# Patient Record
Sex: FEMALE | Race: WHITE | NOT HISPANIC OR LATINO | Employment: UNEMPLOYED | URBAN - METROPOLITAN AREA
[De-identification: names, ages, dates, MRNs, and addresses within clinical notes are randomized per-mention and may not be internally consistent; named-entity substitution may affect disease eponyms.]

---

## 2017-06-06 PROCEDURE — G0145 SCR C/V CYTO,THINLAYER,RESCR: HCPCS | Performed by: OBSTETRICS & GYNECOLOGY

## 2017-06-07 ENCOUNTER — LAB REQUISITION (OUTPATIENT)
Dept: LAB | Facility: HOSPITAL | Age: 42
End: 2017-06-07
Payer: COMMERCIAL

## 2017-06-07 DIAGNOSIS — Z01.419 ENCOUNTER FOR GYNECOLOGICAL EXAMINATION WITHOUT ABNORMAL FINDING: ICD-10-CM

## 2017-06-07 DIAGNOSIS — Z11.51 ENCOUNTER FOR SCREENING FOR HUMAN PAPILLOMAVIRUS (HPV): ICD-10-CM

## 2017-06-13 LAB
LAB AP GYN PRIMARY INTERPRETATION: NORMAL
LAB AP LMP: NORMAL
Lab: NORMAL

## 2017-11-17 ENCOUNTER — HOSPITAL ENCOUNTER (OUTPATIENT)
Dept: RADIOLOGY | Facility: HOSPITAL | Age: 42
Discharge: HOME | End: 2017-11-17
Payer: COMMERCIAL

## 2017-11-17 ENCOUNTER — TRANSCRIBE ORDERS (OUTPATIENT)
Dept: ADMINISTRATIVE | Facility: HOSPITAL | Age: 42
End: 2017-11-17

## 2017-11-17 DIAGNOSIS — J01.00 ACUTE MAXILLARY SINUSITIS, RECURRENCE NOT SPECIFIED: ICD-10-CM

## 2017-11-17 DIAGNOSIS — J45.40 MODERATE PERSISTENT ASTHMA WITHOUT COMPLICATION: ICD-10-CM

## 2017-11-17 DIAGNOSIS — J20.9 ACUTE BRONCHITIS, UNSPECIFIED ORGANISM: ICD-10-CM

## 2017-11-17 DIAGNOSIS — J20.9 ACUTE BRONCHITIS, UNSPECIFIED ORGANISM: Primary | ICD-10-CM

## 2017-11-17 PROCEDURE — 71020 HB CHEST X-RAY 2VW FRONTAL&LATL: CPT

## 2017-11-17 PROCEDURE — 70220 X-RAY EXAM OF SINUSES: CPT

## 2018-06-08 PROCEDURE — 87624 HPV HI-RISK TYP POOLED RSLT: CPT | Performed by: OBSTETRICS & GYNECOLOGY

## 2018-06-08 PROCEDURE — G0143 SCR C/V CYTO,THINLAYER,RESCR: HCPCS | Performed by: OBSTETRICS & GYNECOLOGY

## 2018-06-11 ENCOUNTER — LAB REQUISITION (OUTPATIENT)
Dept: LAB | Facility: HOSPITAL | Age: 43
End: 2018-06-11
Payer: COMMERCIAL

## 2018-06-11 DIAGNOSIS — Z11.51 ENCOUNTER FOR SCREENING FOR HUMAN PAPILLOMAVIRUS (HPV): ICD-10-CM

## 2018-06-11 DIAGNOSIS — Z01.419 ENCOUNTER FOR GYNECOLOGICAL EXAMINATION WITHOUT ABNORMAL FINDING: ICD-10-CM

## 2018-06-13 LAB
HPV RRNA GENITAL QL NAA+PROBE: NORMAL
LAB AP GYN PRIMARY INTERPRETATION: NORMAL
LAB AP LMP: NORMAL
Lab: NORMAL

## 2018-07-25 ENCOUNTER — OFFICE VISIT (OUTPATIENT)
Dept: URGENT CARE | Facility: MEDICAL CENTER | Age: 43
End: 2018-07-25
Payer: COMMERCIAL

## 2018-07-25 VITALS
OXYGEN SATURATION: 100 % | HEART RATE: 82 BPM | HEIGHT: 62 IN | TEMPERATURE: 98.2 F | SYSTOLIC BLOOD PRESSURE: 148 MMHG | BODY MASS INDEX: 35.37 KG/M2 | RESPIRATION RATE: 14 BRPM | WEIGHT: 192.2 LBS | DIASTOLIC BLOOD PRESSURE: 80 MMHG

## 2018-07-25 DIAGNOSIS — L23.9 ALLERGIC CONTACT DERMATITIS, UNSPECIFIED TRIGGER: Primary | ICD-10-CM

## 2018-07-25 PROCEDURE — G0382 LEV 3 HOSP TYPE B ED VISIT: HCPCS | Performed by: FAMILY MEDICINE

## 2018-07-25 PROCEDURE — S9083 URGENT CARE CENTER GLOBAL: HCPCS | Performed by: FAMILY MEDICINE

## 2018-07-25 RX ORDER — THYROID,PORK 90 MG
130 TABLET ORAL DAILY
Refills: 0 | COMMUNITY
Start: 2018-04-17 | End: 2020-07-22 | Stop reason: ALTCHOICE

## 2018-07-25 RX ORDER — LORATADINE 10 MG/1
10 TABLET ORAL DAILY
COMMUNITY

## 2018-07-25 RX ORDER — FLUTICASONE PROPIONATE AND SALMETEROL XINAFOATE 115; 21 UG/1; UG/1
2 AEROSOL, METERED RESPIRATORY (INHALATION) 2 TIMES DAILY
Refills: 2 | COMMUNITY
Start: 2018-04-18

## 2018-07-25 RX ORDER — ALBUTEROL SULFATE 2.5 MG/3ML
2.5 SOLUTION RESPIRATORY (INHALATION) EVERY 6 HOURS PRN
COMMUNITY

## 2018-07-25 RX ORDER — METHYLPREDNISOLONE SODIUM SUCCINATE 40 MG/ML
80 INJECTION, POWDER, LYOPHILIZED, FOR SOLUTION INTRAMUSCULAR; INTRAVENOUS ONCE
Status: COMPLETED | OUTPATIENT
Start: 2018-07-25 | End: 2018-07-25

## 2018-07-25 RX ORDER — NORGESTIMATE AND ETHINYL ESTRADIOL 7DAYSX3 28
1 KIT ORAL DAILY
Refills: 4 | COMMUNITY
Start: 2018-05-11 | End: 2019-06-20 | Stop reason: ALTCHOICE

## 2018-07-25 RX ORDER — PREDNISONE 10 MG/1
TABLET ORAL
Qty: 18 TABLET | Refills: 0 | Status: SHIPPED | OUTPATIENT
Start: 2018-07-26 | End: 2020-07-22 | Stop reason: ALTCHOICE

## 2018-07-25 RX ADMIN — METHYLPREDNISOLONE SODIUM SUCCINATE 80 MG: 40 INJECTION, POWDER, LYOPHILIZED, FOR SOLUTION INTRAMUSCULAR; INTRAVENOUS at 11:50

## 2018-07-25 NOTE — PATIENT INSTRUCTIONS
Solumedrol given in office  Start prednisone taper tomorrow- 30 mg x 3 days, 20 mg x 3 days, 10 mg x 3 days  Continue benadryl and topical creams as needed for itching  Wash all towels and pillow cases  Follow up with your PCP for persistent symptoms  Go to the ER for any distress  Contact Dermatitis   AMBULATORY CARE:   Contact dermatitis  is a rash  It develops when you touch something that irritates your skin or causes an allergic reaction  Common signs and symptoms include the following:   · Red, swollen, painful rash           · Skin that itches, stings, or burns    · Dry, scaly, or crusty skin patches    · Bumps or blisters    · Fluid draining from blisters  Call 911 for any of the following:   · You have sudden trouble breathing  · Your throat swells and you have trouble eating  · Your face is swollen  Contact your healthcare provider if:   · You have a fever  · Your blisters are draining pus  · Your rash spreads or does not get better, even after treatment  · You have questions or concerns about your condition or care  Treatment for contact dermatitis  involves removing any irritants or allergens that cause your rash  You may also need medicines to decrease itching and swelling  They will be given as a topical medicine to apply to your rash or as a pill  Manage contact dermatitis:   · Take short baths or showers in cool water  Use mild soap or soap-free cleansers  Add oatmeal, baking soda, or cornstarch to the bath water to help decrease skin irritation  · Avoid skin irritants , such as makeup, hair products, soaps, and cleansers  Use products that do not contain perfume or dye  · Apply a cool compress to your rash  This will help soothe your skin  · Keep your skin moist   Rub unscented cream or lotion on your skin to prevent dryness and itching  Do this right after a bath or shower when your skin is still damp    Follow up with your healthcare provider as directed: Write down your questions so you remember to ask them during your visits  © 2017 [address] [employer] Information is for End User's use only and may not be sold, redistributed or otherwise used for commercial purposes  All illustrations and images included in CareNotes® are the copyrighted property of A [employer] M [employer]  or [employer]  The above information is an  only  It is not intended as medical advice for individual conditions or treatments  Talk to your doctor, nurse or pharmacist before following any medical regimen to see if it is safe and effective for you

## 2018-07-25 NOTE — PROGRESS NOTES
[facility]        NAME: Madonna Morrell is a 38 y o  female  : 1975    MRN: [id number]  DATE: 2018  TIME: 12:06 PM    Assessment and Plan   Allergic contact dermatitis, unspecified trigger [L23 9]  1  Allergic contact dermatitis, unspecified trigger  methylPREDNISolone sodium succinate (Solu-MEDROL) injection 80 mg    predniSONE 10 mg tablet         Patient Instructions     Solumedrol given in office  Start prednisone taper tomorrow- 30 mg x 3 days, 20 mg x 3 days, 10 mg x 3 days  Continue benadryl and topical creams as needed for itching  Wash all towels and pillow cases  Follow up with PCP in 3-5 days  Proceed to  ER if symptoms worsen  Chief Complaint     Chief Complaint   Patient presents with    Rash     face and neck x 2 day- the rash is hot and itchy         History of Present Illness       This is a 38year old female presenting for rash on her face x 4 days  She states that the rash started on the right side of her neck and has spread across her face  The rash is erythematous, raised in some areas, and very pruritic  No pain, fevers, shortness of breath, throat swelling, difficulty swallowing  She has a lot of environmental allergies  No new lotions, soaps, detergents, or contact with poison plants that she can think of  Review of Systems   Review of Systems   Constitutional: Negative for chills, fatigue and fever  HENT: Negative for sore throat and trouble swallowing  Respiratory: Negative for shortness of breath and wheezing  Gastrointestinal: Negative for diarrhea, nausea and vomiting  Skin: Positive for rash  Neurological: Negative for headaches           Current Medications       Current Outpatient Prescriptions:     albuterol (2 5 mg/3 mL) 0 083 % nebulizer solution, Take 2 5 mg by nebulization every 6 (six) hours as needed for wheezing or shortness of breath, Disp: , Rfl:     loratadine (CLARITIN) 10 mg tablet, Take 10 mg by mouth daily, Disp: , Rfl:     ADVAIR -21 MCG/ACT inhaler, Inhale 2 puffs 2 (two) times a day Rinse mouth after use, Disp: , Rfl: 2    ARMOUR THYROID 90 MG tablet, Take 130 mg by mouth daily, Disp: , Rfl: 0    [START ON 2018] predniSONE 10 mg tablet, 30 mg x 3 days, 20 mg x 3 days, 10 mg x 3 days, Disp: 18 tablet, Rfl: 0    TRI FEMYNOR 0 18/0 215/0 25 MG-35 MCG per tablet, Take 1 tablet by mouth daily, Disp: , Rfl: 4  No current facility-administered medications for this visit  Current Allergies     Allergies as of 2018 - Reviewed 2018   Allergen Reaction Noted    Asa [aspirin]  2018    Avelox [moxifloxacin]  2018            The following portions of the patient's history were reviewed and updated as appropriate: allergies, current medications, past family history, past medical history, past social history, past surgical history and problem list      Past Medical History:   Diagnosis Date    Disease of thyroid gland     Endometriosis        Past Surgical History:   Procedure Laterality Date     SECTION      WISDOM TOOTH EXTRACTION         Family History   Problem Relation Age of Onset    Heart disease Mother     Diabetes Mother     Hypertension Mother     Heart disease Father          Medications have been verified  Objective   /80   Pulse 82   Temp 98 2 °F (36 8 °C) (Temporal)   Resp 14   Ht 5' 2" (1 575 m)   Wt 87 2 kg (192 lb 3 2 oz)   LMP 2018 (Approximate)   SpO2 100%   BMI 35 15 kg/m²        Physical Exam     Physical Exam   Constitutional: She appears well-developed and well-nourished  No distress  HENT:   Head: Normocephalic and atraumatic  Nose: Nose normal    Eyes: Conjunctivae and EOM are normal  Pupils are equal, round, and reactive to light  Cardiovascular: Normal rate, regular rhythm and normal heart sounds  Pulmonary/Chest: Effort normal and breath sounds normal  No respiratory distress  She has no wheezes  She has no rales  Neurological: She is alert  Skin: Skin is warm and dry  Rash noted  She is not diaphoretic  There is an erythematous, papular rash on the face  There are some areas of small vesicles and some areas of just macular redness  No involvement of the eyes or oral mucosa  + excoriation  Nursing note and vitals reviewed

## 2018-09-29 ENCOUNTER — TRANSCRIBE ORDERS (OUTPATIENT)
Dept: LAB | Facility: CLINIC | Age: 43
End: 2018-09-29

## 2018-09-29 ENCOUNTER — APPOINTMENT (OUTPATIENT)
Dept: LAB | Facility: CLINIC | Age: 43
End: 2018-09-29
Payer: COMMERCIAL

## 2018-09-29 DIAGNOSIS — E03.4 IDIOPATHIC ATROPHIC HYPOTHYROIDISM: Primary | ICD-10-CM

## 2018-09-29 DIAGNOSIS — E03.4 IDIOPATHIC ATROPHIC HYPOTHYROIDISM: ICD-10-CM

## 2018-09-29 LAB
T4 FREE SERPL-MCNC: 0.73 NG/DL (ref 0.76–1.46)
TSH SERPL DL<=0.05 MIU/L-ACNC: 7.23 UIU/ML (ref 0.36–3.74)

## 2018-09-29 PROCEDURE — 84443 ASSAY THYROID STIM HORMONE: CPT

## 2018-09-29 PROCEDURE — 36415 COLL VENOUS BLD VENIPUNCTURE: CPT

## 2018-09-29 PROCEDURE — 84439 ASSAY OF FREE THYROXINE: CPT

## 2019-06-20 ENCOUNTER — ANNUAL EXAM (OUTPATIENT)
Dept: OBGYN CLINIC | Facility: CLINIC | Age: 44
End: 2019-06-20
Payer: COMMERCIAL

## 2019-06-20 VITALS
SYSTOLIC BLOOD PRESSURE: 120 MMHG | WEIGHT: 192 LBS | DIASTOLIC BLOOD PRESSURE: 70 MMHG | HEIGHT: 62 IN | BODY MASS INDEX: 35.33 KG/M2

## 2019-06-20 DIAGNOSIS — Z01.419 ENCOUNTER FOR WELL WOMAN EXAM: Primary | ICD-10-CM

## 2019-06-20 DIAGNOSIS — Z30.41 ENCOUNTER FOR SURVEILLANCE OF CONTRACEPTIVE PILLS: ICD-10-CM

## 2019-06-20 DIAGNOSIS — Z12.39 SCREENING FOR BREAST CANCER: ICD-10-CM

## 2019-06-20 DIAGNOSIS — Z30.41 SURVEILLANCE FOR BIRTH CONTROL, ORAL CONTRACEPTIVES: ICD-10-CM

## 2019-06-20 PROCEDURE — S0612 ANNUAL GYNECOLOGICAL EXAMINA: HCPCS | Performed by: PHYSICIAN ASSISTANT

## 2019-06-20 RX ORDER — ALBUTEROL SULFATE 90 UG/1
AEROSOL, METERED RESPIRATORY (INHALATION)
COMMUNITY
End: 2021-07-16 | Stop reason: HOSPADM

## 2019-06-20 RX ORDER — HYDROXYCHLOROQUINE SULFATE 200 MG/1
100 TABLET, FILM COATED ORAL 2 TIMES DAILY WITH MEALS
Status: ON HOLD | COMMUNITY
End: 2021-07-16

## 2019-06-20 RX ORDER — NORGESTIMATE AND ETHINYL ESTRADIOL 7DAYSX3 28
1 KIT ORAL DAILY
Qty: 90 TABLET | Refills: 3 | Status: SHIPPED | OUTPATIENT
Start: 2019-06-20 | End: 2020-05-12 | Stop reason: SDUPTHER

## 2019-06-20 RX ORDER — LEVOTHYROXINE SODIUM 112 UG/1
112 TABLET ORAL DAILY
COMMUNITY
End: 2020-07-22 | Stop reason: ALTCHOICE

## 2019-06-20 RX ORDER — NORGESTIMATE AND ETHINYL ESTRADIOL 7DAYSX3 28
1 KIT ORAL DAILY
Qty: 30 TABLET | Refills: 11 | Status: CANCELLED | OUTPATIENT
Start: 2019-06-20

## 2020-05-12 DIAGNOSIS — Z30.41 SURVEILLANCE FOR BIRTH CONTROL, ORAL CONTRACEPTIVES: ICD-10-CM

## 2020-05-13 RX ORDER — NORGESTIMATE AND ETHINYL ESTRADIOL 7DAYSX3 28
1 KIT ORAL DAILY
Qty: 90 TABLET | Refills: 0 | Status: SHIPPED | OUTPATIENT
Start: 2020-05-13 | End: 2020-07-22 | Stop reason: SDUPTHER

## 2020-06-22 ENCOUNTER — HOSPITAL ENCOUNTER (OUTPATIENT)
Dept: MAMMOGRAPHY | Facility: HOSPITAL | Age: 45
Discharge: HOME | End: 2020-06-22
Payer: COMMERCIAL

## 2020-06-22 VITALS — HEIGHT: 62 IN | WEIGHT: 192 LBS | BODY MASS INDEX: 35.33 KG/M2

## 2020-06-22 DIAGNOSIS — Z12.39 SCREENING FOR BREAST CANCER: ICD-10-CM

## 2020-06-22 PROCEDURE — 77063 BREAST TOMOSYNTHESIS BI: CPT

## 2020-06-22 PROCEDURE — 77067 SCR MAMMO BI INCL CAD: CPT

## 2020-06-24 ENCOUNTER — ANNUAL EXAM (OUTPATIENT)
Dept: OBGYN CLINIC | Facility: CLINIC | Age: 45
End: 2020-06-24
Payer: COMMERCIAL

## 2020-06-24 VITALS
SYSTOLIC BLOOD PRESSURE: 124 MMHG | BODY MASS INDEX: 35.33 KG/M2 | HEIGHT: 62 IN | DIASTOLIC BLOOD PRESSURE: 80 MMHG | WEIGHT: 192 LBS

## 2020-06-24 DIAGNOSIS — Z01.419 ENCOUNTER FOR WELL WOMAN EXAM: Primary | ICD-10-CM

## 2020-06-24 DIAGNOSIS — Z30.41 SURVEILLANCE FOR BIRTH CONTROL, ORAL CONTRACEPTIVES: ICD-10-CM

## 2020-06-24 DIAGNOSIS — Z12.39 ENCOUNTER FOR SCREENING BREAST EXAMINATION: ICD-10-CM

## 2020-06-24 DIAGNOSIS — Z12.31 ENCOUNTER FOR SCREENING MAMMOGRAM FOR BREAST CANCER: ICD-10-CM

## 2020-06-24 PROCEDURE — S0612 ANNUAL GYNECOLOGICAL EXAMINA: HCPCS | Performed by: PHYSICIAN ASSISTANT

## 2020-06-24 RX ORDER — NORGESTIMATE AND ETHINYL ESTRADIOL 7DAYSX3 28
1 KIT ORAL DAILY
Qty: 90 TABLET | Refills: 4 | Status: SHIPPED | OUTPATIENT
Start: 2020-06-24 | End: 2021-07-16 | Stop reason: HOSPADM

## 2020-07-14 ENCOUNTER — TELEPHONE (OUTPATIENT)
Dept: ENDOCRINOLOGY | Facility: CLINIC | Age: 45
End: 2020-07-14

## 2020-07-22 ENCOUNTER — OFFICE VISIT (OUTPATIENT)
Dept: ENDOCRINOLOGY | Facility: CLINIC | Age: 45
End: 2020-07-22
Payer: COMMERCIAL

## 2020-07-22 VITALS
WEIGHT: 191 LBS | SYSTOLIC BLOOD PRESSURE: 124 MMHG | BODY MASS INDEX: 35.5 KG/M2 | DIASTOLIC BLOOD PRESSURE: 78 MMHG | HEART RATE: 62 BPM | TEMPERATURE: 97.5 F

## 2020-07-22 DIAGNOSIS — J45.20 MILD INTERMITTENT ASTHMA WITHOUT COMPLICATION: ICD-10-CM

## 2020-07-22 DIAGNOSIS — E06.3 HYPOTHYROIDISM DUE TO HASHIMOTO'S THYROIDITIS: Primary | ICD-10-CM

## 2020-07-22 DIAGNOSIS — E03.8 HYPOTHYROIDISM DUE TO HASHIMOTO'S THYROIDITIS: Primary | ICD-10-CM

## 2020-07-22 DIAGNOSIS — E66.9 CLASS 2 OBESITY WITHOUT SERIOUS COMORBIDITY WITH BODY MASS INDEX (BMI) OF 35.0 TO 35.9 IN ADULT, UNSPECIFIED OBESITY TYPE: ICD-10-CM

## 2020-07-22 DIAGNOSIS — E55.9 VITAMIN D DEFICIENCY: ICD-10-CM

## 2020-07-22 DIAGNOSIS — E66.9 OBESITY: ICD-10-CM

## 2020-07-22 PROCEDURE — 99244 OFF/OP CNSLTJ NEW/EST MOD 40: CPT | Performed by: INTERNAL MEDICINE

## 2020-07-22 RX ORDER — LEVOTHYROXINE SODIUM 112 MCG
TABLET ORAL
Qty: 100 TABLET | Refills: 0 | Status: SHIPPED | OUTPATIENT
Start: 2020-07-22 | End: 2020-10-19 | Stop reason: SDUPTHER

## 2020-07-22 NOTE — PROGRESS NOTES
New Patient Progress Note      CC: Hypothyroidism    History of Present Illness:   53 yr female with hypothyroidism, mild childhood Asthma, Sjogrens syndrome, obesity and endometriosis  She was diagnosed with Hashimoto's hypothyroidism in  with positive TPO antibodies after her second child and was initially tried on levothyroxine and [place] synthroid without adequate control but over last 1 year, has been stable on brand Synthroid 112mcg daily  She takes every morning on empty stomach and waits 1 hr before other medications  Presently she denies any cold intolerance, fatigue or constipation  No new hair loss  She follows with rheumatology for Sjogrens and is on plaquenil  She is on a OCP with withdrawal bleeding  No History of external radiation to head/neck/chest   No recent Iodine loading in form of medication, erbs or kelp supplements or radiological diagnostic studies  No difficulty with swallowing or breathing or change in voice  Family Hx of thyroid cancers:No  Reported Hx of type 2 diabetes and heart disease with mother and Father  There is no problem list on file for this patient  Past Medical History:   Diagnosis Date    Disease of thyroid gland     Endometriosis     Sjogren's syndrome ([facility] [address] )       Past Surgical History:   Procedure Laterality Date     SECTION      WISDOM TOOTH EXTRACTION        Family History   Problem Relation Age of Onset    Heart disease Mother     Diabetes Mother     Hypertension Mother     Heart disease Father      Social History     Tobacco Use    Smoking status: Never Smoker    Smokeless tobacco: Never Used   Substance Use Topics    Alcohol use: Not Currently     Comment: social     Allergies   Allergen Reactions    Asa [Aspirin]     Avelox [Moxifloxacin]        Review of Systems   Constitutional: Positive for fatigue  HENT: Negative  Eyes: Negative  Respiratory: Negative  Cardiovascular: Negative      Gastrointestinal: Negative  Endocrine: Negative  Musculoskeletal: Negative  Skin: Negative  Allergic/Immunologic: Negative  Neurological: Negative  Hematological: Negative  Psychiatric/Behavioral: Negative  All other systems reviewed and are negative  Physical Exam:  Body mass index is 35 5 kg/m²  /78   Pulse 62   Temp 97 5 °F (36 4 °C)   Wt 86 6 kg (191 lb)   BMI 35 50 kg/m²      Physical Exam   Constitutional: She is oriented to person, place, and time  She appears well-developed  HENT:   Head: Normocephalic  Mouth/Throat: Oropharynx is clear and moist    Eyes: Pupils are equal, round, and reactive to light  Neck: Normal range of motion  No thyromegaly present  Cardiovascular: Normal rate and normal heart sounds  Pulmonary/Chest: Effort normal and breath sounds normal    Abdominal: Soft  Bowel sounds are normal    Musculoskeletal: She exhibits no edema or deformity  Neurological: She is alert and oriented to person, place, and time  Skin: Capillary refill takes less than 2 seconds  No rash noted  No pallor  Psychiatric: She has a normal mood and affect  Vitals reviewed  Labs: Impression:  1  Hypothyroidism due to Hashimoto's thyroiditis    2  Obesity    3  Mild intermittent asthma without complication    4  Vitamin D deficiency    5  Class 2 obesity without serious comorbidity with body mass index (BMI) of 35 0 to 35 9 in adult, unspecified obesity type         Plan:    Diagnoses and all orders for this visit:    Hypothyroidism due to Hashimoto's thyroiditis  She seems clinically euthyroid but biochemically slightly under treated with recent TSH 2 85uIU/mL with fT4 1 18ng/dL  Today we discussed her thyroid physiology, her diagnosis and plan of care  Advised to increase to Synthroid 112mcg 1 tab daily Monday through Saturday and 1 5 tab on Sunday  Follow up in 3 months  -     T4, free; Future  -     TSH, 3rd generation;  Future  -     Comprehensive metabolic panel; Future    Sjogren's Syndrome  She is on plaquenil and follows with a rheumatologist  Recent CRP>11  Vitamin D deficiency  -     Vitamin D 25 hydroxy; Future    Class 2 obesity without serious comorbidity with body mass index (BMI) of 35 0 to 35 9 in adult, unspecified obesity type  -     Hemoglobin A1C; Future  -     Lipid panel; Future      Discussed with the patient and all questioned fully answered  She will call me if any problems arise  Educated/ Counseled patient on diagnostic test results, prognosis, risk vs benefit of treatment options, importance of treatment compliance, healthy life and lifestyle choices        [address]

## 2020-07-22 NOTE — LETTER
2020     [address]  [address] [facility] [address]    Patient: Dragan Harris   YOB: 1975   Date of Visit: 2020       Dear Dr Shavon Ma:    Thank you for referring Dragan Harris to me for evaluation  Below are my notes for this consultation  If you have questions, please do not hesitate to call me  I look forward to following your patient along with you  Sincerely,        Dash Uribe MD        CC: No Recipients  [address] MD  2020 10:04 AM  Attested    New Patient Progress Note      CC: Hypothyroidism    History of Present Illness:   53 yr female with hypothyroidism, mild childhood Asthma, Sjogrens syndrome, obesity and endometriosis  She was diagnosed with Hashimoto's hypothyroidism in  with positive TPO antibodies after her second child and was initially tried on levothyroxine and [place] synthroid without adequate control but over last 1 year, has been stable on brand Synthroid 112mcg daily  She takes every morning on empty stomach and waits 1 hr before other medications  Presently she denies any cold intolerance, fatigue or constipation  No new hair loss  She follows with rheumatology for Sjogrens and is on plaquenil  She is on a OCP with withdrawal bleeding  No History of external radiation to head/neck/chest   No recent Iodine loading in form of medication, erbs or kelp supplements or radiological diagnostic studies  No difficulty with swallowing or breathing or change in voice  Family Hx of thyroid cancers:No  Reported Hx of type 2 diabetes and heart disease with mother and Father  There is no problem list on file for this patient      Past Medical History:   Diagnosis Date    Disease of thyroid gland     Endometriosis     Sjogren's syndrome ([facility] [address] )       Past Surgical History:   Procedure Laterality Date     SECTION      WISDOM TOOTH EXTRACTION        Family History   Problem Relation Age of Onset    Heart disease Mother     Diabetes Mother     Hypertension Mother     Heart disease Father      Social History     Tobacco Use    Smoking status: Never Smoker    Smokeless tobacco: Never Used   Substance Use Topics    Alcohol use: Not Currently     Comment: social     Allergies   Allergen Reactions    Asa [Aspirin]     Avelox [Moxifloxacin]        Review of Systems   Constitutional: Positive for fatigue  HENT: Negative  Eyes: Negative  Respiratory: Negative  Cardiovascular: Negative  Gastrointestinal: Negative  Endocrine: Negative  Musculoskeletal: Negative  Skin: Negative  Allergic/Immunologic: Negative  Neurological: Negative  Hematological: Negative  Psychiatric/Behavioral: Negative  All other systems reviewed and are negative  Physical Exam:  Body mass index is 35 5 kg/m²  /78   Pulse 62   Temp 97 5 °F (36 4 °C)   Wt 86 6 kg (191 lb)   BMI 35 50 kg/m²       Physical Exam   Constitutional: She is oriented to person, place, and time  She appears well-developed  HENT:   Head: Normocephalic  Mouth/Throat: Oropharynx is clear and moist    Eyes: Pupils are equal, round, and reactive to light  Neck: Normal range of motion  No thyromegaly present  Cardiovascular: Normal rate and normal heart sounds  Pulmonary/Chest: Effort normal and breath sounds normal    Abdominal: Soft  Bowel sounds are normal    Musculoskeletal: She exhibits no edema or deformity  Neurological: She is alert and oriented to person, place, and time  Skin: Capillary refill takes less than 2 seconds  No rash noted  No pallor  Psychiatric: She has a normal mood and affect  Vitals reviewed  Labs: Impression:  1  Hypothyroidism due to Hashimoto's thyroiditis    2  Obesity    3  Mild intermittent asthma without complication    4  Vitamin D deficiency    5   Class 2 obesity without serious comorbidity with body mass index (BMI) of 35 0 to 35 9 in adult, unspecified obesity type         Plan:    Diagnoses and all orders for this visit:    Hypothyroidism due to Hashimoto's thyroiditis  She seems clinically euthyroid but biochemically slightly under treated with recent TSH 2 85uIU/mL with fT4 1 18ng/dL  Today we discussed her thyroid physiology, her diagnosis and plan of care  Advised to increase to Synthroid 112mcg 1 tab daily Monday through Saturday and 1 5 tab on Sunday  Follow up in 3 months  -     T4, free; Future  -     TSH, 3rd generation; Future  -     Comprehensive metabolic panel; Future    Sjogren's Syndrome  She is on plaquenil and follows with a rheumatologist  Recent CRP>11  Vitamin D deficiency  -     Vitamin D 25 hydroxy; Future    Class 2 obesity without serious comorbidity with body mass index (BMI) of 35 0 to 35 9 in adult, unspecified obesity type  -     Hemoglobin A1C; Future  -     Lipid panel; Future      Discussed with the patient and all questioned fully answered  She will call me if any problems arise  Educated/ Counseled patient on diagnostic test results, prognosis, risk vs benefit of treatment options, importance of treatment compliance, healthy life and lifestyle choices  [address] [place] [address] [address] signed by Regan Cavazos MD at 7/23/2020 12:23 PM:  I have personally seen and examined the patient on   I have reviewed the note and assessment performed by the fellow  and agree with the documented findings and plan of care with the following additions/exceptions  Please see my following comments for details and adjustments    This 8year-old woman with medical history of hypothyroidism, asthma, Sjogren syndrome, obesity is here for establishing care for hypothyroidism  Currently she is taking brand Synthroid 112 mcg daily  She was taking levothyroxine and [place] Thyroid before which did not seem to help    She denies any symptoms of hypothyroidism or hyperthyroidism    Reviewed past medical history, social history, surgical history    General: No acute distress  Alert, awake, and oriented x3  Obese   HEENT: Normocephalic, atraumatic  Heart: Regular rate and rhythm  Lungs: Clear  No respiratory distress  Extremities: No edema  Skin: Warm, dry  No rash  Neuro: Moves all 4 extremities spontaneously  Psych: Appropriate mood and affect  Cooperative  Diagnoses and all orders for this visit:    Hypothyroidism due to Hashimoto's thyroiditis  Lab Results   Component Value Date    [id number] 7 226 (H) 09/29/2018   Most recent TSH is 2 8  Goal is to keep TSH in 0 5-2 0, to achieve maximum benefit of levothyroxine  Will increase the dose to 112 mcg of Synthroid for Monday through Saturday and 1 5 tablet on Sunday  Repeat blood work in 6 weeks  Follow-up in 3 months  Educated to take Synthroid on empty stomach 1 hour before breakfast and 4 hour apart from calcium and vitamin-D supplementation    -     T4, free; Future  -     TSH, 3rd generation; Future  -     Comprehensive metabolic panel; Future  -     T4, free; Future  -     TSH, 3rd generation; Future  -     SYNTHROID 112 MCG tablet; Take 1 tab daily Monday through Saturday and 1 5 tab sunday    Obesity  Discussed the importance of lifestyle modifications, dietary modifications  -     Lipid panel; Future    Mild intermittent asthma without complication  Currently stable on medications, followed by PCP  Vitamin D deficiency  Discussed with patient to take vitamin-D 3 supplementation 1000 International Units daily  -     Vitamin D 25 hydroxy;  Future    Class 2 obesity without serious comorbidity with body mass index (BMI) of 35 0 to 35 9 in adult, unspecified obesity type  -     Hemoglobin A1C; Future     Dominic Latham MD

## 2020-08-18 ENCOUNTER — TELEPHONE (OUTPATIENT)
Dept: ENDOCRINOLOGY | Facility: CLINIC | Age: 45
End: 2020-08-18

## 2020-08-18 DIAGNOSIS — R00.2 PALPITATIONS: Primary | ICD-10-CM

## 2020-08-18 NOTE — TELEPHONE ENCOUNTER
Her palpatations could be most likely from Prednisone as it started after starting the prednisone treatment,     Please inform pt to do thyroid blood work tomorrow, and hold dose of levothyroxine tomorrow, based on the results, will let her know if we need to reduce the dose of levothyroxine  Please order the blood work TSH and free Lord Shad MD

## 2020-08-18 NOTE — TELEPHONE ENCOUNTER
Pt informed to go for blood work tomorrow and hold levothyroxine tomorrow  Will call patient back with results and any new recommendations  Blood work scripts ordered

## 2020-08-18 NOTE — TELEPHONE ENCOUNTER
Pt started taking oral Prednisone  It is a 15 day treatment in total  She states that when she takes both the Prednisone and synthroid 112 mcg that is getting heart palpitations  She states it keeps her up at night with how strong they feel  And now she feels like it is going up into her left arm with the palpitations  Please advise

## 2020-08-19 NOTE — TELEPHONE ENCOUNTER
Pt called upset she wasted an hour at the lab and had lab slips dated for September  Advised pt there are lab orders from yesterday in chart dated for today, she then told me she went to \[facility]\"" in [place] and they did not have them and her mother had labs drawn, not her and she was late to her mothers dentist appointment! Advised her we do not know where a pt goes to the lab unless they make us aware and the person she spoke to yesterday probably thought she was going to [facility]  She stated "if I live up here why would I go to [facility]?" Requested location of facility she stated [address] [place] [address], apologized to pt and  advised her I would fax slips ASAP  called facility fax number [phone] faxed slips

## 2020-08-20 ENCOUNTER — TELEPHONE (OUTPATIENT)
Dept: ENDOCRINOLOGY | Facility: CLINIC | Age: 45
End: 2020-08-20

## 2020-08-20 NOTE — TELEPHONE ENCOUNTER
----- Message from Ez Donovan MD sent at 8/20/2020  1:18 PM EDT -----  Please inform patient that thyroid blood work is normal, continue current dose of levothyroxine

## 2020-10-19 DIAGNOSIS — E03.8 HYPOTHYROIDISM DUE TO HASHIMOTO'S THYROIDITIS: ICD-10-CM

## 2020-10-19 DIAGNOSIS — E06.3 HYPOTHYROIDISM DUE TO HASHIMOTO'S THYROIDITIS: ICD-10-CM

## 2020-10-19 RX ORDER — LEVOTHYROXINE SODIUM 112 MCG
TABLET ORAL
Qty: 100 TABLET | Refills: 1 | Status: SHIPPED | OUTPATIENT
Start: 2020-10-19 | End: 2021-04-23 | Stop reason: SDUPTHER

## 2020-10-20 ENCOUNTER — TELEPHONE (OUTPATIENT)
Dept: ENDOCRINOLOGY | Facility: CLINIC | Age: 45
End: 2020-10-20

## 2020-10-22 LAB — HBA1C MFR BLD HPLC: 5.5 %

## 2020-10-28 ENCOUNTER — TELEPHONE (OUTPATIENT)
Dept: ENDOCRINOLOGY | Facility: CLINIC | Age: 45
End: 2020-10-28

## 2020-10-29 ENCOUNTER — TELEPHONE (OUTPATIENT)
Dept: ENDOCRINOLOGY | Facility: CLINIC | Age: 45
End: 2020-10-29

## 2020-12-24 RX ORDER — IPRATROPIUM BROMIDE AND ALBUTEROL SULFATE 2.5; .5 MG/3ML; MG/3ML
SOLUTION RESPIRATORY (INHALATION)
Status: ON HOLD | COMMUNITY
End: 2021-07-16

## 2020-12-24 RX ORDER — ALBUTEROL SULFATE 2.5 MG/3ML
SOLUTION RESPIRATORY (INHALATION)
COMMUNITY
End: 2021-07-16 | Stop reason: HOSPADM

## 2020-12-24 RX ORDER — HYDROCHLOROTHIAZIDE 25 MG/1
25 TABLET ORAL DAILY
Status: ON HOLD | COMMUNITY
Start: 2020-09-17 | End: 2021-07-16

## 2020-12-24 RX ORDER — HYDROXYCHLOROQUINE SULFATE 200 MG/1
200 TABLET, FILM COATED ORAL 2 TIMES DAILY
Status: ON HOLD | COMMUNITY
Start: 2020-09-12 | End: 2021-07-16

## 2020-12-24 RX ORDER — MOMETASONE FUROATE AND FORMOTEROL FUMARATE DIHYDRATE 200; 5 UG/1; UG/1
AEROSOL RESPIRATORY (INHALATION)
Status: ON HOLD | COMMUNITY
End: 2021-07-16

## 2020-12-24 RX ORDER — LEVOTHYROXINE SODIUM 112 UG/1
TABLET ORAL
COMMUNITY
Start: 2020-10-19 | End: 2021-04-26

## 2020-12-24 RX ORDER — ALBUTEROL SULFATE 90 UG/1
AEROSOL, METERED RESPIRATORY (INHALATION)
COMMUNITY

## 2020-12-24 RX ORDER — HYDROCHLOROTHIAZIDE 25 MG/1
TABLET ORAL
Status: ON HOLD | COMMUNITY
End: 2021-07-16

## 2020-12-24 RX ORDER — FLUTICASONE PROPIONATE AND SALMETEROL XINAFOATE 115; 21 UG/1; UG/1
AEROSOL, METERED RESPIRATORY (INHALATION)
COMMUNITY
Start: 2020-10-29 | End: 2021-07-16 | Stop reason: SDUPTHER

## 2020-12-24 RX ORDER — NORGESTIMATE AND ETHINYL ESTRADIOL 7DAYSX3 28
KIT ORAL
COMMUNITY
End: 2021-07-16 | Stop reason: HOSPADM

## 2020-12-29 ENCOUNTER — OFFICE VISIT (OUTPATIENT)
Dept: ENDOCRINOLOGY | Facility: CLINIC | Age: 45
End: 2020-12-29
Payer: COMMERCIAL

## 2020-12-29 VITALS
SYSTOLIC BLOOD PRESSURE: 140 MMHG | HEART RATE: 80 BPM | WEIGHT: 203 LBS | DIASTOLIC BLOOD PRESSURE: 80 MMHG | HEIGHT: 61 IN | TEMPERATURE: 97.2 F | BODY MASS INDEX: 38.33 KG/M2

## 2020-12-29 DIAGNOSIS — E06.3 HYPOTHYROIDISM DUE TO HASHIMOTO'S THYROIDITIS: Primary | ICD-10-CM

## 2020-12-29 DIAGNOSIS — E03.8 HYPOTHYROIDISM DUE TO HASHIMOTO'S THYROIDITIS: Primary | ICD-10-CM

## 2020-12-29 DIAGNOSIS — E55.9 VITAMIN D DEFICIENCY: ICD-10-CM

## 2020-12-29 PROCEDURE — 99213 OFFICE O/P EST LOW 20 MIN: CPT | Performed by: PHYSICIAN ASSISTANT

## 2021-04-23 DIAGNOSIS — E06.3 HYPOTHYROIDISM DUE TO HASHIMOTO'S THYROIDITIS: ICD-10-CM

## 2021-04-23 DIAGNOSIS — E03.8 HYPOTHYROIDISM DUE TO HASHIMOTO'S THYROIDITIS: ICD-10-CM

## 2021-04-23 RX ORDER — LEVOTHYROXINE SODIUM 112 MCG
TABLET ORAL
Qty: 100 TABLET | Refills: 1 | Status: SHIPPED | OUTPATIENT
Start: 2021-04-23 | End: 2021-04-26

## 2021-04-26 DIAGNOSIS — E03.8 HYPOTHYROIDISM DUE TO HASHIMOTO'S THYROIDITIS: ICD-10-CM

## 2021-04-26 DIAGNOSIS — E06.3 HYPOTHYROIDISM DUE TO HASHIMOTO'S THYROIDITIS: ICD-10-CM

## 2021-04-26 RX ORDER — LEVOTHYROXINE SODIUM 112 MCG
TABLET ORAL
Qty: 100 TABLET | Refills: 1 | Status: SHIPPED | OUTPATIENT
Start: 2021-04-26 | End: 2021-12-06 | Stop reason: SDUPTHER

## 2021-06-30 ENCOUNTER — ANNUAL EXAM (OUTPATIENT)
Dept: OBGYN CLINIC | Facility: CLINIC | Age: 46
End: 2021-06-30
Payer: COMMERCIAL

## 2021-06-30 VITALS
SYSTOLIC BLOOD PRESSURE: 120 MMHG | WEIGHT: 194 LBS | DIASTOLIC BLOOD PRESSURE: 80 MMHG | HEIGHT: 61 IN | BODY MASS INDEX: 36.63 KG/M2

## 2021-06-30 DIAGNOSIS — N76.0 ACUTE VAGINITIS: ICD-10-CM

## 2021-06-30 DIAGNOSIS — B37.49 GENITAL CANDIDIASIS: ICD-10-CM

## 2021-06-30 DIAGNOSIS — Z11.51 SCREENING FOR HPV (HUMAN PAPILLOMAVIRUS): ICD-10-CM

## 2021-06-30 DIAGNOSIS — N89.8 VAGINAL DISCHARGE: ICD-10-CM

## 2021-06-30 DIAGNOSIS — Z11.3 SCREENING FOR STDS (SEXUALLY TRANSMITTED DISEASES): ICD-10-CM

## 2021-06-30 DIAGNOSIS — Z01.419 ROUTINE GYNECOLOGICAL EXAMINATION: ICD-10-CM

## 2021-06-30 DIAGNOSIS — Z12.39 ENCOUNTER FOR SCREENING BREAST EXAMINATION: ICD-10-CM

## 2021-06-30 DIAGNOSIS — R10.32 LLQ PAIN: ICD-10-CM

## 2021-06-30 DIAGNOSIS — Z12.31 ENCOUNTER FOR SCREENING MAMMOGRAM FOR BREAST CANCER: ICD-10-CM

## 2021-06-30 DIAGNOSIS — Z01.419 ENCOUNTER FOR WELL WOMAN EXAM: Primary | ICD-10-CM

## 2021-06-30 DIAGNOSIS — R10.2 PELVIC PAIN: ICD-10-CM

## 2021-06-30 PROCEDURE — S0612 ANNUAL GYNECOLOGICAL EXAMINA: HCPCS | Performed by: PHYSICIAN ASSISTANT

## 2021-06-30 NOTE — PROGRESS NOTES
Assessment/Plan:    No problem-specific Assessment & Plan notes found for this encounter  Diagnoses and all orders for this visit:    Encounter for well woman exam    Routine gynecological examination  -     Liquid-based pap, screening  -     GP Pap Dependent HPV Cotest >= 27years old    Encounter for screening breast examination    Encounter for screening mammogram for breast cancer  -     Mammo screening bilateral w 3d & cad; Future    Screening for HPV (human papillomavirus)  -     Liquid-based pap, screening  -     GP Pap Dependent HPV Cotest >= 27years old    Pelvic pain  -     US pelvis complete non OB; Future    LLQ pain  -     US pelvis complete non OB; Future    Acute vaginitis  -     GP Vaginitis/Vaginosis W/O PAP W/O HPV  Expanded    Vaginal discharge  -     GP Vaginitis/Vaginosis W/O PAP W/O HPV  Expanded    Genital candidiasis  -     GP Vaginitis/Vaginosis W/O PAP W/O HPV  Expanded    Screening for STDs (sexually transmitted diseases)  -     GP Vaginitis/Vaginosis W/O PAP W/O HPV  Expanded    Other orders  -     Cancel: Liquid-based pap, screening          Subjective:      Patient ID: Evens Mcdonald is a 50 y o  female  Pt presents for her annual exam today--  She has complaints of thich brownish discharge, week after period x few months  Also, llq pain x ~ 6 motnhs--comes and goes, but more often than not  She has monthly cycles  H/o endometriosis  On ortho tri x several years  Bowel and bladder are regular  No breast concerns today  Last mammo--6/2020    Cultures and pap today  rx mammo  rx pelvic us  May change ocp if sxs persist      The following portions of the patient's history were reviewed and updated as appropriate: allergies, current medications, past family history, past medical history, past social history, past surgical history and problem list     Review of Systems   Constitutional: Negative for chills, fever and unexpected weight change     Gastrointestinal: Negative for abdominal pain, blood in stool, constipation and diarrhea  Genitourinary: Negative  Objective:      /80   Ht 5' 1" (1 549 m)   Wt 88 kg (194 lb)   LMP 06/17/2021 (Exact Date)   BMI 36 66 kg/m²          Physical Exam  Vitals and nursing note reviewed  Constitutional:       Appearance: She is well-developed  HENT:      Head: Normocephalic and atraumatic  Chest:      Breasts:         Right: No inverted nipple, mass, nipple discharge or skin change  Left: No inverted nipple, mass, nipple discharge or skin change  Abdominal:      Palpations: Abdomen is soft  Genitourinary:     Exam position: Supine  Labia:         Right: No rash, tenderness or lesion  Left: No rash, tenderness or lesion  Vagina: Normal       Cervix: No cervical motion tenderness, discharge or friability  Adnexa:         Right: No mass, tenderness or fullness  Left: No mass, tenderness or fullness  Musculoskeletal:      Cervical back: Normal range of motion  Lymphadenopathy:      Lower Body: No right inguinal adenopathy  No left inguinal adenopathy

## 2021-06-30 NOTE — PROGRESS NOTES
The patient is here for a yearly  She is due for a pap smear  pap normal HPV neg 6/8/18  The patient has a dull pain in her lower left side  The pain is consistent and is tender to touch  She has been having the pain for the last six-seven months  The pain is above her C/S incision  The patient is having dark brown, thick discharge  The patient has the discharge after the last three times that she had her period  The discharge would have it a week after her period  Her period is coming regularly  No vaginal itching or burning  No hot flashes or night sweats  No urinary issues  No bowel problems  No breast problems

## 2021-07-06 ENCOUNTER — OFFICE VISIT (OUTPATIENT)
Dept: ENDOCRINOLOGY | Facility: CLINIC | Age: 46
End: 2021-07-06
Payer: COMMERCIAL

## 2021-07-06 VITALS
WEIGHT: 193 LBS | HEIGHT: 61 IN | BODY MASS INDEX: 36.44 KG/M2 | HEART RATE: 72 BPM | DIASTOLIC BLOOD PRESSURE: 90 MMHG | SYSTOLIC BLOOD PRESSURE: 120 MMHG | TEMPERATURE: 97 F

## 2021-07-06 DIAGNOSIS — E55.9 VITAMIN D DEFICIENCY: ICD-10-CM

## 2021-07-06 DIAGNOSIS — E06.3 HYPOTHYROIDISM DUE TO HASHIMOTO'S THYROIDITIS: Primary | ICD-10-CM

## 2021-07-06 DIAGNOSIS — E03.8 HYPOTHYROIDISM DUE TO HASHIMOTO'S THYROIDITIS: Primary | ICD-10-CM

## 2021-07-06 PROCEDURE — 99213 OFFICE O/P EST LOW 20 MIN: CPT | Performed by: PHYSICIAN ASSISTANT

## 2021-07-06 NOTE — PROGRESS NOTES
Patient Progress Note    CC: hypothyroidism    Referring Provider  Rhonda Gasca, [address]  [facility],  [address] [place] [address]     History of Present Illness:     Patient is a 78-year-old female here for follow-up of hypothyroidism secondary to Hashimoto's thyroiditis  She has a history of positive TPO antibodies  Patient is on Synthroid 112 mcg 1 tablet daily Monday through Saturday and 1 5 tablets on   Patient is taking it 1 hr before breakfast on an empty stomach and at least 4 hrs apart from supplements  Tolerating medication well  No history of external radiation to head/neck/chest   No family history of thyroid cancer  No recent Iodine loading in form of medication, iodine or kelp supplements or radiological diagnostic studies  Most recent thyroid labs were within normal range, TSH 1 68 and free T4 1  16  Vitamin-D deficiency:  Vitamin-D previously low normal at 30  She is taking vitamin D3 1000 IU every other day  If she takes it more often, she feels constipated         Patient Active Problem List   Diagnosis    Hypothyroidism due to Hashimoto's thyroiditis    Vitamin D deficiency     Past Medical History:   Diagnosis Date    Disease of thyroid gland     Endometriosis     Sjogren's syndrome ([address] )       Past Surgical History:   Procedure Laterality Date     SECTION      WISDOM TOOTH EXTRACTION        Family History   Problem Relation Age of Onset    Heart disease Mother     Diabetes Mother     Hypertension Mother     Heart disease Father      Social History     Tobacco Use    Smoking status: Never Smoker    Smokeless tobacco: Never Used   Substance Use Topics    Alcohol use: Not Currently     Comment: social     Allergies   Allergen Reactions    Asa [Aspirin]     Avelox [Moxifloxacin]      Current Outpatient Medications   Medication Sig Dispense Refill    ADVAIR -21 MCG/ACT inhaler Inhale 2 puffs 2 (two) times a day Rinse mouth after use  2    albuterol (2 5 mg/3 mL) 0 083 % nebulizer solution Take 2 5 mg by nebulization every 6 (six) hours as needed for wheezing or shortness of breath      albuterol (2 5 mg/3 mL) 0 083 % nebulizer solution albuterol sulfate 2 5 mg/3 mL (0 083 %) solution for nebulization      albuterol (PROAIR HFA) 90 mcg/act inhaler ProAir HFA 90 mcg/actuation aerosol inhaler   USE 2 PUFFS EVERY 4 HOURS AS NEEDED  MAY USE 2 PUFFS 20-30 MINUTES BEFORE PHYSICAL EXERTION      albuterol (PROVENTIL HFA,VENTOLIN HFA) 90 mcg/act inhaler albuterol sulfate HFA 90 mcg/actuation aerosol inhaler   INHALE 2 PUFFS EVERY 4 HOURS AS NEEDED  MAY USE 2 PUFFS 20-30 MINUTES BEFORE PHYSICAL EXERTION      fluticasone-salmeterol (Advair HFA) 115-21 MCG/ACT inhaler       halobetasol (ULTRAVATE) 0 05 % cream halobetasol propionate 0 05 % topical cream   APPLY TWICE DAILY TO BACK AND LEGS      hydrochlorothiazide (HYDRODIURIL) 25 mg tablet hydrochlorothiazide 25 mg tablet   TAKE 1 TABLET BY MOUTH EVERY DAY      hydrochlorothiazide (HYDRODIURIL) 25 mg tablet Take 25 mg by mouth daily      hydroxychloroquine (PLAQUENIL) 200 mg tablet Take 100 mg by mouth 2 (two) times a day with meals      hydroxychloroquine (PLAQUENIL) 200 mg tablet Take 200 mg by mouth 2 (two) times a day      ipratropium-albuterol (DUO-NEB) 0 5-2 5 mg/3 mL nebulizer solution ipratropium 0 5 mg-albuterol 3 mg (2 5 mg base)/3 mL nebulization soln      loratadine (CLARITIN) 10 mg tablet Take 10 mg by mouth daily      norgestimate-ethinyl estradiol (ORTHO TRI-CYCLEN,TRINESSA) 0 18/0 215/0 25 MG-35 MCG per tablet Take 1 tablet by mouth daily 90 tablet 4    norgestimate-ethinyl estradiol (ORTHO TRI-CYCLEN,TRINESSA) 0 18/0 215/0 25 MG-35 MCG per tablet Tri-Estarylla (28) 0 18 mg(7)/0 215 mg(7)/0 25 mg(7)-35 mcg tablet      Synthroid 112 MCG tablet Take 1 tab daily Monday through Saturday and 1 5 tab sunday 100 tablet 1    mometasone-formoterol (Dulera) 200-5 MCG/ACT inhaler Dulera 200 mcg-5 mcg/actuation HFA aerosol inhaler   INHALE 2 PUFFS BY MOUTH EVERY 12 HOURS (Patient not taking: Reported on 6/30/2021)       No current facility-administered medications for this visit  Review of Systems   Constitutional: Negative for activity change, appetite change, fatigue and unexpected weight change  HENT: Negative for trouble swallowing  Eyes: Positive for visual disturbance (has eye doctor appointment )  Respiratory: Negative for shortness of breath  Cardiovascular: Negative for chest pain and palpitations  Gastrointestinal: Negative for constipation and diarrhea  Endocrine: Negative for cold intolerance and heat intolerance  Musculoskeletal: Positive for arthralgias  Skin:        Some discoloration on lips   Neurological: Negative for tremors  Psychiatric/Behavioral: Negative  Physical Exam:  Body mass index is 36 47 kg/m²  /90   Pulse 72   Temp (!) 97 °F (36 1 °C) (Tympanic)   Ht 5' 1" (1 549 m)   Wt 87 5 kg (193 lb)   LMP 06/17/2021 (Exact Date)   BMI 36 47 kg/m²    Wt Readings from Last 3 Encounters:   07/06/21 87 5 kg (193 lb)   06/30/21 88 kg (194 lb)   12/29/20 92 1 kg (203 lb)       Physical Exam  Vitals and nursing note reviewed  Constitutional:       Appearance: She is well-developed  HENT:      Head: Normocephalic  Eyes:      General: No scleral icterus  Pupils: Pupils are equal, round, and reactive to light  Neck:      Thyroid: No thyromegaly  Cardiovascular:      Rate and Rhythm: Normal rate and regular rhythm  Pulses:           Radial pulses are 2+ on the right side and 2+ on the left side  Heart sounds: No murmur heard  Pulmonary:      Effort: Pulmonary effort is normal  No respiratory distress  Breath sounds: Normal breath sounds  No wheezing  Musculoskeletal:      Cervical back: Neck supple  Skin:     General: Skin is warm and dry  Neurological:      Mental Status: She is alert        Deep Tendon Reflexes: Reflexes are normal and symmetric  Patient's shoes and socks were not removed  Labs:   Lab Results   Component Value Date    HGBA1C 5 5 10/22/2020       Lab Results   Component Value Date    CHOL 198 07/13/2015    HDL 42 07/13/2015    TRIG 88 07/13/2015       Lab Results   Component Value Date    GLUCOSE 82 07/13/2015    CALCIUM 8 2 (L) 10/11/2016     07/13/2015    K 3 9 10/11/2016    CO2 29 10/11/2016     10/11/2016    BUN 12 10/11/2016    CREATININE 0 83 10/11/2016        eGFR   Date Value Ref Range Status   10/11/2016 >60 0 ml/min/1 73sq m Final       Lab Results   Component Value Date    ALT 55 07/13/2015    AST 43 07/13/2015    ALKPHOS 52 07/13/2015    BILITOT 0 69 07/13/2015       Lab Results   Component Value Date    [id number] 7 226 (H) 09/29/2018         Plan:     Diagnoses and all orders for this visit:    Hypothyroidism due to Hashimoto's thyroiditis  Thyroid labs are within normal range, TSH 1 68 and free T4 1 16  Continue current dose of Synthroid    Patient clinically and biochemically euthyroid  Repeat labs prior to next visit   -     T4, free; Future  -     TSH, 3rd generation; Future    Vitamin D deficiency  Vitamin D previously low at 30   Continue current supplementation   -     Vitamin D 25 hydroxy; Future      Discussed with the patient and all questions fully answered  She will call me if any problems arise       Counseled patient on diagnostic results, prognosis, risk and benefit of treatment options, instruction for management, importance of treatment compliance, risk  factor reduction and impressions      Codi Nagel PA-C

## 2021-07-08 ENCOUNTER — HOSPITAL ENCOUNTER (OUTPATIENT)
Dept: RADIOLOGY | Facility: MEDICAL CENTER | Age: 46
Discharge: HOME | End: 2021-07-08
Payer: COMMERCIAL

## 2021-07-08 DIAGNOSIS — R10.2 PELVIC PAIN: ICD-10-CM

## 2021-07-08 DIAGNOSIS — R10.32 LLQ PAIN: ICD-10-CM

## 2021-07-08 PROCEDURE — 76856 US EXAM PELVIC COMPLETE: CPT

## 2021-07-08 PROCEDURE — 76830 TRANSVAGINAL US NON-OB: CPT

## 2021-07-15 ENCOUNTER — HOSPITAL ENCOUNTER (INPATIENT)
Facility: HOSPITAL | Age: 46
LOS: 1 days | Discharge: HOME | DRG: 066 | End: 2021-07-16
Attending: EMERGENCY MEDICINE | Admitting: INTERNAL MEDICINE
Payer: COMMERCIAL

## 2021-07-15 DIAGNOSIS — D68.59 PROTEIN S DEFICIENCY (HCC): ICD-10-CM

## 2021-07-15 DIAGNOSIS — I63.9 STROKE (HCC): Primary | ICD-10-CM

## 2021-07-15 PROCEDURE — 99285 EMERGENCY DEPT VISIT HI MDM: CPT

## 2021-07-15 PROCEDURE — 99285 EMERGENCY DEPT VISIT HI MDM: CPT | Performed by: EMERGENCY MEDICINE

## 2021-07-15 PROCEDURE — 82948 REAGENT STRIP/BLOOD GLUCOSE: CPT

## 2021-07-15 NOTE — Clinical Note
Case was discussed with Claudia Levin and the patient's admission status was agreed to be Admission Status: inpatient status to the service of Dr Sol Kaufman

## 2021-07-16 ENCOUNTER — APPOINTMENT (INPATIENT)
Dept: NON INVASIVE DIAGNOSTICS | Facility: HOSPITAL | Age: 46
DRG: 066 | End: 2021-07-16
Payer: COMMERCIAL

## 2021-07-16 ENCOUNTER — APPOINTMENT (INPATIENT)
Dept: MRI IMAGING | Facility: HOSPITAL | Age: 46
DRG: 066 | End: 2021-07-16
Payer: COMMERCIAL

## 2021-07-16 ENCOUNTER — APPOINTMENT (EMERGENCY)
Dept: CT IMAGING | Facility: HOSPITAL | Age: 46
DRG: 066 | End: 2021-07-16
Payer: COMMERCIAL

## 2021-07-16 VITALS
BODY MASS INDEX: 33.15 KG/M2 | RESPIRATION RATE: 18 BRPM | SYSTOLIC BLOOD PRESSURE: 148 MMHG | WEIGHT: 180.12 LBS | TEMPERATURE: 97.6 F | OXYGEN SATURATION: 97 % | HEIGHT: 62 IN | DIASTOLIC BLOOD PRESSURE: 86 MMHG | HEART RATE: 70 BPM

## 2021-07-16 PROBLEM — N80.9 ENDOMETRIOSIS: Status: ACTIVE | Noted: 2021-07-16

## 2021-07-16 PROBLEM — E66.9 CLASS 1 OBESITY IN ADULT: Status: ACTIVE | Noted: 2021-07-16

## 2021-07-16 PROBLEM — E66.811 CLASS 1 OBESITY IN ADULT: Status: ACTIVE | Noted: 2021-07-16

## 2021-07-16 PROBLEM — I16.0 HYPERTENSIVE URGENCY: Status: ACTIVE | Noted: 2021-07-16

## 2021-07-16 PROBLEM — J45.909 ASTHMA: Status: ACTIVE | Noted: 2021-07-16

## 2021-07-16 PROBLEM — M35.00 SJOGREN'S SYNDROME (HCC): Status: ACTIVE | Noted: 2021-07-16

## 2021-07-16 PROBLEM — I63.9 CVA (CEREBRAL VASCULAR ACCIDENT) (HCC): Status: ACTIVE | Noted: 2021-07-16

## 2021-07-16 LAB
ANION GAP SERPL CALCULATED.3IONS-SCNC: 7 MMOL/L (ref 4–13)
APTT PPP: 27 SECONDS (ref 23–37)
ATRIAL RATE: 85 BPM
B2 GLYCOPROT1 IGA SERPL IA-ACNC: 1.5
B2 GLYCOPROT1 IGG SERPL IA-ACNC: 1.7
B2 GLYCOPROT1 IGM SERPL IA-ACNC: <2.9
BUN SERPL-MCNC: 19 MG/DL (ref 5–25)
CALCIUM SERPL-MCNC: 8.5 MG/DL (ref 8.3–10.1)
CARDIOLIPIN IGA SER IA-ACNC: 2.6
CARDIOLIPIN IGG SER IA-ACNC: 1.4
CARDIOLIPIN IGM SER IA-ACNC: 103
CHLORIDE SERPL-SCNC: 107 MMOL/L (ref 100–108)
CO2 SERPL-SCNC: 27 MMOL/L (ref 21–32)
CREAT SERPL-MCNC: 1.08 MG/DL (ref 0.6–1.3)
DEPRECATED AT III PPP: 83 % OF NORMAL (ref 92–136)
ERYTHROCYTE [DISTWIDTH] IN BLOOD BY AUTOMATED COUNT: 13.5 % (ref 11.6–15.1)
EST. AVERAGE GLUCOSE BLD GHB EST-MCNC: 103 MG/DL
GFR SERPL CREATININE-BSD FRML MDRD: 61 ML/MIN/1.73SQ M
GLUCOSE SERPL-MCNC: 89 MG/DL (ref 65–140)
GLUCOSE SERPL-MCNC: 90 MG/DL (ref 65–140)
HBA1C MFR BLD: 5.2 %
HCT VFR BLD AUTO: 39.9 % (ref 34.8–46.1)
HGB BLD-MCNC: 12.8 G/DL (ref 11.5–15.4)
INR PPP: 0.94 (ref 0.84–1.19)
MCH RBC QN AUTO: 27.8 PG (ref 26.8–34.3)
MCHC RBC AUTO-ENTMCNC: 32.1 G/DL (ref 31.4–37.4)
MCV RBC AUTO: 87 FL (ref 82–98)
P AXIS: 58 DEGREES
PLATELET # BLD AUTO: 292 THOUSANDS/UL (ref 149–390)
PMV BLD AUTO: 10.2 FL (ref 8.9–12.7)
POTASSIUM SERPL-SCNC: 3.9 MMOL/L (ref 3.5–5.3)
PR INTERVAL: 158 MS
PROTHROMBIN TIME: 12.7 SECONDS (ref 11.6–14.5)
QRS AXIS: 13 DEGREES
QRSD INTERVAL: 72 MS
QT INTERVAL: 348 MS
QTC INTERVAL: 404 MS
RBC # BLD AUTO: 4.61 MILLION/UL (ref 3.81–5.12)
SARS-COV-2 RNA RESP QL NAA+PROBE: NEGATIVE
SODIUM SERPL-SCNC: 141 MMOL/L (ref 136–145)
T WAVE AXIS: 13 DEGREES
TROPONIN I SERPL-MCNC: <0.02 NG/ML
VENTRICULAR RATE: 81 BPM
WBC # BLD AUTO: 6.05 THOUSAND/UL (ref 4.31–10.16)

## 2021-07-16 PROCEDURE — A9585 GADOBUTROL INJECTION: HCPCS | Performed by: NURSE PRACTITIONER

## 2021-07-16 PROCEDURE — 85303 CLOT INHIBIT PROT C ACTIVITY: CPT | Performed by: NURSE PRACTITIONER

## 2021-07-16 PROCEDURE — 81241 F5 GENE: CPT | Performed by: NURSE PRACTITIONER

## 2021-07-16 PROCEDURE — 83036 HEMOGLOBIN GLYCOSYLATED A1C: CPT | Performed by: NURSE PRACTITIONER

## 2021-07-16 PROCEDURE — 86146 BETA-2 GLYCOPROTEIN ANTIBODY: CPT | Performed by: NURSE PRACTITIONER

## 2021-07-16 PROCEDURE — 85732 THROMBOPLASTIN TIME PARTIAL: CPT | Performed by: NURSE PRACTITIONER

## 2021-07-16 PROCEDURE — 70498 CT ANGIOGRAPHY NECK: CPT

## 2021-07-16 PROCEDURE — 85705 THROMBOPLASTIN INHIBITION: CPT | Performed by: NURSE PRACTITIONER

## 2021-07-16 PROCEDURE — 93005 ELECTROCARDIOGRAM TRACING: CPT

## 2021-07-16 PROCEDURE — 85670 THROMBIN TIME PLASMA: CPT | Performed by: NURSE PRACTITIONER

## 2021-07-16 PROCEDURE — 70553 MRI BRAIN STEM W/O & W/DYE: CPT

## 2021-07-16 PROCEDURE — 85300 ANTITHROMBIN III ACTIVITY: CPT | Performed by: NURSE PRACTITIONER

## 2021-07-16 PROCEDURE — 85730 THROMBOPLASTIN TIME PARTIAL: CPT

## 2021-07-16 PROCEDURE — 85610 PROTHROMBIN TIME: CPT

## 2021-07-16 PROCEDURE — 85613 RUSSELL VIPER VENOM DILUTED: CPT | Performed by: NURSE PRACTITIONER

## 2021-07-16 PROCEDURE — 93010 ELECTROCARDIOGRAM REPORT: CPT | Performed by: INTERNAL MEDICINE

## 2021-07-16 PROCEDURE — 97162 PT EVAL MOD COMPLEX 30 MIN: CPT

## 2021-07-16 PROCEDURE — 80048 BASIC METABOLIC PNL TOTAL CA: CPT

## 2021-07-16 PROCEDURE — 36415 COLL VENOUS BLD VENIPUNCTURE: CPT

## 2021-07-16 PROCEDURE — 84484 ASSAY OF TROPONIN QUANT: CPT

## 2021-07-16 PROCEDURE — 93306 TTE W/DOPPLER COMPLETE: CPT | Performed by: INTERNAL MEDICINE

## 2021-07-16 PROCEDURE — U0003 INFECTIOUS AGENT DETECTION BY NUCLEIC ACID (DNA OR RNA); SEVERE ACUTE RESPIRATORY SYNDROME CORONAVIRUS 2 (SARS-COV-2) (CORONAVIRUS DISEASE [COVID-19]), AMPLIFIED PROBE TECHNIQUE, MAKING USE OF HIGH THROUGHPUT TECHNOLOGIES AS DESCRIBED BY CMS-2020-01-R: HCPCS

## 2021-07-16 PROCEDURE — 86147 CARDIOLIPIN ANTIBODY EA IG: CPT | Performed by: NURSE PRACTITIONER

## 2021-07-16 PROCEDURE — 81240 F2 GENE: CPT | Performed by: NURSE PRACTITIONER

## 2021-07-16 PROCEDURE — 99236 HOSP IP/OBS SAME DATE HI 85: CPT | Performed by: INTERNAL MEDICINE

## 2021-07-16 PROCEDURE — 85306 CLOT INHIBIT PROT S FREE: CPT | Performed by: NURSE PRACTITIONER

## 2021-07-16 PROCEDURE — 85027 COMPLETE CBC AUTOMATED: CPT

## 2021-07-16 PROCEDURE — 93306 TTE W/DOPPLER COMPLETE: CPT

## 2021-07-16 PROCEDURE — 70496 CT ANGIOGRAPHY HEAD: CPT

## 2021-07-16 PROCEDURE — U0005 INFEC AGEN DETEC AMPLI PROBE: HCPCS

## 2021-07-16 PROCEDURE — 85305 CLOT INHIBIT PROT S TOTAL: CPT | Performed by: NURSE PRACTITIONER

## 2021-07-16 PROCEDURE — 97166 OT EVAL MOD COMPLEX 45 MIN: CPT

## 2021-07-16 PROCEDURE — 99255 IP/OBS CONSLTJ NEW/EST HI 80: CPT | Performed by: PSYCHIATRY & NEUROLOGY

## 2021-07-16 PROCEDURE — G1004 CDSM NDSC: HCPCS

## 2021-07-16 RX ORDER — LORATADINE 10 MG/1
10 TABLET ORAL DAILY
Status: DISCONTINUED | OUTPATIENT
Start: 2021-07-16 | End: 2021-07-16 | Stop reason: HOSPADM

## 2021-07-16 RX ORDER — CLOPIDOGREL BISULFATE 75 MG/1
600 TABLET ORAL ONCE
Status: COMPLETED | OUTPATIENT
Start: 2021-07-16 | End: 2021-07-16

## 2021-07-16 RX ORDER — CLOPIDOGREL BISULFATE 75 MG/1
75 TABLET ORAL DAILY
Status: DISCONTINUED | OUTPATIENT
Start: 2021-07-16 | End: 2021-07-16 | Stop reason: HOSPADM

## 2021-07-16 RX ORDER — ATORVASTATIN CALCIUM 40 MG/1
40 TABLET, FILM COATED ORAL EVERY EVENING
Qty: 30 TABLET | Refills: 0 | Status: SHIPPED | OUTPATIENT
Start: 2021-07-16 | End: 2022-08-04

## 2021-07-16 RX ORDER — FLUTICASONE FUROATE AND VILANTEROL 200; 25 UG/1; UG/1
1 POWDER RESPIRATORY (INHALATION) DAILY
Status: DISCONTINUED | OUTPATIENT
Start: 2021-07-16 | End: 2021-07-16 | Stop reason: HOSPADM

## 2021-07-16 RX ORDER — CLOPIDOGREL BISULFATE 75 MG/1
75 TABLET ORAL DAILY
Qty: 30 TABLET | Refills: 0 | Status: SHIPPED | OUTPATIENT
Start: 2021-07-17 | End: 2022-08-04

## 2021-07-16 RX ORDER — ATORVASTATIN CALCIUM 40 MG/1
40 TABLET, FILM COATED ORAL EVERY EVENING
Status: DISCONTINUED | OUTPATIENT
Start: 2021-07-16 | End: 2021-07-16 | Stop reason: HOSPADM

## 2021-07-16 RX ORDER — ACETAMINOPHEN 325 MG/1
650 TABLET ORAL EVERY 6 HOURS PRN
Status: DISCONTINUED | OUTPATIENT
Start: 2021-07-16 | End: 2021-07-16 | Stop reason: HOSPADM

## 2021-07-16 RX ORDER — LEVOTHYROXINE SODIUM 112 UG/1
168 TABLET ORAL
Status: DISCONTINUED | OUTPATIENT
Start: 2021-07-18 | End: 2021-07-16 | Stop reason: HOSPADM

## 2021-07-16 RX ORDER — LEVOTHYROXINE SODIUM 112 UG/1
112 TABLET ORAL
Status: DISCONTINUED | OUTPATIENT
Start: 2021-07-16 | End: 2021-07-16 | Stop reason: HOSPADM

## 2021-07-16 RX ORDER — ALBUTEROL SULFATE 90 UG/1
2 AEROSOL, METERED RESPIRATORY (INHALATION) EVERY 6 HOURS PRN
Status: DISCONTINUED | OUTPATIENT
Start: 2021-07-16 | End: 2021-07-16 | Stop reason: HOSPADM

## 2021-07-16 RX ADMIN — ACETAMINOPHEN 650 MG: 325 TABLET, FILM COATED ORAL at 08:28

## 2021-07-16 RX ADMIN — LORATADINE 10 MG: 10 TABLET ORAL at 08:28

## 2021-07-16 RX ADMIN — CLOPIDOGREL BISULFATE 75 MG: 75 TABLET ORAL at 08:28

## 2021-07-16 RX ADMIN — IOHEXOL 100 ML: 350 INJECTION, SOLUTION INTRAVENOUS at 00:56

## 2021-07-16 RX ADMIN — CLOPIDOGREL BISULFATE 600 MG: 75 TABLET ORAL at 01:04

## 2021-07-16 RX ADMIN — ATORVASTATIN CALCIUM 40 MG: 40 TABLET, FILM COATED ORAL at 17:59

## 2021-07-16 RX ADMIN — LEVOTHYROXINE SODIUM 112 MCG: 112 TABLET ORAL at 05:17

## 2021-07-16 RX ADMIN — GADOBUTROL 8 ML: 604.72 INJECTION INTRAVENOUS at 10:28

## 2021-07-16 NOTE — ASSESSMENT & PLAN NOTE
· Patient with no known diagnosis of hypertension; therefore, patient is not on any antihypertensives as an outpatient  · Present on admission, blood pressure of 235/101  Last recorded pressure: 151/82  Allow for permissive hypertension in the setting of CVA  Monitor blood pressure closely

## 2021-07-16 NOTE — ASSESSMENT & PLAN NOTE
Patient is a 50 y o  female with history of Hashimoto's thyroiditis, history of 1 miscarraige, vitamin-D deficiency and Sjogren's who presented as stroke alert on 7/15/2021 11:41 PM with initial NIHSS of 1 and LKW 2100 who presents with who presents with dysarthria and left sided facial droop  Speech abnormalities resolved on arrival  Initial BP on arrival was Blood Pressure: (!) 235/101  As a result of mild nondisabling sx patient was determined to not be a candidate for tPA   F/u exam: WNL with exception of mild left sided facial droop   BP over last 24 hours: Blood Pressure: 148/86  current BP: Blood Pressure: 148/86     Work up:  · HBA1C 5 2  ·   · Anticardiolipin antibody negative     Imaging:   CTH: small, chronic infarct in the right MCA territory  Superimposed small acute or subacute infarct along the margins cannot be entirely excluded   CTA:  occlusion of the right M2 branch   MRI: Chronic infarct right temporoparietal junction  No acute intracranial abnormality   Echocardiogram: EF 55%, normal atria     Impression: unclear etiology of stroke   Considerations include embolic source due to hypercoagulability 2/2 OCP use, underlying hypercoagulable state, unidentified cardiac arrhythmia vs vessel disease given history of hyperlipidemia vs hypertensive urgency    Plan:   Continue plavix 75 mg    Follow up with thrombosis panel results    Recommend scheduling outpatient CECELIA   If thrombosis panel and CECELIA negative, recommend loop recorder    Continue treatment with Lipitor 40 mg with goal LDL < 70   Stop OCP   Medical management as per primary team appreciated   Further workup can be done as outpatient   Stable for discharge with close follow up with stroke attending

## 2021-07-16 NOTE — DISCHARGE SUMMARY
[facility]  Discharge- Anne-Marie Bowman 1975, 50 y o  female MRN: [id number]  Unit/Bed#: S -01 Encounter: [id number]  Primary Care Provider: Maria C Tam MD   Date and time admitted to hospital: 7/15/2021 11:41 PM    * Stroke [facility])  Assessment & Plan   Presentation: Patient presents to ED due to complaints of left sided facial droop and dysarthria  Patient reports that around 2120 on 07/15/2021, she was calling to her 15year old daughter to practice her clarinet  Her daughter noticed that the patient's speech was slurred  The patient reports that she then experienced difficulty forming her words correctly  She states that around 2145 she laid down to put her younger daughter to bed and her daughter told the patient that the patient's face was slanted  The patient asked her older daughter who confirmed that she had a left sided facial droop  Patient presented to the ED for further evaluation  Patient reports mild headache and nausea upon arrival to ED  Patient was a stroke alert in the ED   CT: "Small, chronic infarct in the right MCA territory  Superimposed small acute or subacute infarct along the margins cannot be entirely excluded  No intracranial hemorrhage or mass effect     CTA:  "Occlusion of right MCA M2 branch  No stenosis, dissection or occlusion of the carotid or vertebral arteries     MRI Brain: Chronic infarct right temporoparietal junction  No acute intracranial abnormality   Echo: LVEF 55%, mild calcification of mitral valve   Stroke Pathway  o Frequent vital signs  o Neuro checks  o Telemetry  No events, NSR, HR 63   o Lipid panel from 06/24/2021: Total cholesterol 193, triglyceride 82, HDL 52,   o A1c 5 2  o Patient has allergy to aspirin  Patient was loaded with 600 mg of Plavix in ED   o Initiate atorvastatin 40mg daily     Plan:  - Thrombosis panel pending  Will follow up outpatient neurology  - Hold oral contraceptive   Follow up with OBGYN for alternative non-estrogen therapy  - continue atorvastatin 40mg daily   - continue clopidogrel 75mg daily     Hypertensive urgency  Assessment & Plan  · Patient with no known diagnosis of hypertension; therefore, patient is not on any antihypertensives as an outpatient  · Present on admission, blood pressure of 235/101  Bps returned to 140s/80s without any antihypertensives    Plan:  - continue to monitor outpatient     Endometriosis  Assessment & Plan  · Prior to admission, patient was on norgestimate-ethinyl estradiol  Will hold in the setting of CVA  Plan:  - outpatient OBGYN follow up for alternative, non-estrogen containing therapy options     Class 1 obesity in adult  Assessment & Plan  · Encouraged lifestyle modifications  Sjogren's syndrome [facility])  Assessment & Plan  · Patient follows with rheumatology as an outpatient  · Plaquenil recently discontinued on 07/06/2021 due to adverse reaction of skin discoloration  Asthma  Assessment & Plan  · Not in acute exacerbation  Plan: Continue home maintenance regimen of Advair, Proventil p r n  Hypothyroidism due to Hashimoto's thyroiditis  Assessment & Plan  · Continue levothyroxine  Medical Problems     Resolved Problems  Date Reviewed: 7/16/2021    None              Discharging Resident: Cammie Greenberg MD  Discharging Attending: Aubrie Parks MD  PCP: Katie Harrison MD  Admission Date:   Admission Orders (From admission, onward)     Ordered        07/16/21 0209  Inpatient Admission  Once                   Discharge Date: 07/16/21    Consultations During Hospital Stay:  · neurology    Procedures Performed:   · Echo    Significant Findings / Test Results:    CT head: "Small, chronic infarct in the right MCA territory  Superimposed small acute or subacute infarct along the margins cannot be entirely excluded  No intracranial hemorrhage or mass effect     CTA head/neck:  "Occlusion of right MCA M2 branch    No stenosis, dissection or occlusion of the carotid or vertebral arteries     MRI Brain: Chronic infarct right temporoparietal junction  No acute intracranial abnormality   Echo: LVEF 55%, mild calcification of mitral valve    Incidental Findings:   · none     Test Results Pending at Discharge (will require follow up): · Thrombosis panel - neurology follow up      Outpatient Tests Requested:  · Transesophageal echocardiogram      Complications:  none    Reason for Admission: stroke alert     Hospital Course:   Carter Singh is a 50 y o  female patient who originally presented to the hospital on 7/15/2021 due to acute onset left-sided facial droop and dysarthria  CT head showed a small chronic infarct in the right MCA territory and initial concern for a possible acute or subacute infarct  CTA head and neck showed occlusion of the right MCA M2 branch  She was started on stroke pathway and loaded with 600 mg Plavix  No aspirin was initiated as the patient has a significant allergy to aspirin  She was also started on a statin at that time  Her symptoms and began to improve while in the emergency department, and were fully improved by the following morning with no residual deficits  MRI brain showed a chronic infarct of the right temporoparietal junction but no acute evidence of stroke  She was cleared by Physical therapy, Occupational therapy, and speech and swallow  Neurology stated that given her stable and improving condition, in addition to the lack of evidence of acute stroke on imaging, the patient was safe for discharge and could follow up with Neurology on an outpatient basis for further workup  She will be scheduled for an outpatient transesophageal echocardiogram upon discharge for Neurology to review at her outpatient appointment      The patient, initially admitted to the hospital as inpatient, was discharged earlier than expected given the following: seen by neurology, felt this was unlikely an acute stroke and work up could be completed on an outpatient bases       Please see above list of diagnoses and related plan for additional information  Condition at Discharge: good    Discharge Day Visit / Exam:   Subjective:  No complaints at the time of discharge  Patient remains nervous about the potential etiology her symptoms  Was reassured that she would have close outpatient Neurology follow-up  Vitals: Blood Pressure: 148/86 (07/16/21 1500)  Pulse: 70 (07/16/21 1500)  Temperature: 97 6 °F (36 4 °C) (07/16/21 1500)  Temp Source: Oral (07/16/21 1500)  Respirations: 18 (07/16/21 1500)  Height: 5' 1 5" (156 2 cm) (07/16/21 0018)  Weight - Scale: 81 7 kg (180 lb 1 9 oz) (07/16/21 0018)  SpO2: 97 % (07/16/21 1500)  Exam:   Physical Exam  Vitals and nursing note reviewed  Constitutional:       General: She is not in acute distress  Appearance: Normal appearance  She is not diaphoretic  HENT:      Head: Normocephalic and atraumatic  Mouth/Throat:      Mouth: Mucous membranes are moist    Cardiovascular:      Rate and Rhythm: Normal rate and regular rhythm  Pulmonary:      Effort: Pulmonary effort is normal       Breath sounds: Normal breath sounds  No stridor  No wheezing, rhonchi or rales  Abdominal:      General: Bowel sounds are normal       Palpations: Abdomen is soft  There is no mass  Tenderness: There is no abdominal tenderness  There is no guarding  Musculoskeletal:      Right lower leg: No edema  Left lower leg: No edema  Skin:     General: Skin is warm and dry  Neurological:      General: No focal deficit present  Mental Status: She is alert  Cranial Nerves: No cranial nerve deficit  Sensory: No sensory deficit  Motor: No weakness        Gait: Gait normal       Deep Tendon Reflexes: Reflexes normal    Psychiatric:         Mood and Affect: Mood normal          Behavior: Behavior normal         Discussion with Family: Updated  () at bedside  Discharge instructions/Information to patient and family:   See after visit summary for information provided to patient and family  Provisions for Follow-Up Care:  See after visit summary for information related to follow-up care and any pertinent home health orders  Disposition:   Home    Planned Readmission:  None    Discharge Medications:  See after visit summary for reconciled discharge medications provided to patient and/or family        **Please Note: This note may have been constructed using a voice recognition system**

## 2021-07-16 NOTE — QUICK NOTE
Stroke Alert    Notified 12:18am, responded by phone 12:20am    51 y/o F with Sjogren's that presents with onset of dysarthria and L facial droop, now improved with residual slight facial droop per report  LKN 9pm  NIHSS 1 currently with just mild facial palsy, speech at baseline  HCT and CTA reviewed - potentially subacute-appearing R parietal infarct without clear evidence of early ischemic changes; possible occlusion of an anterior R distal M2/proximal M3 branch  Not a tPA candidate due to mild, non-disabling deficit  Not a candidate for thrombectomy given current mild deficit and location of lesion  Would load with 325mg ASA and 600mg Plavix and admit to stroke pathway  Differential includes recrudescence in setting of prior stroke - infectious/metabolic work-up  Recommend checking thrombosis panel as well

## 2021-07-16 NOTE — ASSESSMENT & PLAN NOTE
· Patient follows with rheumatology as an outpatient  · Plaquenil recently discontinued on 07/06/2021 due to adverse reaction of skin discoloration

## 2021-07-16 NOTE — OCCUPATIONAL THERAPY NOTE
Occupational Therapy Evaluation     Patient Name: Melissa Marin  Today's Date: 2021  Problem List  Principal Problem:    Stroke [facility])  Active Problems:    Hypothyroidism due to Hashimoto's thyroiditis    Asthma    Sjogren's syndrome ([facility] [address] )    Class 1 obesity in adult    Endometriosis    Hypertensive urgency    Past Medical History  Past Medical History:   Diagnosis Date    Disease of thyroid gland     Endometriosis     Sjogren's syndrome ([facility] [address] )      Past Surgical History  Past Surgical History:   Procedure Laterality Date     SECTION      WISDOM TOOTH EXTRACTION               21 08   OT Last Visit   OT Visit Date 21   Note Type   Note type Evaluation   Restrictions/Precautions   Weight Bearing Precautions Per Order No   Pain Assessment   Pain Assessment Tool Pain Assessment not indicated - pt denies pain   Pain Score No Pain   Home Living   Type of Home House   Home Layout Two level; Laundry in basement;Performs ADLs on one level  ([address], [employer], 2nd floor is in-law suite)   Bathroom Shower/Tub Tub/shower unit   Bathroom Toilet Standard   Bathroom Equipment Grab bars in shower;Grab bars around toilet   Bathroom Accessibility Accessible   Additional Comments no use of AD at baseline   Prior Function   Level of [place] Independent with ADLs and functional mobility   Lives With Family  ( and 2 children 6 and 15 yr olds)   Gage Abdul 30 in the last 6 months 0   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[employer])   Comments (+)drives   Lifestyle   Autonomy PTA pt living with family in [facility], pt (I) with ADLs and IADLs, no use of AD at baseline, (-)falls, (+)works, (+)drives   Reciprocal Relationships supportive family   Service to Others pt reports being laid off during covid and has since been working part time at [email or web address] enjoys cooking and baking   ADL Eating Assistance 7  Independent   Grooming Assistance 7  Independent   UB Bathing Assistance 7  Independent   LB Bathing Assistance 7  Independent   UB Dressing Assistance 7  Independent   LB Dressing Assistance 7  Independent   Toileting Assistance  7  Independent   Bed Mobility   Supine to Sit 7  Independent   Sit to Supine 7  Independent   Transfers   Sit to Stand 7  Independent   Stand to Sit 7  Independent   Functional Mobility   Functional Mobility 7  Independent   Additional Comments functional household distance   Activity Tolerance   Activity Tolerance Patient tolerated treatment well   RUE Assessment   RUE Assessment WFL   LUE Assessment   LUE Assessment WFL   Hand Function   Gross Motor Coordination Functional   Fine Motor Coordination Functional   Sensation   Light Touch No apparent deficits   Sharp/Dull No apparent deficits   Vision-Basic Assessment   Current Vision No visual deficits   Vision - Complex Assessment   Ocular Range of Motion WFL   Head Position WDL   Perception   Inattention/Neglect Appears intact   Cognition   Overall Cognitive Status WFL   Arousal/Participation Alert; Cooperative   Attention Within functional limits   Orientation Level Oriented X4   Memory Within functional limits   Following Commands Follows all commands and directions without difficulty   Comments pleasant and cooperative   Assessment   Prognosis Good   Assessment Patient is a 50 y o  female admitted to [address] [place] [address] on 7/15/2021 due to Stroke [facility])  Comorbidities affecting pt's physical performance at time of assessment include Sjogren's syndrome, Hashimotos thyroiditis  Patient has active OT orders  PTA pt living with family in [facility], pt (I) with ADLs and IADLs, no use of AD at baseline, (-)falls, (+)works, (+)drives  At the time of evaluation patient currently requires (I) for UB ADLs, (I) for LB ADLs, (I) for functional transfers, and (I) for functional mobility   No further acute OT needs identified at this time  Recommend continued mobilization with hospital staff and restorative services while in the hospital to increase pts endurance and strength upon D/C  From OT standpoint, recommend D/C to home with family support when medically cleared  D/C pt from OT caseload at this time  The patient's raw score on the AM-PAC Daily Activity inpatient short form is 24, standardized score is 57 54, greater than 39 4  Patients at this level are likely to benefit from discharge to home  Please refer to the recommendation of the Occupational Therapist for safe discharge planning     Goals   Patient Goals to go home   Plan   OT Frequency Eval only   Recommendation   OT Discharge Recommendation No rehabilitation needs   AM-PAC Daily Activity Inpatient   Lower Body Dressing 4   Bathing 4   Toileting 4   Upper Body Dressing 4   Grooming 4   Eating 4   Daily Activity Raw Score 24   Daily Activity Standardized Score (Calc for Raw Score >=11) 57 54   AM-PAC Applied Cognition Inpatient   Following a Speech/Presentation 4   Understanding Ordinary Conversation 4   Taking Medications 4   Remembering Where Things Are Placed or Put Away 4   Remembering List of 4-5 Errands 4   Taking Care of Complicated Tasks 4   Applied Cognition Raw Score 24   Applied Cognition Standardized Score 62 21   Barthel Index   Feeding 10   Bathing 5   Grooming Score 5   Dressing Score 10   Bladder Score 10   Bowels Score 10   Toilet Use Score 10   Transfers (Bed/Chair) Score 15   Mobility (Level Surface) Score 15   Stairs Score 10   Barthel Index Score 100        At the end of the session, all needs met and pt seated in bedside chair and call bell within [facility], OTR/L No

## 2021-07-16 NOTE — ASSESSMENT & PLAN NOTE
· Prior to admission, patient was on norgestimate-ethinyl estradiol  Will hold in the setting of CVA

## 2021-07-16 NOTE — ED PROVIDER NOTES
History  Chief Complaint   Patient presents with    Facial Droop     Pt presents to the ED with c/o left sided facial droop and slurred speech since 2130  Pt reports speech is normal now  Patient is a 55-year-old female here today for left-sided facial droop and slurred speech  At around 9:30 p m  This evening the patient's daughter noticed that the left side of the patient's face was drooped and that her speech was slurred  Patient also had some aphasia  Daughter believes that the last known normal was around 9:00 p m  [place] Patient denies any recent headaches, fevers, or chills  Patient denies a history of AFib her previous stroke  Patient denies any neurologic deficits, including motor or sensory at this time  History provided by:  Patient and spouse   used: No        Prior to Admission Medications   Prescriptions Last Dose Informant Patient Reported? Taking?    ADVAIR -21 MCG/ACT inhaler 7/16/2021 at Unknown time  Yes Yes   Sig: Inhale 2 puffs 2 (two) times a day Rinse mouth after use   Synthroid 112 MCG tablet 7/16/2021 at Unknown time  No Yes   Sig: Take 1 tab daily Monday through Saturday and 1 5 tab sunday   albuterol (2 5 mg/3 mL) 0 083 % nebulizer solution More than a month at Unknown time  Yes No   Sig: Take 2 5 mg by nebulization every 6 (six) hours as needed for wheezing or shortness of breath   albuterol (2 5 mg/3 mL) 0 083 % nebulizer solution   Yes No   Sig: albuterol sulfate 2 5 mg/3 mL (0 083 %) solution for nebulization   albuterol (PROAIR HFA) 90 mcg/act inhaler More than a month at Unknown time  Yes No   Sig: ProAir HFA 90 mcg/actuation aerosol inhaler   USE 2 PUFFS EVERY 4 HOURS AS NEEDED  MAY USE 2 PUFFS 20-30 MINUTES BEFORE PHYSICAL EXERTION   albuterol (PROVENTIL HFA,VENTOLIN HFA) 90 mcg/act inhaler   Yes No   Sig: albuterol sulfate HFA 90 mcg/actuation aerosol inhaler   INHALE 2 PUFFS EVERY 4 HOURS AS NEEDED  MAY USE 2 PUFFS 20-30 MINUTES BEFORE PHYSICAL EXERTION   halobetasol (ULTRAVATE) 0 05 % cream Not Taking at Unknown time  Yes No   Sig: halobetasol propionate 0 05 % topical cream   APPLY TWICE DAILY TO BACK AND LEGS   Patient not taking: Reported on 7/16/2021   hydrochlorothiazide (HYDRODIURIL) 25 mg tablet Not Taking at Unknown time  Yes No   Sig: hydrochlorothiazide 25 mg tablet   TAKE 1 TABLET BY MOUTH EVERY DAY   Patient not taking: Reported on 7/16/2021   hydrochlorothiazide (HYDRODIURIL) 25 mg tablet Not Taking at Unknown time  Yes No   Sig: Take 25 mg by mouth daily   Patient not taking: Reported on 7/16/2021   hydroxychloroquine (PLAQUENIL) 200 mg tablet Not Taking at Unknown time Self Yes No   Sig: Take 100 mg by mouth 2 (two) times a day with meals   Patient not taking: Reported on 7/16/2021   hydroxychloroquine (PLAQUENIL) 200 mg tablet Not Taking at Unknown time  Yes No   Sig: Take 200 mg by mouth 2 (two) times a day   Patient not taking: Reported on 7/16/2021   ipratropium-albuterol (DUO-NEB) 0 5-2 5 mg/3 mL nebulizer solution Not Taking at Unknown time  Yes No   Sig: ipratropium 0 5 mg-albuterol 3 mg (2 5 mg base)/3 mL nebulization soln   Patient not taking: Reported on 7/16/2021   loratadine (CLARITIN) 10 mg tablet 7/16/2021 at Unknown time  Yes Yes   Sig: Take 10 mg by mouth daily   mometasone-formoterol (Dulera) 200-5 MCG/ACT inhaler Not Taking at Unknown time  Yes No   Sig: Dulera 200 mcg-5 mcg/actuation HFA aerosol inhaler   INHALE 2 PUFFS BY MOUTH EVERY 12 HOURS   Patient not taking: Reported on 6/30/2021   norgestimate-ethinyl estradiol (ORTHO TRI-CYCLEN,TRINESSA) 0 18/0 215/0 25 MG-35 MCG per tablet 7/16/2021 at Unknown time  No Yes   Sig: Take 1 tablet by mouth daily   norgestimate-ethinyl estradiol (ORTHO TRI-CYCLEN,TRINESSA) 0 18/0 215/0 25 MG-35 MCG per tablet   Yes No   Sig: Tri-Estarylla (28) 0 18 mg(7)/0 215 mg(7)/0 25 mg(7)-35 mcg tablet      Facility-Administered Medications: None       Past Medical History:   Diagnosis Date    Disease of thyroid gland     Endometriosis     Sjogren's syndrome ([address] )        Past Surgical History:   Procedure Laterality Date     SECTION      WISDOM TOOTH EXTRACTION         Family History   Problem Relation Age of Onset    Heart disease Mother     Diabetes Mother     Hypertension Mother     Heart disease Father      I have reviewed and agree with the history as documented  E-Cigarette/Vaping    E-Cigarette Use Never User      E-Cigarette/Vaping Substances     Social History     Tobacco Use    Smoking status: Never Smoker    Smokeless tobacco: Never Used   Vaping Use    Vaping Use: Never used   Substance Use Topics    Alcohol use: Not Currently     Comment: social    Drug use: No        Review of Systems   Constitutional: Negative for chills and fever  HENT: Negative for ear pain and sore throat  Eyes: Negative for pain and visual disturbance  Respiratory: Negative for cough and shortness of breath  Cardiovascular: Negative for chest pain and palpitations  Gastrointestinal: Negative for abdominal pain and vomiting  Genitourinary: Negative for dysuria and hematuria  Musculoskeletal: Negative for arthralgias and back pain  Skin: Negative for color change and rash  Neurological: Positive for facial asymmetry  Negative for dizziness, seizures, syncope, speech difficulty, numbness and headaches  Psychiatric/Behavioral: Negative for agitation and confusion  All other systems reviewed and are negative        Physical Exam  ED Triage Vitals   Temperature Pulse Respirations Blood Pressure SpO2   07/15/21 2344 07/15/21 2344 07/15/21 2344 07/15/21 2344 07/15/21 2344   98 6 °F (37 °C) 84 14 (!) 235/101 97 %      Temp Source Heart Rate Source Patient Position - Orthostatic VS BP Location FiO2 (%)   07/15/21 2344 07/15/21 2344 21 0000 07/15/21 2344 --   Oral Monitor Lying Right arm       Pain Score       07/15/21 2344       No Pain             Orthostatic Vital Signs  Vitals:    07/16/21 0115 07/16/21 0130 07/16/21 0145 07/16/21 0200   BP: 164/76 (!) 172/82 169/81 151/82   Pulse: 76 75 74 72   Patient Position - Orthostatic VS: Lying Lying Lying Lying       Physical Exam  Vitals and nursing note reviewed  Constitutional:       Appearance: Normal appearance  HENT:      Head: Normocephalic and atraumatic  Right Ear: External ear normal       Left Ear: External ear normal       Mouth/Throat:      Mouth: Mucous membranes are moist       Pharynx: Oropharynx is clear  Eyes:      General: No scleral icterus  Extraocular Movements: Extraocular movements intact  Conjunctiva/sclera: Conjunctivae normal       Pupils: Pupils are equal, round, and reactive to light  Neck:      Vascular: No carotid bruit  Cardiovascular:      Rate and Rhythm: Normal rate and regular rhythm  Pulses: Normal pulses  Heart sounds: Normal heart sounds  Pulmonary:      Effort: Pulmonary effort is normal  No respiratory distress  Breath sounds: Normal breath sounds  Abdominal:      General: Abdomen is flat  There is no distension  Tenderness: There is no abdominal tenderness  Musculoskeletal:         General: No tenderness or signs of injury  Cervical back: Neck supple  No rigidity  Skin:     General: Skin is warm  Coloration: Skin is not jaundiced  Findings: No erythema or rash  Neurological:      Mental Status: She is alert and oriented to person, place, and time  GCS: GCS eye subscore is 4  GCS verbal subscore is 5  GCS motor subscore is 6  Cranial Nerves: Facial asymmetry present  Sensory: Sensation is intact  Motor: Motor function is intact  Coordination: Coordination is intact  Gait: Gait is intact  Comments: The left side of the patient's face is slightly drooped  Patient is speaking normally at this time  She is answering questions appropriately  She does not have any signs of aphasia  Psychiatric:         Mood and Affect: Mood normal          Behavior: Behavior normal          ED Medications  Medications   iohexol (OMNIPAQUE) 350 MG/ML injection (SINGLE-DOSE) 100 mL (100 mL Intravenous Given 7/16/21 0056)   clopidogrel (PLAVIX) tablet 600 mg (600 mg Oral Given 7/16/21 0104)       Diagnostic Studies  Results Reviewed     Procedure Component Value Units Date/Time    Troponin I [[id number]]  (Normal) Collected: 07/16/21 0022    Lab Status: Final result Specimen: Blood from Arm, Left Updated: 07/16/21 0053     Troponin I <0 02 ng/mL     Basic metabolic panel [[id number]] Collected: 07/16/21 0022    Lab Status: Final result Specimen: Blood from Arm, Left Updated: 07/16/21 0045     Sodium 141 mmol/L      Potassium 3 9 mmol/L      Chloride 107 mmol/L      CO2 27 mmol/L      ANION GAP 7 mmol/L      BUN 19 mg/dL      Creatinine 1 08 mg/dL      Glucose 89 mg/dL      Calcium 8 5 mg/dL      eGFR 61 ml/min/1 73sq m     Narrative:      [place] guidelines for Chronic Kidney Disease (CKD):     Stage 1 with normal or high GFR (GFR > 90 mL/min/1 73 square meters)    Stage 2 Mild CKD (GFR = 60-89 mL/min/1 73 square meters)    Stage 3A Moderate CKD (GFR = 45-59 mL/min/1 73 square meters)    Stage 3B Moderate CKD (GFR = 30-44 mL/min/1 73 square meters)    Stage 4 Severe CKD (GFR = 15-29 mL/min/1 73 square meters)    Stage 5 End Stage CKD (GFR <15 mL/min/1 73 square meters)  Note: GFR calculation is accurate only with a steady state creatinine    Protime-INR [[id number]]  (Normal) Collected: 07/16/21 0022    Lab Status: Final result Specimen: Blood from Arm, Left Updated: 07/16/21 0045     Protime 12 7 seconds      INR 0 94    APTT [[id number]]  (Normal) Collected: 07/16/21 0022    Lab Status: Final result Specimen: Blood from Arm, Left Updated: 07/16/21 0045     PTT 27 seconds     CBC and Platelet [[id number]]  (Normal) Collected: 07/16/21 0022    Lab Status: Final result Specimen: Blood from Arm, Left Updated: 07/16/21 0040     WBC 6 05 Thousand/uL      RBC 4 61 Million/uL      Hemoglobin 12 8 g/dL      Hematocrit 39 9 %      MCV 87 fL      MCH 27 8 pg      MCHC 32 1 g/dL      RDW 13 5 %      Platelets 034 Thousands/uL      MPV 10 2 fL     Novel Coronavirus (Covid-19),PCR [facility] - 2 hour stat [[id number]] Collected: 07/16/21 0022    Lab Status: In process Specimen: Nares from Nose Updated: 07/16/21 0030    Fingerstick Glucose (POCT) [[id number]]  (Normal) Collected: 07/15/21 3754    Lab Status: Final result Updated: 07/16/21 0002     POC Glucose 90 mg/dl                  CTA stroke alert (head/neck)   Final Result by Lane Goldman MD (07/16 8444)         1  Occlusion of a right MCA M2 branch  2   No stenosis, dissection or occlusion of the carotid or vertebral arteries  Findings were directly discussed with [facility] on 7/16/2021 1:07 AM                      Workstation performed: [id number]         CT stroke alert brain   Final Result by Lane Goldman MD (07/16 6079)         1  Small, chronic infarct in the right MCA territory  Superimposed small acute or subacute infarct along the margins cannot be entirely excluded  2   No intracranial hemorrhage or mass effect           Findings were directly discussed with [facility] on 7/16/2021 12:41 AM       Workstation performed: [id number]               Procedures  Procedures      ED Course                 Stroke Assessment     Row Name 07/16/21 0136             NIH Stroke Scale    Interval  Baseline      Level of Consciousness (1a )  0      LOC Questions (1b )  0      LOC Commands (1c )  0      Best Gaze (2 )  0      Visual (3 )  0      Facial Palsy (4 )  1      Motor Arm, Left (5a )  0      Motor Arm, Right (5b )  0      Motor Leg, Left (6a )  0      Motor Leg, Right (6b )  0      Limb Ataxia (7 )  0      Sensory (8 )  0      Best Language (9 )  0      Dysarthria (10 )  0      Extinction and Inattention (11 ) (Formerly Neglect)  0      Total  1          First Filed Value   TPA Decision  Patient not a TPA candidate  Patient is not a candidate options  Symptoms resolved/clearly non disabling  MDM  Number of Diagnoses or Management Options  Stroke [facility]): new and requires workup     Amount and/or Complexity of Data Reviewed  Clinical lab tests: ordered and reviewed  Tests in the radiology section of CPT®: ordered and reviewed  Review and summarize past medical records: yes  Independent visualization of images, tracings, or specimens: yes    Risk of Complications, Morbidity, and/or Mortality  General comments: Patient is a 12-year-old female here today for left-sided facial droop, slurred speech, and aphasia  At around 9:30 p m  The patient's daughter noticed these symptoms  The patient's daughter believes that her last known normal was around 9:00 p m  Francheska Brendan Upon arrival to the emergency department, patient's speech had improved and she no longer was slurred  She did still have some left-sided facial droop  There were no other focal sensory or motor deficits  NIH Stroke Scale of 1  Stroke alert was called and neurology was consulted  Given the minimal severity of the patient's symptoms neurology did not believe that the patient was a tPA candidate  CT scan showed two areas of occlusion, one subacute right parietal stroke, which neurology believed may have been old, and an area of occlusion in a distal branch of the right MCA  Given the mild nature of the patient's symptoms neurology did not believe the patient was a candidate for thrombectomy  Neurology recommended 325 mg aspirin and 600 mg Plavix, however patient has an allergy to aspirin  Patient given 600 mg of Plavix and admitted to the stroke pathway      Patient Progress  Patient progress: stable      Disposition  Final diagnoses:   Stroke [facility])     Time reflects when diagnosis was documented in both MDM as applicable and the Disposition within this note     Time User Action Codes Description Comment    7/16/2021 12:17 AM Hong Cunningham Add [I63 9] Stroke [facility])       ED Disposition     ED Disposition Condition Date/Time Comment    Admit Stable Fri Jul 16, 2021  2:09 AM Case was discussed with Richy Fan and the patient's admission status was agreed to be Admission Status: inpatient status to the service of Dr Yue Bey  Follow-up Information    None         Patient's Medications   Discharge Prescriptions    No medications on file     No discharge procedures on file  PDMP Review     None           ED Provider  Attending physically available and evaluated Morgan Lloyd  I managed the patient along with the ED Attending      Electronically Signed by         Dennys Landry DO  07/16/21 0215

## 2021-07-16 NOTE — DISCHARGE INSTRUCTIONS
Transient Ischemic Attack   WHAT YOU NEED TO KNOW:   A transient ischemic attack (TIA), or mini-stroke, happens when blood cannot flow to part of the brain  A TIA only lasts minutes to hours and does not cause lasting damage  It is still important to get immediate medical care  A TIA may be a warning that you are about to have an ischemic stroke  An ischemic stroke happens when blood flow to the brain is suddenly blocked, usually by a blood clot  DISCHARGE INSTRUCTIONS:   Call your local emergency number (911 in the [address] [facility]) or have someone else call if:   · You have any of the following signs of a stroke:      ? Numbness or drooping on one side of your face     ? Weakness in an arm or leg    ? Confusion or difficulty speaking    ? Dizziness, a severe headache, or vision loss       · You have a seizure  · You have chest pain or shortness of breath  · You cough up blood  Seek care immediately if:   · Your arm or leg feels warm, tender, and painful  It may look swollen and red  · You have unusual or heavy bleeding  · You have a severe headache or feel dizzy  Call your doctor or neurologist if:   · Your blood pressure or blood sugar level is higher or lower than you were told it should be  · You have questions or concerns about your condition or care  Warning signs of a stroke: The words BE FAST can help you remember and recognize warning signs of a stroke:  · B = Balance:  Sudden loss of balance    · E = Eyes:  Loss of vision in one or both eyes    · F = Face:  Face droops on one side    · A = Arms:  Arm drops when both arms are raised    · S = Speech:  Speech is slurred or sounds different    · T = Time:  Time to get help immediately     Medicines: You may need any of the following:  · Antiplatelets , such as aspirin, help prevent blood clots  Take your antiplatelet medicine exactly as directed  These medicines make it more likely for you to bleed or bruise   If you are told to take aspirin, do not take acetaminophen or ibuprofen instead  · Blood thinners  help prevent blood clots  Clots can cause strokes, heart attacks, and death  The following are general safety guidelines to follow while you are taking a blood thinner:    ? Watch for bleeding and bruising while you take blood thinners  Watch for bleeding from your gums or nose  Watch for blood in your urine and bowel movements  Use a soft washcloth on your skin, and a soft toothbrush to brush your teeth  This can keep your skin and gums from bleeding  If you shave, use an electric shaver  Do not play contact sports  ? Tell your dentist and other healthcare providers that you take a blood thinner  Wear a bracelet or necklace that says you take this medicine  ? Do not start or stop any other medicines unless your healthcare provider tells you to  Many medicines cannot be used with blood thinners  ? Take your blood thinner exactly as prescribed by your healthcare provider  Do not skip does or take less than prescribed  Tell your provider right away if you forget to take your blood thinner, or if you take too much  ? Warfarin  is a blood thinner that you may need to take  The following are things you should be aware of if you take warfarin:     § Foods and medicines can affect the amount of warfarin in your blood  Do not make major changes to your diet while you take warfarin  Warfarin works best when you eat about the same amount of vitamin K every day  Vitamin K is found in green leafy vegetables and certain other foods  Ask for more information about what to eat when you are taking warfarin  § You will need to see your healthcare provider for follow-up visits when you are on warfarin  You will need regular blood tests  These tests are used to decide how much medicine you need  · Other medicines  may be needed to treat diabetes, depression, high cholesterol, or blood pressure problems   You may also need medicine to decrease the pressure in your brain, reduce pain, or prevent seizures  · Take your medicine as directed  Contact your healthcare provider if you think your medicine is not helping or if you have side effects  Tell him of her if you are allergic to any medicine  Keep a list of the medicines, vitamins, and herbs you take  Include the amounts, and when and why you take them  Bring the list or the pill bottles to follow-up visits  Carry your medicine list with you in case of an emergency  What you can do to prevent another TIA or a stroke:   · Manage health conditions  A condition such as diabetes can increase your risk for a stroke  Control your blood sugar level if you have hyperglycemia or diabetes  Take your prescribed medicines and check your blood sugar level as directed  · Check your blood pressure as directed  High blood pressure can increase your risk for a stroke  If you have high blood pressure, follow your healthcare provider's directions for controlling your blood pressure  · Do not use nicotine products or illegal drugs  Nicotine and other chemicals in cigarettes and cigars can cause blood vessel damage  Nicotine and illegal drugs both increase your risk for a stroke  Ask your healthcare provider for information if you currently smoke or use drugs and need help to quit  E- cigarettes or smokeless tobacco still contain nicotine  Talk to your healthcare provider before you use these products  · Talk to your healthcare provider about alcohol  Alcohol can raise your blood pressure  The recommended limit is 2 drinks in a day for men and 1 drink in a day for women  Do not binge drink or save a week's worth of alcohol to drink in 1 or 2 days  Limit weekly amounts as directed by your provider  · Eat a variety of healthy foods  Healthy foods include whole-grain breads, low-fat dairy products, beans, lean meats, and fish  Eat at least 5 servings of fruits and vegetables each day  Choose foods that are low in fat, cholesterol, salt, and sugar  Eat foods that are high in potassium, such as potatoes and bananas  A dietitian can help you create healthy meal plans  · Maintain a healthy weight  Ask your healthcare provider how much you should weigh  Ask him or her to help you create a weight loss plan if you are overweight  He or she can help you create small goals if you have a lot of weight to lose  · Exercise as directed  Exercise can lower your blood pressure, cholesterol, weight, and blood sugar levels  Healthcare providers will help you create exercise goals  They can also help you make a plan to reach your goals  For example, you can break exercise into 10 minute periods, 3 times in the day  Find an exercise that you enjoy  This will make it easier for you to reach your exercise goals  · Manage stress  Stress can raise your blood pressure  Find ways to relax, such as deep breathing or listening to music  Follow up with your doctor or neurologist in 1 to 2 days:  Write down your questions so you remember to ask them during your visits  © Copyright [employer] 2021 Information is for End User's use only and may not be sold, redistributed or otherwise used for commercial purposes  All illustrations and images included in CareNotes® are the copyrighted property of [employer]  or [facility] Amira Castro   The above information is an  only  It is not intended as medical advice for individual conditions or treatments  Talk to your doctor, nurse or pharmacist before following any medical regimen to see if it is safe and effective for you

## 2021-07-16 NOTE — PHYSICAL THERAPY NOTE
PHYSICAL THERAPY EVALUATION    NAME:  Selin Amor  DATE: 21    AGE:   50 y o  Mrn:   [id number]  Length Of Stay: 0    ADMIT DX:  Facial droop [R29 810]  [facility] ) [I63 9]    Past Medical History:   Diagnosis Date    Disease of thyroid gland     Endometriosis     Sjogren's syndrome [facility] )      Past Surgical History:   Procedure Laterality Date     SECTION      WISDOM TOOTH EXTRACTION         Performed at least 2 patient identifiers during session: Name, Coker Ear and ID bracelet        21 0802   PT Last Visit   PT Visit Date 21   Note Type   Note type Evaluation   Pain Assessment   Pain Assessment Tool 0-10   Pain Score No Pain   Effect of Pain on Daily Activities n/a   Hospital Pain Intervention(s) Ambulation/increased activity;Repositioned   Multiple Pain Sites No   Home Living   Type of [address] Two level;Performs ADLs on one level; Able to live on main level with bedroom/bathroom; Laundry in basement  (0STE, FFSU (2nd floor is in law apartment))   Bathroom Shower/Tub Tub/shower unit   Bathroom Toilet Standard   Bathroom Equipment Grab bars in shower;Grab bars around toilet   [address]   (none)   Prior Function   Level of [place] Independent with ADLs and functional mobility  (independent with IADLs)   Lives With Family  (spouse and 2 children (6 and 15 yr old))   Receives Help From [facility] [address] in the last 6 months 0   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)   Comments PTA pt reports being completely independent with all ADL/IADL and functional mobility with no AD  +drives  enjoys baking, cooking, and spending time with her family  Restrictions/Precautions   Weight Bearing Precautions Per Order No   General   Additional Pertinent History Pt admitted under observation 7/15/2021 with L facial droop & dysarthria, (-) tpa   Dx: Stroke  CT: 1  Occlusion of a right MCA M2 branch  2  No stenosis, dissection or occlusion of the carotid or vertebral arteries  MRI pending  Family/Caregiver Present No   Cognition   Overall Cognitive Status WFL   Arousal/Participation Alert   Orientation Level Oriented X4   Memory Within functional limits   Following Commands Follows all commands and directions without difficulty   RUE Assessment   RUE Assessment WFL   LUE Assessment   LUE Assessment WFL   RLE Assessment   RLE Assessment WFL  (5/5 MMT throughout)   LLE Assessment   LLE Assessment WFL  (5/5 MMT throughout)   Coordination   Movements are Fluid and Coordinated 1   Sensation WFL   Light Touch   RLE Light Touch Grossly intact   LLE Light Touch Grossly intact   Proprioception   RLE Proprioception Grossly intact   LLE Proprioception Grossly Intact   Bed Mobility   Supine to Sit 7  Independent   Transfers   Sit to Stand 7  Independent   Stand to Sit 7  Independent   Stand pivot 7  Independent   Ambulation/Elevation   Gait pattern WNL  (gait speed = 0 42 m/s)   Gait Assistance 7  Independent   Assistive Device None   Distance 130ft x1 including stair negotiation half way   Stair Management Assistance 7  Independent   Stair Management Technique Foreward;Reciprocal;One rail R   Number of Stairs 14   Balance   Static Sitting Normal   Dynamic Sitting Normal   Static Standing Normal   Dynamic Standing Normal   Ambulatory Normal   Endurance Deficit   Endurance Deficit No   Activity Tolerance   Activity Tolerance Patient tolerated treatment well   Nurse Made Aware Spoke with SANDEEP Woodward   Assessment   Prognosis Excellent   Assessment Pt seen for PT evaluation, cleared by Shannan Veloz  Pt admitted 7/15/2021 with L facial droop and dysarthria, dx Stroke ([facility] )  Comorbidities affecting pt's fnxl performance include: hypothyroid, endometriosis, sjogren's syndrome, asthma   PTA, pt was independent with functional mobility without assistive device, independent with ADLS, independent with IADLS and living with family in a 1 level home with 0 steps to enter  Pt demonstrates fnxl impairments identified in objective findings from Gait Speed of 0 42m/s, indicating pt is safe for community mobility  Pt scores 24/24 on AM-PAC objective tool w/ a standardized score of 57 68, indicating pt demonstrates functional capacity for return to home self care vs with increased supports upon d/c  The Barthel Index outcome tool was used & pt scores 100 indicating no limitations of fnxl mobility/ADLS  Pt is at risk of falls d/t ongoing medical treatment of primary diagnosis  Pt's clinical presentation is currently evolving as further imaging and stroke work up pending  This PT IE requires modearte complexity clinical decision making, based on multiple medical/physiological/fnxl/social factors which affect pt's performance w/ evaluation & therapist judgement for disposition recommendations  Based on pt presentation & limited impairments, pt appears to be at her baseline level of functional mobility and does not require further skilled PT services in the acute care setting   Recommend return to home with family support upon d/c    Barriers to Discharge None   Goals   Patient Goals "to go home"   PT Treatment Day 0   Recommendation   PT Discharge Recommendation No rehabilitation needs   PT - OK to Discharge Yes  (once medically cleared)   AM-PAC Basic Mobility Inpatient   Turning in Bed Without Bedrails 4   Lying on Back to Sitting on Edge of Flat Bed 4   Moving Bed to Chair 4   Standing Up From Chair 4   Walk in Room 4   Climb 3-5 Stairs 4   Basic Mobility Inpatient Raw Score 24   Basic Mobility Standardized Score 57 68   Modified [place] Scale   Modified [place] Scale 0   Barthel Index   Feeding 10   Bathing 5   Grooming Score 5   Dressing Score 10   Bladder Score 10   Bowels Score 10   Toilet Use Score 10   Transfers (Bed/Chair) Score 15   Mobility (Level Surface) Score 15   Stairs Score 10   Barthel Index Score 100       The patient's AM-PAC Basic Mobility Inpatient Short Form Raw Score is 24, Standardized Score is 57 68  A standardized score greater than 42 9 suggests the patient may benefit from discharge to home  Please also refer to the recommendation of the Physical Therapist for safe discharge planning      Gait Speed Interpretation:  Gain of 0 1 m/s is a predictor of well-being in those w/ abnormal walking speed compared to age-patched peers  <0 7m/s is at an increased risk for falls  Household ambulator: <0 4 m/s  Limited community ambulator: 0 4-0 8 m/s  Community ambulator: 0 8-1 2 m/s  Able to safely cross streets: >1 2 m/s        Adeola Arana PT, DPT   Available via [employer]  [facility] # [id number]  [place] License - [id number]  5/06/5494

## 2021-07-16 NOTE — H&P
Tank  H&P- Stephanie Knife 1975, 50 y o  female MRN: [id number]  Unit/Bed#: MARCIA Encounter: [id number]  Primary Care Provider: Dickson Suazo MD   Date and time admitted to hospital: 7/15/2021 11:41 PM    * Stroke [facility])  Assessment & Plan   Presentation: Patient presents to ED due to complaints of left sided facial droop and dysarthria  Patient reports that around 2120 on 07/15/2021, she was calling to her 15year old daughter to practice her clarinet  Her daughter noticed that the patient's speech was slurred  The patient reports that she then experienced difficulty forming her words correctly  She states that around 2145 she laid down to put her younger daughter to bed and her daughter told the patient that the patient's face was slanted  The patient asked her older daughter who confirmed that she had a left sided facial droop  Patient presented to the ED for further evaluation  Patient reports mild headache and nausea upon arrival to ED  Patient was a stroke alert in the ED   CT: "Small, chronic infarct in the right MCA territory  Superimposed small acute or subacute infarct along the margins cannot be entirely excluded  No intracranial hemorrhage or mass effect     CTA:  "Occlusion of right MCA M2 branch  No stenosis, dissection or occlusion of the carotid or vertebral arteries     Stroke Pathway  o Frequent vital signs  o Neuro checks  o Telemetry  o Lipid panel from 06/24/2021: Total cholesterol 193, triglyceride 82, HDL 52,   o Check A1c   o Patient has allergy to aspirin  Patient was loaded with 600 mg of Plavix in ED   o Initiate statin   Thrombosis panel pending   Hold oral contraceptive   Obtain MRI brain   Obtain ECHO  [facility]   PT/OT consultation  Hypertensive urgency  Assessment & Plan  · Patient with no known diagnosis of hypertension; therefore, patient is not on any antihypertensives as an outpatient    · Present on admission, blood pressure of 235/101  Last recorded pressure: 151/82  Allow for permissive hypertension in the setting of CVA  Monitor blood pressure closely  Endometriosis  Assessment & Plan  · Prior to admission, patient was on norgestimate-ethinyl estradiol  Will hold in the setting of CVA  Sjogren's syndrome [facility])  Assessment & Plan  · Patient follows with rheumatology as an outpatient  · Plaquenil recently discontinued on 07/06/2021 due to adverse reaction of skin discoloration  Hypothyroidism due to Hashimoto's thyroiditis  Assessment & Plan  · Continue levothyroxine  Asthma  Assessment & Plan  · Not in acute exacerbation  · Home maintenance regimen of Advair, will replace with Breo while inpatient  · Continue Proventil p r n  Class 1 obesity in adult  Assessment & Plan  · Encouraged lifestyle modifications  VTE Pharmacologic Prophylaxis: VTE Score: 7 High Risk (Score >/= 5) - Pharmacological DVT Prophylaxis Ordered: enoxaparin (Lovenox)  Sequential Compression Devices Ordered  Code Status: Full per patient  Discussion with family: Updated  () at bedside  Anticipated Length of Stay: Patient will be admitted on an inpatient basis with an anticipated length of stay of greater than 2 midnights secondary to CVA workup  Total Time for Visit, including Counseling / Coordination of Care: 70 minutes Greater than 50% of this total time spent on direct patient counseling and coordination of care  Chief Complaint: Left sided facial droop and slurred speech    History of Present Illness:  Daniella Mendoza is a 50 y o  female with a PMH of Sjogren's, endometriosis on oral contraceptive, hypothyroidism who presents with left sided facial droop and slurred speech  Patient reports that around 2120 on 07/15/2021, she was calling to her 15year old daughter to practice her clarinet  Her daughter noticed that the patient's speech was slurred   The patient reports that she then experienced difficulty forming her words correctly  She states that around  she laid down to put her younger daughter to bed and her daughter told the patient that the patient's face was slanted  The patient asked her older daughter who confirmed that she had a left sided facial droop  Patient presented to the ED for further evaluation  Patient reports mild headache and nausea upon arrival to ED  Patient was a stroke alert in the ED  Patient's imaging displayed possible subacute-appearing right parietal infarct without clear evidence of early ischemic changes and possible occlusion of anterior right distal M2  Per neurology, patient was not a TPA candidate due to mild, non-disabling deficit  Additionally, patient was not a candidate for thrombectomy given current mild deficit and location of lesion  Patient has an allergy to aspirin  Patient was loaded with 600 mg of Plavix in ED  Patient will be admitted for CVA pathway  Review of Systems:  Review of Systems   Constitutional: Negative for chills and fever  Respiratory: Negative for chest tightness and shortness of breath  Cardiovascular: Negative for chest pain and leg swelling  Gastrointestinal: Positive for nausea  Negative for abdominal pain and vomiting  Skin: Positive for color change  Neurological: Positive for facial asymmetry, speech difficulty and headaches  Negative for dizziness, light-headedness and numbness  All other systems reviewed and are negative  Past Medical and Surgical History:   Past Medical History:   Diagnosis Date    Disease of thyroid gland     Endometriosis     Sjogren's syndrome ([facility] [address] )        Past Surgical History:   Procedure Laterality Date     SECTION      WISDOM TOOTH EXTRACTION         Meds/Allergies:  Prior to Admission medications    Medication Sig Start Date End Date Taking?  Authorizing Provider   ADVAIR -21 MCG/ACT inhaler Inhale 2 puffs 2 (two) times a day Rinse mouth after use 4/18/18  Yes Historical Provider, MD   loratadine (CLARITIN) 10 mg tablet Take 10 mg by mouth daily   Yes Historical Provider, MD   norgestimate-ethinyl estradiol (ORTHO TRI-CYCLEN,TRINESSA) 0 18/0 215/0 25 MG-35 MCG per tablet Take 1 tablet by mouth daily 6/24/20  Yes Alyssa Flood PA-C   Synthroid 112 MCG tablet Take 1 tab daily Monday through Saturday and 1 5 tab sunday 4/26/21  Yes Codi Nagel PA-C   albuterol (2 5 mg/3 mL) 0 083 % nebulizer solution Take 2 5 mg by nebulization every 6 (six) hours as needed for wheezing or shortness of breath    Historical Provider, MD   albuterol (2 5 mg/3 mL) 0 083 % nebulizer solution albuterol sulfate 2 5 mg/3 mL (0 083 %) solution for nebulization    Historical Provider, MD   albuterol (PROAIR HFA) 90 mcg/act inhaler ProAir HFA 90 mcg/actuation aerosol inhaler   USE 2 PUFFS EVERY 4 HOURS AS NEEDED  MAY USE 2 PUFFS 20-30 MINUTES BEFORE PHYSICAL EXERTION    Historical Provider, MD   albuterol (PROVENTIL HFA,VENTOLIN HFA) 90 mcg/act inhaler albuterol sulfate HFA 90 mcg/actuation aerosol inhaler   INHALE 2 PUFFS EVERY 4 HOURS AS NEEDED  MAY USE 2 PUFFS 20-30 MINUTES BEFORE PHYSICAL EXERTION    Historical Provider, MD   halobetasol (ULTRAVATE) 0 05 % cream halobetasol propionate 0 05 % topical cream   APPLY TWICE DAILY TO BACK AND LEGS  Patient not taking: Reported on 7/16/2021    Historical Provider, MD   hydrochlorothiazide (HYDRODIURIL) 25 mg tablet hydrochlorothiazide 25 mg tablet   TAKE 1 TABLET BY MOUTH EVERY DAY  Patient not taking: Reported on 7/16/2021    Historical Provider, MD   hydrochlorothiazide (HYDRODIURIL) 25 mg tablet Take 25 mg by mouth daily  Patient not taking: Reported on 7/16/2021 9/17/20   Historical Provider, MD   hydroxychloroquine (PLAQUENIL) 200 mg tablet Take 100 mg by mouth 2 (two) times a day with meals  Patient not taking: Reported on 7/16/2021    Historical Provider, MD   hydroxychloroquine (PLAQUENIL) 200 mg tablet Take 200 mg by mouth 2 (two) times a day  Patient not taking: Reported on 7/16/2021 9/12/20   Historical Provider, MD   ipratropium-albuterol (DUO-NEB) 0 5-2 5 mg/3 mL nebulizer solution ipratropium 0 5 mg-albuterol 3 mg (2 5 mg base)/3 mL nebulization soln  Patient not taking: Reported on 7/16/2021    Historical Provider, MD   mometasone-formoterol Brooke Luis) 200-5 MCG/ACT inhaler Dulera 200 mcg-5 mcg/actuation HFA aerosol inhaler   INHALE 2 PUFFS BY MOUTH EVERY 12 HOURS  Patient not taking: Reported on 6/30/2021    Historical Provider, MD   norgestimate-ethinyl estradiol (ORTHO TRI-CYCLEN,TRINESSA) 0 18/0 215/0 25 MG-35 MCG per tablet Tri-Estarylla (28) 0 18 mg(7)/0 215 mg(7)/0 25 mg(7)-35 mcg tablet    Historical Provider, MD   fluticasone-salmeterol (Advair HFA) 115-21 MCG/ACT inhaler  10/29/20 7/16/21  Historical Provider, MD     I have reviewed home medications with patient personally  Allergies:    Allergies   Allergen Reactions    Asa [Aspirin]     Avelox [Moxifloxacin]        Social History:  Marital Status: /Civil Union   Occupation: Unknown  Patient Pre-hospital Living Situation: Home  Patient Pre-hospital Level of Mobility: walks  Patient Pre-hospital Diet Restrictions: None  Substance Use History:   Social History     Substance and Sexual Activity   Alcohol Use Not Currently    Comment: social     Social History     Tobacco Use   Smoking Status Never Smoker   Smokeless Tobacco Never Used     Social History     Substance and Sexual Activity   Drug Use No       Family History:  Family History   Problem Relation Age of Onset    Heart disease Mother     Diabetes Mother     Hypertension Mother     Heart disease Father        Physical Exam:     Vitals:   Blood Pressure: 169/77 (07/16/21 0400)  Pulse: 74 (07/16/21 0400)  Temperature: 98 6 °F (37 °C) (07/15/21 2344)  Temp Source: Oral (07/15/21 2344)  Respirations: 16 (07/16/21 0330)  Height: 5' 1 5" (156 2 cm) (07/16/21 0018)  Weight - Scale: 81 7 kg (180 lb 1 9 oz) (07/16/21 0018)  SpO2: 97 % (07/16/21 0400)    Physical Exam  Vitals and nursing note reviewed  Constitutional:       General: She is not in acute distress  Appearance: Normal appearance  She is obese  She is not ill-appearing  HENT:      Head: Normocephalic and atraumatic  Nose: Nose normal    Eyes:      General: No visual field deficit or scleral icterus  Extraocular Movements: Extraocular movements intact  Conjunctiva/sclera: Conjunctivae normal       Pupils: Pupils are equal, round, and reactive to light  Cardiovascular:      Rate and Rhythm: Normal rate and regular rhythm  Pulses: Normal pulses  Heart sounds: Normal heart sounds  No murmur heard  Pulmonary:      Effort: Pulmonary effort is normal  No respiratory distress  Breath sounds: Normal breath sounds  No wheezing, rhonchi or rales  Chest:      Chest wall: No tenderness  Abdominal:      General: Bowel sounds are normal  There is no distension  Palpations: Abdomen is soft  Tenderness: There is no abdominal tenderness  Musculoskeletal:         General: No swelling  Normal range of motion  Cervical back: Normal range of motion  Skin:     General: Skin is warm and dry  Comments: Right forearm with hyperpigmentation noted  Hyperpigmentation of upper lip noted  Neurological:      General: No focal deficit present  Mental Status: She is alert and oriented to person, place, and time  GCS: GCS eye subscore is 4  GCS verbal subscore is 5  GCS motor subscore is 6  Cranial Nerves: No dysarthria or facial asymmetry  Motor: No weakness or pronator drift  Coordination: Coordination normal  Finger-Nose-Finger Test and Heel to RUST Test normal    Psychiatric:         Speech: Speech normal  Speech is not slurred           Additional Data:     Lab Results:  Results from last 7 days   Lab Units 07/16/21  0022   WBC Thousand/uL 6 05   HEMOGLOBIN g/dL 12 8   HEMATOCRIT % 39 9 PLATELETS Thousands/uL 292     Results from last 7 days   Lab Units 07/16/21  0022   SODIUM mmol/L 141   POTASSIUM mmol/L 3 9   CHLORIDE mmol/L 107   CO2 mmol/L 27   BUN mg/dL 19   CREATININE mg/dL 1 08   ANION GAP mmol/L 7   CALCIUM mg/dL 8 5   GLUCOSE RANDOM mg/dL 89     Results from last 7 days   Lab Units 07/16/21  0022   INR  0 94     Results from last 7 days   Lab Units 07/15/21  2354   POC GLUCOSE mg/dl 90               Imaging: Reviewed radiology reports from this admission including: CT head and CTA head/neck  CTA stroke alert (head/neck)   Final Result by Diana Arellano MD (07/16 0063)         1  Occlusion of a right MCA M2 branch  2   No stenosis, dissection or occlusion of the carotid or vertebral arteries  Findings were directly discussed with [facility] on 7/16/2021 1:07 AM                      Workstation performed: [id number]         CT stroke alert brain   Final Result by Diana Arellano MD (07/16 2515)         1  Small, chronic infarct in the right MCA territory  Superimposed small acute or subacute infarct along the margins cannot be entirely excluded  2   No intracranial hemorrhage or mass effect  Findings were directly discussed with [facility] on 7/16/2021 12:41 AM       Workstation performed: [id number]             EKG and Other Studies Reviewed on Admission:   · EKG: NSR  HR 81     ** Please Note: This note has been constructed using a voice recognition system   **

## 2021-07-16 NOTE — DISCHARGE INSTR - AVS FIRST PAGE
Dear Stephanie Flores,     It was our pleasure to care for you here at [facility], [address]  It is our hope that we were always able to exceed the expected standards for your care during your stay  You were hospitalized due to stroke like symptoms  You were cared for on the third floor by Stephie Mckeon MD under the service of Radha Donis MD with the [facility] who covers for your primary care physician (PCP), Ria Mccray MD, while you were hospitalized  If you have any questions or concerns related to this hospitalization, you may contact us at [phone]  For follow up as well as any medication refills, we recommend that you follow up with your primary care physician  A registered nurse will reach out to you by phone within a few days after your discharge to answer any additional questions that you may have after going home  However, at this time we provide for you here, the most important instructions / recommendations at discharge:     · Notable Medication Adjustments -   · Please take Lipitor 40 mg daily  · Please take Plavix 75 mg daily  · Please refrain from taking oral contraceptive pills  · Testing Required after Discharge -   · CECELIA to check for any structural cardiac abnormalities  · Important follow up information -   · Please follow-up with your PCP within 1 week  · Please follow-up at the neurology clinic within 4-6 weeks  · Other Instructions -   · If you experience any weakness, slurred speech, visual disturbance, facial droop, numbness or tingling, please report to the ER immediately  · Please review this entire after visit summary as additional general instructions including medication list, appointments, activity, diet, any pertinent wound care, and other additional recommendations from your care team that may be provided for you        Sincerely,     Stephie Mckeon MD

## 2021-07-16 NOTE — CASE MANAGEMENT
LOS: 1 DAY  PATIENT IS NOT A BUNDLE  PATIENT IS NOT A READMISSION  UNPLANNED RISK OF READMISSION: 10     Consult: Ischemic stroke    CM met with patient at bedside to introduce self, explain role, complete CM assessment, and discuss DC planning  Patient alert and oriented and sitting in recliner  Lives where: [place]  Lives with: spouse, two children (6and 15years old) mother in law lives upstairs in in law suite  Supports: spouse, family  Home Type & Entry: patient lives in a 2 story home but has a first floor set up  Her mother in law lives in the in-law suite upstairs  There are no MARTINA   DME: none   ADLs: self  VNA history:  none  STR history: none  Pharmacy Preference & Coverage[facility] [place]  Patient reports she has a high deductible with their insurance plan but the copays are affordable once it's met  Mental Health/Drug/ETOH history: none  POA/AD/LW: patient denies and is not interested in information  She identifies her spouse Baljinder Maher as HCR  Income/Employment: 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; she is to be starting work on 8/30  Transportation: patient does normally drives in the community  PCP: Dr Elizabeth Howell; she normally sees him every 3 to 4 months  Transport Home: spouse     DCP: PT/OT evaluated patient and she does not have any therapy needs at this time  She denies any other discharge needs  CM Dept will continue to follow  CM reviewed discharge planning process including the following: identifying caregivers at home, preference for d/c planning needs, availability of treatment team to discuss questions or concerns patient and/or family may have regarding diagnosis, plan of care, old or new medications and discharge planning   CM will continue to follow for care coordination and update assessment as appropriate

## 2021-07-16 NOTE — ASSESSMENT & PLAN NOTE
· Not in acute exacerbation  · Home maintenance regimen of Advair, will replace with Breo while inpatient  · Continue Proventil p r n

## 2021-07-16 NOTE — ASSESSMENT & PLAN NOTE
· Patient with no known diagnosis of hypertension; therefore, patient is not on any antihypertensives as an outpatient  · Present on admission, blood pressure of 235/101    Bps returned to 140s/80s without any antihypertensives    Plan:  - continue to monitor outpatient

## 2021-07-16 NOTE — PLAN OF CARE
Problem: PAIN - ADULT  Goal: Verbalizes/displays adequate comfort level or baseline comfort level  Description: Interventions:  - Encourage patient to monitor pain and request assistance  - Assess pain using appropriate pain scale  - Administer analgesics based on type and severity of pain and evaluate response  - Implement non-pharmacological measures as appropriate and evaluate response  - Consider cultural and social influences on pain and pain management  - Notify physician/advanced practitioner if interventions unsuccessful or patient reports new pain  Outcome: Progressing     Problem: INFECTION - ADULT  Goal: Absence or prevention of progression during hospitalization  Description: INTERVENTIONS:  - Assess and monitor for signs and symptoms of infection  - Monitor lab/diagnostic results  - Monitor all insertion sites, i e  indwelling lines, tubes, and drains  - Monitor endotracheal if appropriate and nasal secretions for changes in amount and color  - Irving appropriate cooling/warming therapies per order  - Administer medications as ordered  - Instruct and encourage patient and family to use good hand hygiene technique  - Identify and instruct in appropriate isolation precautions for identified infection/condition  Outcome: Progressing  Goal: Absence of fever/infection during neutropenic period  Description: INTERVENTIONS:  - Monitor WBC    Outcome: Progressing     Problem: SAFETY ADULT  Goal: Patient will remain free of falls  Description: INTERVENTIONS:  - Educate patient/family on patient safety including physical limitations  - Instruct patient to call for assistance with activity   - Consult OT/PT to assist with strengthening/mobility   - Keep Call bell within reach  - Keep bed low and locked with side rails adjusted as appropriate  - Keep care items and personal belongings within reach  - Initiate and maintain comfort rounds  - Make Fall Risk Sign visible to staff  - Apply yellow socks and bracelet for high fall risk patients  - Consider moving patient to room near nurses station  Outcome: Progressing  Goal: Maintain or return to baseline ADL function  Description: INTERVENTIONS:  -  Assess patient's ability to carry out ADLs; assess patient's baseline for ADL function and identify physical deficits which impact ability to perform ADLs (bathing, care of mouth/teeth, toileting, grooming, dressing, etc )  - Assess/evaluate cause of self-care deficits   - Assess range of motion  - Assess patient's mobility; develop plan if impaired  - Assess patient's need for assistive devices and provide as appropriate  - Encourage maximum independence but intervene and supervise when necessary  - Involve family in performance of ADLs  - Assess for home care needs following discharge   - Consider OT consult to assist with ADL evaluation and planning for discharge  - Provide patient education as appropriate  Outcome: Progressing  Goal: Maintains/Returns to pre admission functional level  Description: INTERVENTIONS:  - Perform BMAT or MOVE assessment daily    - Set and communicate daily mobility goal to care team and patient/family/caregiver     - Collaborate with rehabilitation services on mobility goals if consulted  - Out of bed for toileting  - Record patient progress and toleration of activity level   Outcome: Progressing     Problem: DISCHARGE PLANNING  Goal: Discharge to home or other facility with appropriate resources  Description: INTERVENTIONS:  - Identify barriers to discharge w/patient and caregiver  - Arrange for needed discharge resources and transportation as appropriate  - Identify discharge learning needs (meds, wound care, etc )  - Arrange for interpretive services to assist at discharge as needed  - Refer to Case Management Department for coordinating discharge planning if the patient needs post-hospital services based on physician/advanced practitioner order or complex needs related to functional status, cognitive ability, or social support system  Outcome: Progressing     Problem: Knowledge Deficit  Goal: Patient/family/caregiver demonstrates understanding of disease process, treatment plan, medications, and discharge instructions  Description: Complete learning assessment and assess knowledge base  Interventions:  - Provide teaching at level of understanding  - Provide teaching via preferred learning methods  Outcome: Progressing     Problem: NEUROSENSORY - ADULT  Goal: Achieves stable or improved neurological status  Description: INTERVENTIONS  - Monitor and report changes in neurological status  - Monitor vital signs such as temperature, blood pressure, glucose, and any other labs ordered   - Initiate measures to prevent increased intracranial pressure  - Monitor for seizure activity and implement precautions if appropriate      Outcome: Progressing  Goal: Remains free of injury related to seizures activity  Description: INTERVENTIONS  - Maintain airway, patient safety  and administer oxygen as ordered  - Monitor patient for seizure activity, document and report duration and description of seizure to physician/advanced practitioner  - If seizure occurs,  ensure patient safety during seizure  - Reorient patient post seizure  - Seizure pads on all 4 side rails  - Instruct patient/family to notify RN of any seizure activity including if an aura is experienced  - Instruct patient/family to call for assistance with activity based on nursing assessment  - Administer anti-seizure medications if ordered    Outcome: Progressing  Goal: Achieves maximal functionality and self care  Description: INTERVENTIONS  - Monitor swallowing and airway patency with patient fatigue and changes in neurological status  - Encourage and assist patient to increase activity and self care     - Encourage visually impaired, hearing impaired and aphasic patients to use assistive/communication devices  Outcome: Progressing

## 2021-07-16 NOTE — ASSESSMENT & PLAN NOTE
 Presentation: Patient presents to ED due to complaints of left sided facial droop and dysarthria  Patient reports that around 2120 on 07/15/2021, she was calling to her 15year old daughter to practice her clarinet  Her daughter noticed that the patient's speech was slurred  The patient reports that she then experienced difficulty forming her words correctly  She states that around 2145 she laid down to put her younger daughter to bed and her daughter told the patient that the patient's face was slanted  The patient asked her older daughter who confirmed that she had a left sided facial droop   CT: "Small, chronic infarct in the right MCA territory  Superimposed small acute or loaded with 600 mg Plavix subacute infarct along the margins cannot be entirely excluded  No intracranial hemorrhage or mass effect     CTA:  "Occlusion of right MCA M2 branch  No stenosis, dissection or occlusion of the carotid or vertebral arteries     MRI Brain: Chronic infarct right temporoparietal junction  No acute intracranial abnormality   Echo: LVEF 55%, mild calcification of mitral valve   Stroke Pathway  o Frequent vital signs  MRI brain  o Neuro checks  o Telemetry  No events, NSR, HR 63  New acute onset left-sided facial droop and dysarthria  o Lipid panel from 06/24/2021: Total cholesterol 193, triglyceride 82, HDL 52,   o A1c 5 2  o Patient has allergy to aspirin  Patient was loaded with 600 mg of Plavix in ED   o Initiate atorvastatin 40mg daily     Plan:  - Thrombosis panel pending  Will follow up outpatient neurology  - Hold oral contraceptive   Follow up with OBGYN for alternative non-estrogen therapy  - continue atorvastatin 40mg daily   - continue clopidogrel 75mg daily

## 2021-07-16 NOTE — SPEECH THERAPY NOTE
Speech Language/Pathology  Speech/Language Pathology  Screen    Patient Name: Stephanie Flores  [id number] Date: 7/16/2021       Consult received  Records reviewed  Pt admitted c symptoms concerning for CVA  Pt passed RN Dysphagia Assessment  Communication deficits denied  NIH score 1  MRI results: Chronic infarct right temporoparietal junction  No acute intracranial abnormality  No additional inpatient Speech Pathology evaluation appears indicated at this time  Please re-consult if additional concerns arise  Thank you

## 2021-07-16 NOTE — ED ATTENDING ATTESTATION
7/15/2021  IDiogo MD, saw and evaluated the patient  I have discussed the patient with the resident/non-physician practitioner and agree with the resident's/non-physician practitioner's findings, Plan of Care, and MDM as documented in the resident's/non-physician practitioner's note, except where noted  All available labs and Radiology studies were reviewed  I was present for key portions of any procedure(s) performed by the resident/non-physician practitioner and I was immediately available to provide assistance  At this point I agree with the current assessment done in the Emergency Department  I have conducted an independent evaluation of this patient a history and physical is as follows:    ED Course         Critical Care Time  Procedures    Patient is a 75-year-old female that reports to the emergency department with facial droop that started at around 9:00 p m  On 07/15  She noted that her children saw this facial droop  Some mild headache  No fevers, chills, sweats, dysuria  Headache was gradual in onset, no thunderclap, no neck stiffness or pain  Medical decision makin-year-old female, stroke alert called given facial droop, reported slurred speech, the facial droop is very mild on our exam but clearly present  Case discussed with Neurology, patient is not a tPA candidate, of note, given CT findings, will admit

## 2021-07-16 NOTE — ASSESSMENT & PLAN NOTE
 Presentation: Patient presents to ED due to complaints of left sided facial droop and dysarthria  Patient reports that around 2120 on 07/15/2021, she was calling to her 15year old daughter to practice her clarinet  Her daughter noticed that the patient's speech was slurred  The patient reports that she then experienced difficulty forming her words correctly  She states that around 2145 she laid down to put her younger daughter to bed and her daughter told the patient that the patient's face was slanted  The patient asked her older daughter who confirmed that she had a left sided facial droop  Patient presented to the ED for further evaluation  Patient reports mild headache and nausea upon arrival to ED  Patient was a stroke alert in the ED   CT: "Small, chronic infarct in the right MCA territory  Superimposed small acute or subacute infarct along the margins cannot be entirely excluded  No intracranial hemorrhage or mass effect     CTA:  "Occlusion of right MCA M2 branch  No stenosis, dissection or occlusion of the carotid or vertebral arteries     Stroke Pathway  o Frequent vital signs  o Neuro checks  o Telemetry  o Lipid panel from 06/24/2021: Total cholesterol 193, triglyceride 82, HDL 52,   o Check A1c   o Patient has allergy to aspirin  Patient was loaded with 600 mg of Plavix in ED   o Initiate statin   Thrombosis panel pending   Hold oral contraceptive   Obtain MRI brain   Obtain ECHO  [facility] Neurology   PT/OT consultation

## 2021-07-16 NOTE — CONSULTS
NEUROLOGY RESIDENCY CONSULT NOTE     Name: Morgan Lloyd   Age & Sex: 50 y o  female   MRN: [id number]  Unit/Bed#: S -01   Encounter: [id number]  Length of Stay: 0    ASSESSMENT & PLAN     * Stroke [facility])  Assessment & Plan  Patient is a 50 y o  female with history of Hashimoto's thyroiditis, history of 1 miscarraige, vitamin-D deficiency and Sjogren's who presented as stroke alert on 7/15/2021 11:41 PM with initial NIHSS of 1 and LKW 2100 who presents with who presents with dysarthria and left sided facial droop  Speech abnormalities resolved on arrival  Initial BP on arrival was Blood Pressure: (!) 235/101  As a result of mild nondisabling sx patient was determined to not be a candidate for tPA  F/u exam: WNL with exception of mild left sided facial droop  BP over last 24 hours: Blood Pressure: 148/86  current BP: Blood Pressure: 148/86     Work up:  HBA1C 5 2    Anticardiolipin antibody negative     Imaging:  CTH: small, chronic infarct in the right MCA territory  Superimposed small acute or subacute infarct along the margins cannot be entirely excluded  CTA:  occlusion of the right M2 branch  MRI: Chronic infarct right temporoparietal junction  No acute intracranial abnormality  Echocardiogram: EF 55%, normal atria     Impression: unclear etiology of stroke   Considerations include embolic source due to hypercoagulability 2/2 OCP use, underlying hypercoagulable state, unidentified cardiac arrhythmia vs vessel disease given history of hyperlipidemia vs hypertensive urgency    Plan:  Continue plavix 75 mg   Follow up with thrombosis panel results   Recommend scheduling outpatient CECELIA  If thrombosis panel and CECELIA negative, recommend loop recorder   Continue treatment with Lipitor 40 mg with goal LDL < 70  Stop OCP  Medical management as per primary team appreciated  Further workup can be done as outpatient  Stable for discharge with close follow up with stroke attending       [facility] Eve Portillo will need follow up in in 2 weeks with neurovascular attending   She will not require outpatient neurological testing  Schedule follow up appointment: This should be completed prior to removing patient from list or discharge     SUBJECTIVE     Reason for Consult / Principal Problem: L facial droop and slurred speech  Hx and PE limited by: None     HPI: Hang Duarte is a 50 y o  female with history of Hashimoto's thyroiditis, history of 1 miscarraige, vitamin-D deficiency and Sjogren's who presents with dysarthria and left sided facial droop  Last known well 2100  Daughter initially identified speech abnormalities with following L sided facial droop  Speech sx lasted approximately 1 hour and facial droop a few hours  Earlier day of presentation patient had a regular day filled with family activities  Denies any recent illness or stressors  On arrival to the ED /101, HR 84, RR 14 and 97% SpO2  Stroke alert called at [facility] 83  NIHSS of 1 given for mild facial palsy  Speech was noted to be at baseline  CTH showed a "small, chronic infarct in the right MCA territory  Superimposed small acute or subacute infarct along the margins cannot be entirely excluded " CTA showed occlusion of the right M2 branch  Patient not given tPA due to mild, non disabling deficits  Patient also not a candidate for thrombectomy for this reason as well as for the location of the lesion  It was recommended that patient receive aspirin 325 and 600 mg of Plavix with admission to the stroke pathway  Oral OCP held  Patient not given aspirin as she is allergic     Recent LDL at [facility] on 6/24/21 123  Patient does not seem to be on a statin medication at home  No report of being on antiplatelet or anticoagulation at home either  She has a history of one miscarriage more than 12 years ago  She reports following regularly at doctors visits and never having problems with her blood pressure   Denies history of DVTs Inpatient consult to Neurology     Performed by  Jorje Ramirez MD     Authorized by DAIN Donovan              Historical Information   Past Medical History:   Diagnosis Date    Disease of thyroid gland     Endometriosis     Sjogren's syndrome ([address] )      Past Surgical History:   Procedure Laterality Date     SECTION      WISDOM TOOTH EXTRACTION       Social History   Social History     Substance and Sexual Activity   Alcohol Use Not Currently    Comment: social     Social History     Substance and Sexual Activity   Drug Use No     E-Cigarette/Vaping    E-Cigarette Use Never User      E-Cigarette/Vaping Substances     Social History     Tobacco Use   Smoking Status Never Smoker   Smokeless Tobacco Never Used     Family History: non-contributory  Meds/Allergies   all current active meds have been reviewed  Allergies   Allergen Reactions    Asa [Aspirin]     Avelox [Moxifloxacin]        Review of previous medical records was  completed  Review of Systems   Constitutional: Negative for fever  Eyes: Negative for visual disturbance  Genitourinary: Positive for menstrual problem  Musculoskeletal: Negative for gait problem  Neurological: Positive for facial asymmetry and speech difficulty  Negative for syncope, weakness and headaches  Psychiatric/Behavioral: The patient is nervous/anxious  OBJECTIVE     Patient ID: Royal Sheikh is a 50 y o  female  Vitals:   Vitals:    21 0715 21 0915 21 1300 21 1500   BP: (!) 177/88 168/89 141/80 148/86   BP Location: Right arm Right arm Right arm Right arm   Pulse: 77 70 69 70   Resp: 16 18 18 18   Temp: 97 7 °F (36 5 °C) 98 1 °F (36 7 °C)  97 6 °F (36 4 °C)   TempSrc: Oral Oral  Oral   SpO2: 96% 98%  97%   Weight:       Height:          Body mass index is 33 48 kg/m²       Intake/Output Summary (Last 24 hours) at 2021 1639  Last data filed at 2021 1300  Gross per 24 hour   Intake 480 ml   Output --   Net 480 ml       Temperature:   Temp (24hrs), Av 1 °F (36 7 °C), Min:97 6 °F (36 4 °C), Max:98 6 °F (37 °C)    Temperature: 97 6 °F (36 4 °C)    Invasive Devices: Invasive Devices     Peripheral Intravenous Line            Peripheral IV 07/15/21 Left Antecubital <1 day                Physical Exam  Neurological:      Mental Status: She is oriented to person, place, and time  Deep Tendon Reflexes:      Reflex Scores:       Bicep reflexes are 2+ on the right side and 2+ on the left side  Brachioradialis reflexes are 2+ on the right side and 2+ on the left side  Patellar reflexes are 2+ on the right side and 2+ on the left side  Neurologic Exam     Mental Status   Oriented to person, place, and time  Cranial Nerves   Cranial nerves II through XII intact  With exception of very mild facial asymmetry      Motor Exam     Strength   Right deltoid: 5/5  Left deltoid: 5/5  Right biceps: 5/5  Left biceps: 5/5  Right triceps: 5/5  Left triceps: 5/5  Right iliopsoas: 5/5  Left iliopsoas: 5/5  Right quadriceps: 5/5  Left quadriceps: 5/5  Right hamstrin/5  Left hamstrin/5  Right anterior tibial: 5/5  Left anterior tibial: 5/5  Right gastroc: 5/5  Left gastroc: 5/5    Sensory Exam   Light touch normal      Gait, Coordination, and Reflexes     Tremor   Resting tremor: absent    Reflexes   Right brachioradialis: 2+  Left brachioradialis: 2+  Right biceps: 2+  Left biceps: 2+  Right patellar: 2+  Left patellar: 2+  Right : 2+  Left : 2+         LABORATORY DATA     Labs: I have personally reviewed pertinent reports      Results from last 7 days   Lab Units 21  0022   WBC Thousand/uL 6 05   HEMOGLOBIN g/dL 12 8   HEMATOCRIT % 39 9   PLATELETS Thousands/uL 292      Results from last 7 days   Lab Units 21  0022   POTASSIUM mmol/L 3 9   CHLORIDE mmol/L 107   CO2 mmol/L 27   BUN mg/dL 19   CREATININE mg/dL 1 08   CALCIUM mg/dL 8 5              Results from last 7 days Lab Units 07/16/21  0022   INR  0 94   PTT seconds 27         Results from last 7 days   Lab Units 07/16/21  0022   TROPONIN I ng/mL <0 02       IMAGING & DIAGNOSTIC TESTING     Radiology Results: I have personally reviewed pertinent reports  MRI brain w wo contrast   Final Result by [facility], DO (07/16 1031)      Chronic infarct right temporoparietal junction  No acute intracranial abnormality  Workstation performed: [id number]         CTA stroke alert (head/neck)   Final Result by Zaria Steinberg MD (07/16 7433)         1  Occlusion of a right MCA M2 branch  2   No stenosis, dissection or occlusion of the carotid or vertebral arteries  Findings were directly discussed with [facility] on 7/16/2021 1:07 AM                      Workstation performed: [id number]         CT stroke alert brain   Final Result by Zaria Steinberg MD (07/16 3071)         1  Small, chronic infarct in the right MCA territory  Superimposed small acute or subacute infarct along the margins cannot be entirely excluded  2   No intracranial hemorrhage or mass effect  Findings were directly discussed with [facility] on 7/16/2021 12:41 AM       Workstation performed: [id number]             Other Diagnostic Testing: I have personally reviewed pertinent reports        ACTIVE MEDICATIONS     Current Facility-Administered Medications   Medication Dose Route Frequency    acetaminophen (TYLENOL) tablet 650 mg  650 mg Oral Q6H PRN    albuterol (PROVENTIL HFA,VENTOLIN HFA) inhaler 2 puff  2 puff Inhalation Q6H PRN    atorvastatin (LIPITOR) tablet 40 mg  40 mg Oral QPM    clopidogrel (PLAVIX) tablet 75 mg  75 mg Oral Daily    enoxaparin (LOVENOX) subcutaneous injection 40 mg  40 mg Subcutaneous Daily    fluticasone-vilanterol (BREO ELLIPTA) 200-25 MCG/INH inhaler 1 puff  1 puff Inhalation Daily    levothyroxine tablet 112 mcg  112 mcg Oral Once per day on Mon Tue Wed Thu Fri Sat    [START ON 7/18/2021] levothyroxine tablet 168 mcg  168 mcg Oral Once per day on Sun    loratadine (CLARITIN) tablet 10 mg  10 mg Oral Daily       Prior to Admission medications    Medication Sig Start Date End Date Taking?  Authorizing Provider   [facility] MCG/ACT inhaler Inhale 2 puffs 2 (two) times a day Rinse mouth after use 4/18/18  Yes Historical Provider, MD   loratadine (CLARITIN) 10 mg tablet Take 10 mg by mouth daily   Yes Historical Provider, MD   norgestimate-ethinyl estradiol (ORTHO TRI-CYCLEN,TRINESSA) 0 18/0 215/0 25 MG-35 MCG per tablet Take 1 tablet by mouth daily 6/24/20  Yes Colton Mora PA-C   Synthroid 112 MCG tablet Take 1 tab daily Monday through Saturday and 1 5 tab sunday 4/26/21  Yes Codi Nagel PA-C   albuterol (2 5 mg/3 mL) 0 083 % nebulizer solution Take 2 5 mg by nebulization every 6 (six) hours as needed for wheezing or shortness of breath    Historical Provider, MD   albuterol (2 5 mg/3 mL) 0 083 % nebulizer solution albuterol sulfate 2 5 mg/3 mL (0 083 %) solution for nebulization    Historical Provider, MD   albuterol (PROAIR HFA) 90 mcg/act inhaler ProAir HFA 90 mcg/actuation aerosol inhaler   USE 2 PUFFS EVERY 4 HOURS AS NEEDED  MAY USE 2 PUFFS 20-30 MINUTES BEFORE PHYSICAL EXERTION    Historical Provider, MD   albuterol (PROVENTIL HFA,VENTOLIN HFA) 90 mcg/act inhaler albuterol sulfate HFA 90 mcg/actuation aerosol inhaler   INHALE 2 PUFFS EVERY 4 HOURS AS NEEDED  MAY USE 2 PUFFS 20-30 MINUTES BEFORE PHYSICAL EXERTION    Historical Provider, MD   halobetasol (ULTRAVATE) 0 05 % cream halobetasol propionate 0 05 % topical cream   APPLY TWICE DAILY TO BACK AND LEGS  Patient not taking: Reported on 7/16/2021    Historical Provider, MD   hydrochlorothiazide (HYDRODIURIL) 25 mg tablet hydrochlorothiazide 25 mg tablet   TAKE 1 TABLET BY MOUTH EVERY DAY  Patient not taking: Reported on 7/16/2021    Historical Provider, MD   hydrochlorothiazide (HYDRODIURIL) 25 mg tablet Take 25 mg by mouth daily  Patient not taking: Reported on 7/16/2021 9/17/20   Historical Provider, MD   hydroxychloroquine (PLAQUENIL) 200 mg tablet Take 100 mg by mouth 2 (two) times a day with meals  Patient not taking: Reported on 7/16/2021    Historical Provider, MD   hydroxychloroquine (PLAQUENIL) 200 mg tablet Take 200 mg by mouth 2 (two) times a day  Patient not taking: Reported on 7/16/2021 9/12/20   Historical Provider, MD   ipratropium-albuterol (DUO-NEB) 0 5-2 5 mg/3 mL nebulizer solution ipratropium 0 5 mg-albuterol 3 mg (2 5 mg base)/3 mL nebulization soln  Patient not taking: Reported on 7/16/2021    Historical Provider, MD   mometasone-formoterol Deoian Crigler) 200-5 MCG/ACT inhaler Dulera 200 mcg-5 mcg/actuation HFA aerosol inhaler   INHALE 2 PUFFS BY MOUTH EVERY 12 HOURS  Patient not taking: Reported on 6/30/2021    Historical Provider, MD   norgestimate-ethinyl estradiol (ORTHO TRI-CYCLEN,TRINESSA) 0 18/0 215/0 25 MG-35 MCG per tablet Tri-Estarylla (28) 0 18 mg(7)/0 215 mg(7)/0 25 mg(7)-35 mcg tablet    Historical Provider, MD   fluticasone-salmeterol (Advair HFA) 115-21 MCG/ACT inhaler  10/29/20 7/16/21  Historical Provider, MD         CODE STATUS & ADVANCED DIRECTIVES     Code Status: Level 1 - Full Code  Advance Directive and Living Will:      Power of :    POLST:        VTE Pharmacologic Prophylaxis: Enoxaparin (Lovenox)  VTE Mechanical Prophylaxis: sequential compression device    ==  MD Abdoulaye Sparks 71 Neurology Residency, PGY-3

## 2021-07-16 NOTE — ASSESSMENT & PLAN NOTE
· Prior to admission, patient was on norgestimate-ethinyl estradiol  Will hold in the setting of CVA       Plan:  - outpatient OBGYN follow up for alternative, non-estrogen containing therapy options

## 2021-07-17 NOTE — UTILIZATION REVIEW
Initial Clinical Review    Admission: Date/Time/Statement:   Admission Orders (From admission, onward)     Ordered        07/16/21 0209  Inpatient Admission  Once                   Orders Placed This Encounter   Procedures    Inpatient Admission     Standing Status:   Standing     Number of Occurrences:   1     Order Specific Question:   Level of Care     Answer:   Med Surg [16]     Order Specific Question:   Estimated length of stay     Answer:   More than 2 Midnights     Order Specific Question:   Certification     Answer:   I certify that inpatient services are medically necessary for this patient for a duration of greater than two midnights  See H&P and MD Progress Notes for additional information about the patient's course of treatment  ED Arrival Information     Expected Arrival Acuity    - 7/15/2021 23:38 Emergent         Means of arrival Escorted by Service Admission type    [facility] Emergency         Arrival complaint    SLURRED SPEECH/FACIAL DROOP        Chief Complaint   Patient presents with    Facial Droop     Pt presents to the ED with c/o left sided facial droop and slurred speech since 2130  Pt reports speech is normal now  Initial Presentation: 50  Y O female presents to ED from home  With left sided facial droop and slurred speech followed by difficulty forming words correctly the evening prior to admission  Daughter noticed patient's face was slanted  Had mild headache and nausea  Imaging showed  possible subacute-appearing right parietal infarct without clear evidence of early ischemic changes and possible occlusion of anterior right distal M2  Not a  TPA  Candidate per neuro due to mild, non disabling deficit  Not a  Candidate for thrombectomy given mild deficit and location of lesion  Loaded with plavix in ED  PMH  Is  Hypothyroidism, endometriosis on OC, asthma  and  Sjogren's   BP on arrival   235/101   Admit  Ip with  Stroke, hypertensive urgency and plan is   Tele, neuro consult, PT/OT, MRI brain, 2 DE, frequent vital signs, neuro checks  And  Close  BP monitoring  Neuro consult  ( 7/16)    Mri brain negative for acute cva however chronic left temperoparietal cva noted and cta showing likely chronic rt m2 branch occlusion leading to this stroke  Unclear when this may have occurred   No prior  Neurological issues  Unclear etiology  Of  BP  Admits to some stressors, unclear if stress reaction  Or  Hypertensive urgency  No additional neuro  Testing required  Unlikely acute stroke        ED Triage Vitals   Temperature Pulse Respirations Blood Pressure SpO2   07/15/21 2344 07/15/21 2344 07/15/21 2344 07/15/21 2344 07/15/21 2344   98 6 °F (37 °C) 84 14 (!) 235/101 97 %      Temp Source Heart Rate Source Patient Position - Orthostatic VS BP Location FiO2 (%)   07/15/21 2344 07/15/21 2344 07/16/21 0000 07/15/21 2344 --   Oral Monitor Lying Right arm       Pain Score       07/15/21 2344       No Pain          Wt Readings from Last 1 Encounters:   07/16/21 81 7 kg (180 lb 1 9 oz)     Additional Vital Signs:   07/16/21 0400  --  74  --  169/77  110  97 %  --  --   07/16/21 0345  --  70  --  165/74  106  98 %  --  --   07/16/21 0330  --  76  16  163/80  --  96 %  --  --   07/16/21 0315  --  82  16  161/76  --  97 %  --  Lying   07/16/21 0300  --  74  --  141/64  --  97 %  --  --   07/16/21 0245  --  68  --  147/71  --  98 %  --  --   07/16/21 0230  --  78  16  142/64  --  98 %  --  Lying   07/16/21 0215  --  75  16  148/80  --  98 %  --  --   07/16/21 0210  --  72  --  --  --  98 %  --  --   07/16/21 0205  --  74  --  --  --  98 %  --  --   07/16/21 02:04:40  --  --  --  151/82  --  --  --  --   07/16/21 0200  --  72  16  151/82  --  98 %  --  Lying   07/16/21 0155  --  84  --  --  --  92 %  --  --   07/16/21 0150  --  76  --  --  --  98 %  --  --   07/16/21 01:45:03  --  --  --  169/81  --  --  --  --   07/16/21 0145  --  74  16  169/81  --  97 %  --  Lying   07/16/21 0140  --  72  --  --  --  97 %  --  --   07/16/21 0135  --  76  --  --  --  99 %  --  --   07/16/21 01:30:03  --  --  --  172/82Abnormal   --  --  --  --   07/16/21 0130  --  75  16  172/82Abnormal   --  96 %  --  Lying   07/16/21 0125  --  84  --  --  --  98 %  --  --   07/16/21 0120  --  76  --  --  --  98 %  --  --   07/16/21 01:15:03  --  --  --  164/76  --  --  --  --   07/16/21 0115  --  76  16  164/76  --  96 %  --  Lying   07/16/21 0110  --  80  --  --  --  99 %  --  --   07/16/21 0105  --  76  --  --  --  100 %  --  --   07/16/21 01:00:03  --  --  --  186/85Abnormal   --  --  --  --   07/16/21 0100  --  86  --  --  --  99 %  --  --   07/16/21 0045  --  87  16  167/81  --  98 %  --  Lying   07/16/21 0030  --  88  16  135/77  --  97 %  --  Lying   07/16/21 0025  --  84  --  --  --  96 %  --  --   07/16/21 0020  --  80  --  --  --  98 %  --  --   07/16/21 0017  --  --  --  --  --  --  None (Room air)  --   07/16/21 0015  --  84  --  193/68Abnormal   119  99 %  --  --   07/16/21 0000  --  80  16  203/90Abnormal   129  98 %  None (Room air)  Lying   07/15/21 2344  98 6 °F (37 °C)  84  14  235/101Abnormal   --  97 %  None (Room air)         Pertinent Labs/Diagnostic Test Results:   MRI  Brain ( 7/16)    Chronic infarct right temporoparietal junction   No acute intracranial abnormality      CTA  Head/neck  ( 7/16)    Occlusion of a right MCA M2 branch  2   No stenosis, dissection or occlusion of the carotid or vertebral arteries  Ct  Brain ( 7/16)     Small, chronic infarct in the right MCA territory   Superimposed small acute or subacute infarct along the margins cannot be entirely excluded  2   No intracranial hemorrhage or mass effect     Results from last 7 days   Lab Units 07/16/21  0022   SARS-COV-2  Negative     Results from last 7 days   Lab Units 07/16/21  0022   WBC Thousand/uL 6 05   HEMOGLOBIN g/dL 12 8   HEMATOCRIT % 39 9   PLATELETS Thousands/uL 292         Results from last 7 days   Lab Units 07/16/21  0022   SODIUM mmol/L 141   POTASSIUM mmol/L 3 9   CHLORIDE mmol/L 107   CO2 mmol/L 27   ANION GAP mmol/L 7   BUN mg/dL 19   CREATININE mg/dL 1 08   EGFR ml/min/1 73sq m 61   CALCIUM mg/dL 8 5         Results from last 7 days   Lab Units 07/15/21  2354   POC GLUCOSE mg/dl 90     Results from last 7 days   Lab Units 07/16/21  0022   GLUCOSE RANDOM mg/dL 89         Results from last 7 days   Lab Units 07/16/21  0022   HEMOGLOBIN A1C % 5 2   EAG mg/dl 103       Results from last 7 days   Lab Units 07/16/21  0022   TROPONIN I ng/mL <0 02         Results from last 7 days   Lab Units 07/16/21  0022   PROTIME seconds 12 7   INR  0 94   PTT seconds 27             ED Treatment:   Medication Administration from 07/15/2021 2337 to 07/16/2021 6549       Date/Time Order Dose Route Action Comments     07/16/2021 0056 iohexol (OMNIPAQUE) 350 MG/ML injection (SINGLE-DOSE) 100 mL 100 mL Intravenous Given      07/16/2021 0104 clopidogrel (PLAVIX) tablet 600 mg 600 mg Oral Given         Present on Admission:   Hypothyroidism due to Hashimoto's thyroiditis      Admitting Diagnosis: Facial droop [R29 810]  Stroke ([facility] [address] ) [I63 9]  Age/Sex: 50 y o  female  Admission Orders:  Scheduled Medications:  plavix daily  SQ lovenox daily  claritin daily  breo ellipta  Daily  Synthroid daily      Continuous IV Infusions:        PRN Meds:      IP CONSULT TO NEUROLOGY  IP CONSULT TO CASE MANAGEMENT  IP CONSULT TO NUTRITION SERVICES    Network Utilization Review Department  ATTENTION: Please call with any questions or concerns to [phone] and carefully listen to the prompts so that you are directed to the right person  All voicemails are confidential   Cloyde Havers all requests for admission clinical reviews, approved or denied determinations and any other requests to dedicated fax number below belonging to the campus where the patient is receiving treatment   List of dedicated fax numbers for the Facilities:  [facility] ADMISSION DENIALS (Administrative/Medical Necessity) [phone]   [address] (Maternity/NICU/Pediatrics) [address] [facility] [address] [address] [place] [address] Jose Cardona [address] [facility] [address] [facility] [phone]

## 2021-07-18 LAB
APTT SCREEN TO CONFIRM RATIO: 1.12 RATIO (ref 0–1.4)
CONFIRM APTT/NORMAL: 34.8 SEC (ref 0–55)
LA PPP-IMP: NORMAL
SCREEN APTT: 33.1 SEC (ref 0–51.9)
SCREEN DRVVT: 31.8 SEC (ref 0–47)
THROMBIN TIME: 17.8 SEC (ref 0–23)

## 2021-07-19 LAB
PROT C AG ACT/NOR PPP IA: 98 % OF NORMAL (ref 60–150)
PROT S ACT/NOR PPP: 59 % (ref 57–157)
PROT S ACT/NOR PPP: 59 % (ref 68–108)
PROT S PPP-ACNC: 47 % (ref 60–150)

## 2021-07-20 NOTE — UTILIZATION REVIEW
Notification of Discharge   This is a Notification of Discharge from our facility [address]  Please be advised that this patient has been discharge from our facility  Below you will find the admission and discharge date and time including the patients disposition  UTILIZATION REVIEW CONTACT:  Zaira Gene  Utilization   Network Utilization Review Department  Phone: [phone] x carefully listen to the prompts  All voicemails are confidential   Email: [email or web address]     PHYSICIAN ADVISORY SERVICES:  FOR [id number] REVIEW - MEDICAL NECESSITY DENIAL  Phone: [phone]  Fax: [phone]  Email: [email or web address]     PRESENTATION DATE: 7/15/2021 11:41 PM  OBERVATION ADMISSION DATE:   INPATIENT ADMISSION DATE: 7/16/21  2:09 AM   DISCHARGE DATE: 7/16/2021  6:17 PM  DISPOSITION: Home/Self Care Home/Self Care      IMPORTANT INFORMATION:  Send all requests for admission clinical reviews, approved or denied determinations and any other requests to dedicated fax number below belonging to the campus where the patient is receiving treatment   List of dedicated fax numbers:  [address] [place] [address] DENIALS (Administrative/Medical Necessity) [phone]   [address]  (Maternity/NICU/Pediatrics) [phone]   Adrian Bennett [phone]   Radha Lerma [phone]   Rosa Bui [phone]   [address] [phone]   [facility]  [phone]   [address] [facility] [address]  [address] [facility] [address] [facility] [phone]

## 2021-07-21 ENCOUNTER — TELEPHONE (OUTPATIENT)
Dept: NEUROLOGY | Facility: CLINIC | Age: 46
End: 2021-07-21

## 2021-07-21 LAB
F2 GENE MUT ANL BLD/T: NORMAL
F5 GENE MUT ANL BLD/T: NORMAL

## 2021-07-21 NOTE — TELEPHONE ENCOUNTER
1st Attempt  Spoke with patient to schedule her hospital follow up  Patient is scheduled for 09/10 with Dr Adelita Dallas in the [place] office  Added to waitlist for sooner appointment  [employer]/Stroke/[employer]    Notes from chart:  Celi Jenkins will need follow up in in 2 weeks with neurovascular attending  She will not require outpatient neurological testing

## 2021-07-22 ENCOUNTER — TELEPHONE (OUTPATIENT)
Dept: NEUROLOGY | Facility: CLINIC | Age: 46
End: 2021-07-22

## 2021-07-22 NOTE — TELEPHONE ENCOUNTER
Post CVA Discharge Follow Up    Hospitalization: 7/15/2021 - 7/16/2021  The purpose of this phone call is to assess patient's general wellbeing or for any assistance needed with follow-up care  Called, reached patient, I introduced myself and explained neurovascular nurse navigator role  Since discharge, she denies experiencing any new or worsening stroke-like symptoms  Patient denies facial droop and her speech is clear and fluent over the phone  States she continues to have dull headaches at times  Ambulation / ADLs:  she is ambulating independently as well as preforming her own ADLs  Patient manages her own medications, appointments, and affairs  Appointments / Medication Review:  Reviewed appointments - patient successfully followed up with PCP  Scheduled with Dr Adelita Dallas 9/10  Patient on wait list      I reviewed medications with her  Amlodipine 5 mg once daily  Reports having no difficulties obtaining medications  Reports she is taking all as prescribed with no missed doses, medication side effects, or signs of bleeding  Risk Factors / Education:  During this call, we reviewed stroke type, symptoms, personal risk factors and management, medications, and resources  Patient verbalizes understanding  Offered to send stroke education binder and my card in the mail, they are agreeable to this  I mailed the information as requested  As for risk factors, BP is monitored at home  Reported average BP continues to be 129/92  she is a non smoker  I addressed all her questions  At the conclusion of the conversation, patient denies having any further questions or concerns

## 2021-07-23 ENCOUNTER — HOSPITAL ENCOUNTER (OUTPATIENT)
Dept: RADIOLOGY | Facility: MEDICAL CENTER | Age: 46
Discharge: HOME | End: 2021-07-23
Payer: COMMERCIAL

## 2021-07-23 VITALS — WEIGHT: 180 LBS | HEIGHT: 62 IN | BODY MASS INDEX: 33.13 KG/M2

## 2021-07-23 DIAGNOSIS — Z12.31 ENCOUNTER FOR SCREENING MAMMOGRAM FOR BREAST CANCER: ICD-10-CM

## 2021-07-23 PROCEDURE — 77063 BREAST TOMOSYNTHESIS BI: CPT

## 2021-07-23 PROCEDURE — 77067 SCR MAMMO BI INCL CAD: CPT

## 2021-07-27 ENCOUNTER — TELEPHONE (OUTPATIENT)
Dept: SURGICAL ONCOLOGY | Facility: CLINIC | Age: 46
End: 2021-07-27

## 2021-07-27 NOTE — TELEPHONE ENCOUNTER
New Patient Encounter    New Patient Intake Form   Patient Details:  Madonna Morrell  1975  [id number]    Background Information:  [facility] starts by opening a telephone encounter and gathering the following information   Who is calling to schedule? If not self, relationship to patient? Patient   Referring Provider Hospital discharge   What is the diagnosis? Protein S def  Is this Cancer or Non-Cancer? Non-Cancer   Is this diagnosis confirmed? Yes   When was the diagnosis? 7/2021   Is there a confirmed diagnosis from a biopsy/tissue reviewed by pathology? No   Were outside slides requested? No   Is patient aware of diagnosis? Yes   Is there a personal history and what kind? No   Is there a family history and what kind? No   Reason for visit? New Diagnosis   Have you had any imaging or labs done? If so: when, where? yes  sl   Are records in [employer]? yes   If patient has a prior history of cancer were old records obtained? No   Was the patient told to bring a disk? No   Does the patient smoke or Vape? No   If yes, how many packs or cartridges per day? Scheduling Information:   Preferred [place]: [place]     Are there any dates/time the patient cannot be seen? Miscellaneous:    After completing the above information, please route to Financial Counselor and the appropriate Nurse Navigator for review

## 2021-08-02 ENCOUNTER — ANESTHESIA EVENT (OUTPATIENT)
Dept: NON INVASIVE DIAGNOSTICS | Facility: HOSPITAL | Age: 46
End: 2021-08-02

## 2021-08-02 ENCOUNTER — HOSPITAL ENCOUNTER (OUTPATIENT)
Dept: NON INVASIVE DIAGNOSTICS | Facility: HOSPITAL | Age: 46
Discharge: HOME | End: 2021-08-02
Admitting: INTERNAL MEDICINE
Payer: COMMERCIAL

## 2021-08-02 ENCOUNTER — ANESTHESIA (OUTPATIENT)
Dept: NON INVASIVE DIAGNOSTICS | Facility: HOSPITAL | Age: 46
End: 2021-08-02

## 2021-08-02 VITALS
WEIGHT: 189 LBS | DIASTOLIC BLOOD PRESSURE: 79 MMHG | TEMPERATURE: 97.3 F | OXYGEN SATURATION: 99 % | BODY MASS INDEX: 34.78 KG/M2 | RESPIRATION RATE: 17 BRPM | HEART RATE: 76 BPM | HEIGHT: 62 IN | SYSTOLIC BLOOD PRESSURE: 135 MMHG

## 2021-08-02 DIAGNOSIS — I63.9 STROKE (HCC): ICD-10-CM

## 2021-08-02 LAB
ANION GAP SERPL CALCULATED.3IONS-SCNC: 7 MMOL/L (ref 4–13)
BUN SERPL-MCNC: 13 MG/DL (ref 5–25)
CALCIUM SERPL-MCNC: 8.5 MG/DL (ref 8.3–10.1)
CHLORIDE SERPL-SCNC: 105 MMOL/L (ref 100–108)
CO2 SERPL-SCNC: 29 MMOL/L (ref 21–32)
CREAT SERPL-MCNC: 0.87 MG/DL (ref 0.6–1.3)
ERYTHROCYTE [DISTWIDTH] IN BLOOD BY AUTOMATED COUNT: 13.2 % (ref 11.6–15.1)
EXT PREGNANCY TEST URINE: NEGATIVE
EXT. CONTROL: NORMAL
GFR SERPL CREATININE-BSD FRML MDRD: 80 ML/MIN/1.73SQ M
GLUCOSE P FAST SERPL-MCNC: 81 MG/DL (ref 65–99)
GLUCOSE SERPL-MCNC: 81 MG/DL (ref 65–140)
HCT VFR BLD AUTO: 40.3 % (ref 34.8–46.1)
HGB BLD-MCNC: 12.8 G/DL (ref 11.5–15.4)
INR PPP: 0.9 (ref 0.84–1.19)
MCH RBC QN AUTO: 27.2 PG (ref 26.8–34.3)
MCHC RBC AUTO-ENTMCNC: 31.8 G/DL (ref 31.4–37.4)
MCV RBC AUTO: 86 FL (ref 82–98)
PLATELET # BLD AUTO: 245 THOUSANDS/UL (ref 149–390)
PMV BLD AUTO: 10.2 FL (ref 8.9–12.7)
POTASSIUM SERPL-SCNC: 4 MMOL/L (ref 3.5–5.3)
PROTHROMBIN TIME: 12.3 SECONDS (ref 11.6–14.5)
RBC # BLD AUTO: 4.7 MILLION/UL (ref 3.81–5.12)
SODIUM SERPL-SCNC: 141 MMOL/L (ref 136–145)
WBC # BLD AUTO: 4.43 THOUSAND/UL (ref 4.31–10.16)

## 2021-08-02 PROCEDURE — 93325 DOPPLER ECHO COLOR FLOW MAPG: CPT | Performed by: INTERNAL MEDICINE

## 2021-08-02 PROCEDURE — 85027 COMPLETE CBC AUTOMATED: CPT | Performed by: NURSE PRACTITIONER

## 2021-08-02 PROCEDURE — 80048 BASIC METABOLIC PNL TOTAL CA: CPT | Performed by: NURSE PRACTITIONER

## 2021-08-02 PROCEDURE — 93320 DOPPLER ECHO COMPLETE: CPT | Performed by: INTERNAL MEDICINE

## 2021-08-02 PROCEDURE — 93312 ECHO TRANSESOPHAGEAL: CPT

## 2021-08-02 PROCEDURE — 85610 PROTHROMBIN TIME: CPT | Performed by: NURSE PRACTITIONER

## 2021-08-02 PROCEDURE — 93312 ECHO TRANSESOPHAGEAL: CPT | Performed by: INTERNAL MEDICINE

## 2021-08-02 PROCEDURE — 81025 URINE PREGNANCY TEST: CPT | Performed by: STUDENT IN AN ORGANIZED HEALTH CARE EDUCATION/TRAINING PROGRAM

## 2021-08-02 RX ORDER — PROPOFOL 10 MG/ML
INJECTION, EMULSION INTRAVENOUS CONTINUOUS PRN
Status: DISCONTINUED | OUTPATIENT
Start: 2021-08-02 | End: 2021-08-02

## 2021-08-02 RX ORDER — PROPOFOL 10 MG/ML
INJECTION, EMULSION INTRAVENOUS AS NEEDED
Status: DISCONTINUED | OUTPATIENT
Start: 2021-08-02 | End: 2021-08-02

## 2021-08-02 RX ORDER — LIDOCAINE HYDROCHLORIDE 10 MG/ML
INJECTION, SOLUTION EPIDURAL; INFILTRATION; INTRACAUDAL; PERINEURAL AS NEEDED
Status: DISCONTINUED | OUTPATIENT
Start: 2021-08-02 | End: 2021-08-02

## 2021-08-02 RX ORDER — MIDAZOLAM HYDROCHLORIDE 2 MG/2ML
INJECTION, SOLUTION INTRAMUSCULAR; INTRAVENOUS AS NEEDED
Status: DISCONTINUED | OUTPATIENT
Start: 2021-08-02 | End: 2021-08-02

## 2021-08-02 RX ORDER — SODIUM CHLORIDE, SODIUM LACTATE, POTASSIUM CHLORIDE, CALCIUM CHLORIDE 600; 310; 30; 20 MG/100ML; MG/100ML; MG/100ML; MG/100ML
INJECTION, SOLUTION INTRAVENOUS CONTINUOUS PRN
Status: DISCONTINUED | OUTPATIENT
Start: 2021-08-02 | End: 2021-08-02

## 2021-08-02 RX ADMIN — MIDAZOLAM HYDROCHLORIDE 2 MG: 1 INJECTION, SOLUTION INTRAMUSCULAR; INTRAVENOUS at 09:13

## 2021-08-02 RX ADMIN — LIDOCAINE HYDROCHLORIDE 90 MG: 10 INJECTION, SOLUTION EPIDURAL; INFILTRATION; INTRACAUDAL at 09:18

## 2021-08-02 RX ADMIN — PROPOFOL 100 MG: 10 INJECTION, EMULSION INTRAVENOUS at 09:18

## 2021-08-02 RX ADMIN — PROPOFOL 120 MCG/KG/MIN: 10 INJECTION, EMULSION INTRAVENOUS at 09:18

## 2021-08-02 RX ADMIN — SODIUM CHLORIDE, SODIUM LACTATE, POTASSIUM CHLORIDE, AND CALCIUM CHLORIDE: .6; .31; .03; .02 INJECTION, SOLUTION INTRAVENOUS at 09:09

## 2021-08-02 NOTE — ANESTHESIA PREPROCEDURE EVALUATION
Procedure:  CECELIA    Relevant Problems   CARDIO   (+) Hypertensive urgency      ENDO   (+) Hypothyroidism due to Hashimoto's thyroiditis      NEURO/PSYCH   (+) Stroke (HCC)      PULMONARY   (+) Asthma        Physical Exam    Airway    Mallampati score: II  TM Distance: >3 FB  Neck ROM: full     Dental       Cardiovascular  Rhythm: regular,     Pulmonary  Breath sounds clear to auscultation,     Other Findings        Anesthesia Plan  ASA Score- 3     Anesthesia Type-     Plan Factors-Exercise tolerance (METS): >4 METS  EKG reviewed  Existing labs reviewed  Patient summary reviewed  Patient is not a current smoker  Induction- intravenous  Postoperative Plan-     Informed Consent- Anesthetic plan and risks discussed with patient  I personally reviewed this patient with the CRNA  Discussed and agreed on the Anesthesia Plan with the CRNA  Sherre Soulier

## 2021-08-02 NOTE — DISCHARGE INSTRUCTIONS
Transesophageal Echocardiogram   WHAT YOU NEED TO KNOW:   A transesophageal echocardiogram (CECELIA) is a procedure used to check for problems with your heart  It will also show any problems in the blood vessels near your heart  Sound waves are sent to the heart through a tube inserted into your throat  The sound waves show the structure and function of your heart through pictures on a monitor  DISCHARGE INSTRUCTIONS:   Call your local emergency number (911 in the [address] [facility] [facility]) if:   · You have any of the following signs of a heart attack:      ? Squeezing, pressure, or pain in your chest    ? You may  also have any of the following:     § Discomfort or pain in your back, neck, jaw, stomach, or arm    § Shortness of breath    § Nausea or vomiting    § Lightheadedness or a sudden cold sweat      Call your doctor or cardiologist if:   · You have a fever or chills  · You taste blood  · You have a severe sore throat or trouble swallowing  · You have questions or concerns about your condition or care  Do not eat or drink anything until you are able to swallow normally:  Start with soft foods, such as oatmeal, yogurt, or applesauce  When you can eat soft foods easily, you may begin to eat solid foods  What you can do to keep your heart healthy:   · Eat heart-healthy foods  Eat whole grains, fruits, and vegetables every day  Limit salt and high-fat foods  Ask your healthcare provider for more information on a heart-healthy diet  · Exercise as directed  Your healthcare provider may suggest an exercise program to help improve your heart health  It is best to start slowly, and do more as you get stronger  Do not start an exercise program without talking with your healthcare provider  · Maintain a healthy weight  Keep a healthy weight so your heart does not have to work so hard  Ask what weight is healthy for you  Your healthcare provider can help you create a safe weight-loss plan, if needed      · Manage your medical conditions  High blood pressure, high cholesterol, and diabetes can lead to heart problems  Work with your healthcare provider to manage your medical conditions and decrease your risk for heart problems  · Do not smoke  Nicotine and other chemicals in cigarettes and cigars can cause lung damage  Ask your healthcare provider for information if you currently smoke and need help to quit  E-cigarettes or smokeless tobacco still contain nicotine  Talk to your healthcare provider before you use these products  Follow up with your doctor or cardiologist as directed:  Write down your questions so you remember to ask them during your visits  © Copyright [employer] 2021 Information is for End User's use only and may not be sold, redistributed or otherwise used for commercial purposes  All illustrations and images included in CareNotes® are the copyrighted property of [employer]  or Bandar Castro   The above information is an  only  It is not intended as medical advice for individual conditions or treatments  Talk to your doctor, nurse or pharmacist before following any medical regimen to see if it is safe and effective for you

## 2021-08-02 NOTE — ANESTHESIA POSTPROCEDURE EVALUATION
Post-Op Assessment Note    CV Status:  Stable  Pain Score: 0    Pain management: adequate     Mental Status:  Alert and awake   Hydration Status:  Euvolemic   PONV Controlled:  Controlled   Airway Patency:  Patent      Post Op Vitals Reviewed: Yes      Staff: CRNA         No complications documented      BP   110/62   Temp     Pulse 84   Resp   16   SpO2 97%

## 2021-08-16 ENCOUNTER — TELEPHONE (OUTPATIENT)
Dept: NEUROLOGY | Facility: CLINIC | Age: 46
End: 2021-08-16

## 2021-08-16 NOTE — TELEPHONE ENCOUNTER
Spoke with patient offered sooner appt with Dr Harvey Corbett (VIRTUAL) pt declined and states she wants to be seen in office  Pt is scheduled for 9/10 will keep on wait list and look for sooner appt IN OFFICE only

## 2021-08-24 ENCOUNTER — CONSULT (OUTPATIENT)
Dept: HEMATOLOGY ONCOLOGY | Facility: CLINIC | Age: 46
End: 2021-08-24
Payer: COMMERCIAL

## 2021-08-24 VITALS
RESPIRATION RATE: 18 BRPM | BODY MASS INDEX: 33.31 KG/M2 | HEART RATE: 82 BPM | WEIGHT: 181 LBS | TEMPERATURE: 98.4 F | OXYGEN SATURATION: 98 % | HEIGHT: 62 IN | DIASTOLIC BLOOD PRESSURE: 80 MMHG | SYSTOLIC BLOOD PRESSURE: 132 MMHG

## 2021-08-24 DIAGNOSIS — D68.59 THROMBOPHILIA DUE TO ANTITHROMBIN III DEFICIENCY (HCC): ICD-10-CM

## 2021-08-24 DIAGNOSIS — D68.59 PROTEIN S DEFICIENCY (HCC): Primary | ICD-10-CM

## 2021-08-24 PROCEDURE — 99243 OFF/OP CNSLTJ NEW/EST LOW 30: CPT | Performed by: INTERNAL MEDICINE

## 2021-08-24 RX ORDER — FLUTICASONE PROPIONATE AND SALMETEROL XINAFOATE 115; 21 UG/1; UG/1
AEROSOL, METERED RESPIRATORY (INHALATION)
COMMUNITY

## 2021-08-24 RX ORDER — AMLODIPINE BESYLATE 5 MG/1
TABLET ORAL
COMMUNITY

## 2021-08-25 NOTE — PROGRESS NOTES
HEMATOLOGY / ONCOLOGY CLINIC NOTE    Primary Care Provider: Johanna Garcia MD  Referring Provider: Joshua Wild  MRN: [id number]  : 1975    Reason for Encounter:  Chief Complaint   Patient presents with   [facility]         Hematology / Oncology History:     Rosey Calderon is a 50 y o  female who came in for follow up       1, borderline low protein S level    2, ? 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 TIA, imaging study showed a remote stroke, thrombosis panel showed borderline protein S deficiency patient was referred to Hematology for evaluation    Patient reported previous? TIA symptoms has completely resolved, patient does not have any history of venous thrombosis  No strong family history of venous thrombosis    Patient currently taking Plavix no issues no bleeding    No spontaneous  in past,  no bleeding, no other hematology issues in past    Nonsmoker  Problem list:     Patient Active Problem List   Diagnosis    Hypothyroidism due to Hashimoto's thyroiditis    Vitamin D deficiency    Stroke ([facility] [address] )    Asthma    [facility]    Class 1 obesity in adult    Endometriosis    Hypertensive urgency       PHYSICIAL EXAMINATION:       Vital Signs:   /80 (BP Location: Left arm)   Pulse 82   Temp 98 4 °F (36 9 °C) (Tympanic Core)   Resp 18   Ht 5' 1 5" (1 562 m)   Wt 82 1 kg (181 lb)   SpO2 98%   BMI 33 65 kg/m²   Body surface area is 1 82 meters squared  Ht Readings from Last 3 Encounters:   21 5' 1 5" (1 562 m)   21 5' 1 5" (1 562 m)   21 5' 1 5" (1 562 m)       Wt Readings from Last 3 Encounters:   21 82 1 kg (181 lb)   21 85 7 kg (189 lb)   21 81 6 kg (180 lb)        Temp Readings from Last 3 Encounters:   21 98 4 °F (36 9 °C) (Tympanic Core)   21 (!) 97 3 °F (36 3 °C)   21 97 6 °F (36 4 °C) (Oral)        BP Readings from Last 3 Encounters:   21 132/80   21 135/79   21 148/86         Pulse Readings from Last 3 Encounters:   21 82   21 76   21 70         Appears comfortable, no focal neuro deficits, facial structure symmetrical, no major finding  GEN: Alert, awake oriented x3, in no acute distress  HEENT- No pallor, icterus, cyanosis, no oral mucosal lesions,   LAD - no palpable cervical, clavicle, axillary, inguinal LAD  Heart- normal S1 S2, regular rate and rhythm, No murmur, rubs  Lungs- decreased breathing sound bilateral    Abdomen- soft, Non tender, bowel sounds present  Extremities- No cyanosis, clubbing, edema  Neuro- No focal neurological deficit           PAST MEDICAL HISTORY:   has a past medical history of Disease of thyroid gland, Endometriosis, and Sjogren's syndrome ([facility] )      PAST SURGICAL HISTORY:   has a past surgical history that includes  section and [place] tooth extraction  CURRENT MEDICATIONS:   Current Outpatient Medications   Medication Sig Dispense Refill    ADVAIR -21 MCG/ACT inhaler Inhale 2 puffs 2 (two) times a day Rinse mouth after use  2    albuterol (2 5 mg/3 mL) 0 083 % nebulizer solution Take 2 5 mg by nebulization every 6 (six) hours as needed for wheezing or shortness of breath      albuterol (PROVENTIL HFA,VENTOLIN HFA) 90 mcg/act inhaler albuterol sulfate HFA 90 mcg/actuation aerosol inhaler   INHALE 2 PUFFS EVERY 4 HOURS AS NEEDED  MAY USE 2 PUFFS 20-30 MINUTES BEFORE PHYSICAL EXERTION      amLODIPine (NORVASC) 5 mg tablet amlodipine 5 mg tablet   TAKE 1 TABLET BY MOUTH EVERY DAY IN THE EVENING      fluticasone-salmeterol (Advair HFA) 115-21 MCG/ACT inhaler Advair  mcg-21 mcg/actuation aerosol inhaler   INHALE 2 PUFFS DAILY, USE REGUARLY  RINSE MOUTH AFTER USE      loratadine (CLARITIN) 10 mg tablet Take 10 mg by mouth daily      Synthroid 112 MCG tablet Take 1 tab daily Monday through Saturday and 1 5 tab sunday 100 tablet 1    atorvastatin (LIPITOR) 40 mg tablet Take 1 tablet (40 mg total) by mouth every evening 30 tablet 0    clopidogrel (PLAVIX) 75 mg tablet Take 1 tablet (75 mg total) by mouth daily 30 tablet 0     No current facility-administered medications for this visit  [unfilled]    SOCIAL HISTORY:   reports that she has never smoked  She has never used smokeless tobacco  She reports previous alcohol use  She reports that she does not use drugs  FAMILY HISTORY:  family history includes Cancer in her maternal grandmother; Diabetes in her mother; Heart disease in her father and mother; Hypertension in her mother; No Known Problems in her daughter, daughter, maternal aunt, maternal grandfather, paternal aunt, paternal grandfather, paternal grandmother, and sister  ALLERGIES:  is allergic to asa [aspirin] and avelox [moxifloxacin]      REVIEW OF SYSTEMS:  Please note that a 14-point review of systems was performed to include Constitutional, HEENT, Respiratory, CVS, GI, , Musculoskeletal, Integumentary, Neurologic, Rheumatologic, Endocrinologic, Psychiatric, Lymphatic, and Hematologic/Oncologic systems were reviewed and are negative unless otherwise stated in HPI  Positive and negative findings pertinent to this evaluation are incorporated into the history of present illness  LAB:  Lab Results   Component Value Date    WBC 4 43 2021    HGB 12 8 2021    HCT 40 3 2021    MCV 86 2021     2021     Lab Results   Component Value Date     2015    SODIUM 141 2021    K 4 0 2021     2021    CO2 29 2021    ANIONGAP 7 2015    AGAP 7 2021    BUN 13 2021    CREATININE 0 87 2021    GLUC 81 2021    GLUF 81 2021    CALCIUM 8 5 2021    AST 43 2015    ALT 55 2015    ALKPHOS 52 2015    PROT 8 3 (H) 2015    BILITOT 0 69 2015    EGFR 80 2021       CBC with diff:       Invalid input(s):  RBC, TOTALCELLSCOUNTED, SEGS%, GRANS%, LYMPHS%, EOS%, BASO%, ABNEUT, ABGRANS, ABLYMPHS, ABMOMOS, ABEOS, ABBASO    CMP:      Invalid input(s): ALBUMIN        IMAGING:  No orders to display     CECELIA    Result Date: 2021  Narrative: [address] [place] [address], [address] [place] [address] [phone] Transesophageal Echocardiogram 2D, Doppler, and Color Doppler Study date:  02-Aug-2021 Patient: Patti Ellis MR number: [id number] Account number: [de-identified] : 1975 Age: 50 years Gender: Female Status: Outpatient Location: Cath lab Height: 61 5 in Weight: 180 lb BP: 135/ 79 mmHg Indications: CVA   Diagnoses: I63 9 - Cerebral infarction, unspecified Sonographer:  Murlene Boas, RDCS Interpreting Physician:  Sherral Closs, MD Primary Physician:  Radha Ballard MD Referring Physician:  Juan Clements MD Group:  [facility] IMPRESSIONS: There was no echocardiographic evidence for a cardiac source of embolism  SUMMARY LEFT VENTRICLE: Systolic function was normal by visual assessment  Ejection fraction was estimated to be 60 %  There were no regional wall motion abnormalities  No evidence of apical thrombus  RIGHT VENTRICLE: The size was normal  Systolic function was normal  LEFT ATRIUM: Size was normal  No thrombus was identified  LEFT ATRIAL APPENDAGE: No thrombus was identified  ATRIAL SEPTUM: No defect or patent foramen ovale was identified  Contrast injection was performed  There was no right-to-left shunt, with provocative maneuvers to increase right atrial pressure  HISTORY: PRIOR HISTORY: Asthma  Hashimoto's  Hypertension  PROCEDURE: The procedure was performed in the catheterization laboratory  This was a routine study  The risks and alternatives of the procedure were explained to the patient and informed consent was obtained  The transesophageal approach was used  The study included complete 2D imaging, limited spectral Doppler, color Doppler, and probe insertion (without interpretation)  The heart rate was 91 bpm, at the start of the study  An adult omniplane probe was inserted by the attending cardiologist  Intubated with ease  One intubation attempt(s)  There was no blood detected on the probe  Intravenous contrast (agitated saline) was administered to evaluate shunting  There were no complications during the procedure  MEDICATIONS: Benzocaine spray, topical to oropharynx, prior to procedure  Anesthesia administered by anesthesia team  LEFT VENTRICLE: Size was normal  Systolic function was normal by visual assessment  Ejection fraction was estimated to be 60 %  There were no regional wall motion abnormalities  Wall thickness was normal  No evidence of apical thrombus  RIGHT VENTRICLE: The size was normal  Systolic function was normal  Wall thickness was normal  LEFT ATRIUM: Size was normal  No thrombus was identified   APPENDAGE: The size was normal  No thrombus was identified  ATRIAL SEPTUM: No defect or patent foramen ovale was identified  Contrast injection was performed  There was no right-to-left shunt, with provocative maneuvers to increase right atrial pressure  RIGHT ATRIUM: Size was normal  MITRAL VALVE: Valve structure was normal  There was normal leaflet separation  DOPPLER: The transmitral velocity was within the normal range  There was no evidence for stenosis  There was no significant regurgitation  AORTIC VALVE: The valve was trileaflet  Leaflets exhibited normal thickness and normal cuspal separation  DOPPLER: Transaortic velocity was within the normal range  There was no evidence for stenosis  There was no significant regurgitation  TRICUSPID VALVE: The valve structure was normal  There was normal leaflet separation  DOPPLER: There was no significant regurgitation  PULMONIC VALVE: Leaflets exhibited normal thickness, no calcification, and normal cuspal separation  DOPPLER: The transpulmonic velocity was within the normal range  There was no significant regurgitation  PERICARDIUM: There was no pericardial effusion  The pericardium was normal in appearance  AORTA: The root exhibited normal size  SYSTEMIC VEINS: IVC: The inferior vena cava was normal in size   [address] Accredited Echocardiography Laboratory Prepared and electronically signed by Mendel Coon, MD Signed 02-Aug-2021 17:35:58

## 2021-09-07 LAB
CREAT ?TM UR-SCNC: 13.2 UMOL/L
EXT MICROALBUMIN URINE RANDOM: 77.5
MICROALBUMIN/CREAT UR: 0.17 MG/G{CREAT}

## 2021-09-10 ENCOUNTER — OFFICE VISIT (OUTPATIENT)
Dept: NEUROLOGY | Facility: CLINIC | Age: 46
End: 2021-09-10
Payer: COMMERCIAL

## 2021-09-10 VITALS
BODY MASS INDEX: 35.51 KG/M2 | HEART RATE: 76 BPM | SYSTOLIC BLOOD PRESSURE: 128 MMHG | WEIGHT: 193 LBS | HEIGHT: 62 IN | DIASTOLIC BLOOD PRESSURE: 80 MMHG | TEMPERATURE: 97.3 F

## 2021-09-10 DIAGNOSIS — I63.9 STROKE (HCC): ICD-10-CM

## 2021-09-10 PROCEDURE — 99213 OFFICE O/P EST LOW 20 MIN: CPT | Performed by: PSYCHIATRY & NEUROLOGY

## 2021-09-10 NOTE — PROGRESS NOTES
Patient ID: Paulette Ma is a 50 y o  female  Assessment/Plan:      Hypertensive emergency with focal neurologic deficits of aphasia and facial weakness  MRI without a acute ischemic infarction but evidence of a prior silent stroke  of concern is a prior right temporoparietal infarct  Despite monitoring in the hospital and normal CECELIA I feel it is warranted to advise the patient to have a loop recorder placed and I explained the rationale in detail to the patient  Also complicating the patient is are history of connective tissue disease however this was not a picture of vasculitis   Patient did describe remote history of scotoma without migraine  I answered multiple details and questions of the patient  Time with patient was 35 minutes more than half of which was spent in care coordination and counseling  Follow-up in 3 months  I will reviewed the cardinal warning signs of stroke  Subjective:    HPI          Patient presents to the office today following recent hospitalization for chronic stroke  Since discharge, she denies experiencing any new or worsening stroke-like symptoms  Patient reports that she has been having worsening headaches  Medication Review:  I reviewed medications with her  Patient reports having no difficulties obtaining medications  Reports she is taking all as prescribed with no missed doses, medication side effects, or signs of bleeding  Risk Factors / Education:  We reviewed stroke, personal risk factors and management, medications, and resources  Patient verbalizes understanding of teaching  she has been managing personal stroke risk factors and reports the following: she is non smoker, states that she tried to decrease red meats, etc for diet  Patient reports she monitors her BP at home  Reported average BP continues to be 126-119  I addressed all her questions   At the conclusion of the conversation, patient denies having any further questions or concerns  Objective:    Blood pressure 128/80, pulse 76, temperature (!) 97 3 °F (36 3 °C), temperature source Temporal, height 5' 1 5" (1 562 m), weight 87 5 kg (193 lb), not currently breastfeeding  Physical Exam    Neurological Exam observational exam   Patient is with normal speech and   No dysphasia is present  Cranial nerves are intact  movement strength in gait is normal       ROS:    Review of Systems   Constitutional: Negative  Negative for appetite change and fever  HENT: Negative  Negative for hearing loss, tinnitus, trouble swallowing and voice change  Eyes: Negative  Negative for photophobia and pain  Respiratory: Negative  Negative for shortness of breath  Cardiovascular: Negative  Negative for palpitations  Gastrointestinal: Negative  Negative for nausea and vomiting  Endocrine: Negative  Negative for cold intolerance  Genitourinary: Negative  Negative for dysuria, frequency and urgency  Musculoskeletal: Negative  Negative for myalgias and neck pain  Skin: Negative  Negative for rash  Neurological: Positive for headaches  Negative for dizziness, tremors, seizures, syncope, facial asymmetry, speech difficulty, weakness, light-headedness and numbness  Hematological: Negative  Does not bruise/bleed easily  Psychiatric/Behavioral: Negative  Negative for confusion, hallucinations and sleep disturbance

## 2021-09-25 ENCOUNTER — HOSPITAL ENCOUNTER (EMERGENCY)
Facility: HOSPITAL | Age: 46
Discharge: HOME | End: 2021-09-25
Attending: EMERGENCY MEDICINE | Admitting: EMERGENCY MEDICINE
Payer: COMMERCIAL

## 2021-09-25 ENCOUNTER — APPOINTMENT (EMERGENCY)
Dept: CT IMAGING | Facility: HOSPITAL | Age: 46
End: 2021-09-25
Payer: COMMERCIAL

## 2021-09-25 VITALS
WEIGHT: 189.6 LBS | SYSTOLIC BLOOD PRESSURE: 135 MMHG | RESPIRATION RATE: 18 BRPM | OXYGEN SATURATION: 97 % | BODY MASS INDEX: 35.8 KG/M2 | HEIGHT: 61 IN | TEMPERATURE: 98.4 F | HEART RATE: 64 BPM | DIASTOLIC BLOOD PRESSURE: 74 MMHG

## 2021-09-25 DIAGNOSIS — R20.2 PARESTHESIAS IN RIGHT HAND: ICD-10-CM

## 2021-09-25 DIAGNOSIS — R20.2 PARESTHESIAS IN LEFT HAND: Primary | ICD-10-CM

## 2021-09-25 DIAGNOSIS — M47.812 DEGENERATIVE ARTHRITIS OF CERVICAL SPINE: ICD-10-CM

## 2021-09-25 DIAGNOSIS — M54.12 CERVICAL RADICULOPATHY: ICD-10-CM

## 2021-09-25 LAB
ALBUMIN SERPL BCP-MCNC: 3.9 G/DL (ref 3.5–5)
ALP SERPL-CCNC: 72 U/L (ref 46–116)
ALT SERPL W P-5'-P-CCNC: 83 U/L (ref 12–78)
ANION GAP SERPL CALCULATED.3IONS-SCNC: 10 MMOL/L (ref 4–13)
APTT PPP: 28 SECONDS (ref 23–37)
AST SERPL W P-5'-P-CCNC: 55 U/L (ref 5–45)
ATRIAL RATE: 69 BPM
BASOPHILS # BLD AUTO: 0.03 THOUSANDS/ΜL (ref 0–0.1)
BASOPHILS NFR BLD AUTO: 1 % (ref 0–1)
BILIRUB SERPL-MCNC: 0.72 MG/DL (ref 0.2–1)
BUN SERPL-MCNC: 15 MG/DL (ref 5–25)
CALCIUM SERPL-MCNC: 8.6 MG/DL (ref 8.3–10.1)
CHLORIDE SERPL-SCNC: 105 MMOL/L (ref 100–108)
CO2 SERPL-SCNC: 25 MMOL/L (ref 21–32)
CREAT SERPL-MCNC: 0.8 MG/DL (ref 0.6–1.3)
EOSINOPHIL # BLD AUTO: 0.09 THOUSAND/ΜL (ref 0–0.61)
EOSINOPHIL NFR BLD AUTO: 3 % (ref 0–6)
ERYTHROCYTE [DISTWIDTH] IN BLOOD BY AUTOMATED COUNT: 13 % (ref 11.6–15.1)
GFR SERPL CREATININE-BSD FRML MDRD: 88 ML/MIN/1.73SQ M
GLUCOSE SERPL-MCNC: 95 MG/DL (ref 65–140)
HCT VFR BLD AUTO: 37.7 % (ref 34.8–46.1)
HGB BLD-MCNC: 12.1 G/DL (ref 11.5–15.4)
IMM GRANULOCYTES # BLD AUTO: 0.01 THOUSAND/UL (ref 0–0.2)
IMM GRANULOCYTES NFR BLD AUTO: 0 % (ref 0–2)
INR PPP: 1 (ref 0.84–1.19)
LYMPHOCYTES # BLD AUTO: 0.83 THOUSANDS/ΜL (ref 0.6–4.47)
LYMPHOCYTES NFR BLD AUTO: 26 % (ref 14–44)
MCH RBC QN AUTO: 27.2 PG (ref 26.8–34.3)
MCHC RBC AUTO-ENTMCNC: 32.1 G/DL (ref 31.4–37.4)
MCV RBC AUTO: 85 FL (ref 82–98)
MONOCYTES # BLD AUTO: 0.36 THOUSAND/ΜL (ref 0.17–1.22)
MONOCYTES NFR BLD AUTO: 11 % (ref 4–12)
NEUTROPHILS # BLD AUTO: 1.92 THOUSANDS/ΜL (ref 1.85–7.62)
NEUTS SEG NFR BLD AUTO: 59 % (ref 43–75)
NRBC BLD AUTO-RTO: 0 /100 WBCS
P AXIS: 61 DEGREES
PLATELET # BLD AUTO: 234 THOUSANDS/UL (ref 149–390)
PMV BLD AUTO: 10.1 FL (ref 8.9–12.7)
POTASSIUM SERPL-SCNC: 4 MMOL/L (ref 3.5–5.3)
PR INTERVAL: 174 MS
PROT SERPL-MCNC: 7.8 G/DL (ref 6.4–8.2)
PROTHROMBIN TIME: 13.2 SECONDS (ref 11.6–14.5)
QRS AXIS: 16 DEGREES
QRSD INTERVAL: 70 MS
QT INTERVAL: 390 MS
QTC INTERVAL: 409 MS
RBC # BLD AUTO: 4.45 MILLION/UL (ref 3.81–5.12)
SODIUM SERPL-SCNC: 140 MMOL/L (ref 136–145)
T WAVE AXIS: -1 DEGREES
TROPONIN I SERPL-MCNC: <0.02 NG/ML
TSH SERPL DL<=0.05 MIU/L-ACNC: 0.8 UIU/ML (ref 0.36–3.74)
VENTRICULAR RATE: 66 BPM
WBC # BLD AUTO: 3.24 THOUSAND/UL (ref 4.31–10.16)

## 2021-09-25 PROCEDURE — 85730 THROMBOPLASTIN TIME PARTIAL: CPT | Performed by: EMERGENCY MEDICINE

## 2021-09-25 PROCEDURE — 85610 PROTHROMBIN TIME: CPT | Performed by: EMERGENCY MEDICINE

## 2021-09-25 PROCEDURE — 93010 ELECTROCARDIOGRAM REPORT: CPT | Performed by: INTERNAL MEDICINE

## 2021-09-25 PROCEDURE — 84443 ASSAY THYROID STIM HORMONE: CPT | Performed by: EMERGENCY MEDICINE

## 2021-09-25 PROCEDURE — 85025 COMPLETE CBC W/AUTO DIFF WBC: CPT | Performed by: EMERGENCY MEDICINE

## 2021-09-25 PROCEDURE — 70450 CT HEAD/BRAIN W/O DYE: CPT

## 2021-09-25 PROCEDURE — 99285 EMERGENCY DEPT VISIT HI MDM: CPT

## 2021-09-25 PROCEDURE — 84484 ASSAY OF TROPONIN QUANT: CPT | Performed by: EMERGENCY MEDICINE

## 2021-09-25 PROCEDURE — 93005 ELECTROCARDIOGRAM TRACING: CPT

## 2021-09-25 PROCEDURE — G1004 CDSM NDSC: HCPCS

## 2021-09-25 PROCEDURE — 80053 COMPREHEN METABOLIC PANEL: CPT | Performed by: EMERGENCY MEDICINE

## 2021-09-25 PROCEDURE — 99285 EMERGENCY DEPT VISIT HI MDM: CPT | Performed by: EMERGENCY MEDICINE

## 2021-09-25 PROCEDURE — 72125 CT NECK SPINE W/O DYE: CPT

## 2021-09-25 PROCEDURE — 36415 COLL VENOUS BLD VENIPUNCTURE: CPT | Performed by: EMERGENCY MEDICINE

## 2021-09-25 RX ORDER — ACETAMINOPHEN 325 MG/1
650 TABLET ORAL ONCE
Status: COMPLETED | OUTPATIENT
Start: 2021-09-25 | End: 2021-09-25

## 2021-09-25 RX ADMIN — ACETAMINOPHEN 650 MG: 325 TABLET, FILM COATED ORAL at 12:56

## 2021-09-25 NOTE — ED PROVIDER NOTES
History  Chief Complaint   Patient presents with    Numbness     Pt presents via EMS, coming from home  Pt states she has had intermittent bilateral hand numbness, since wednesday  Pt also c/o of a shooting pain that shoots up her bilateral arms  43-year-old female presents by EMS for evaluation bilateral hand numbness and arm pain  Patient states that she has been experiencing intermittent episodes hand numbness  She 1st noticed the symptoms on Wednesday 3 days ago  She states today she was woken from sleep around 5:30 a m  And had pain and numbness in both hands and wrists  She states that the pain lasted approximately 20 minutes and radiated from the hands into the upper extremities towards the shoulders but did not involve her neck  Patient took Tylenol with some improvement pain  She states that the arm pain has improved however she continues with hand numbness  She feels that the fingers were swollen and tingly  Swelling has improved  Patient also reports generalized headache, acute on chronic  Patient arrives with elevated blood pressure and appears very anxious  She states that she took her Norvasc last night  She was admitted a few months ago after having transient facial droop and speech difficulties  During her workup in the hospital she was 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        History provided by:  Patient, medical records and EMS personnel   used: No    Medical Problem  Location:  Bilateral upper extremity numbness, tingling, and pain  Quality:  Pain radiates from the hands and wrists into the upper arms and shoulders  Severity:  Severe  Onset quality:  Unable to specify  Duration:  3 days  Timing:  Intermittent  Progression:  Worsening  Chronicity:  New  Context:  Patient has remote history carpal tunnel but states this feels different  Relieved by:  Nothing  Worsened by:  Nothing  Ineffective treatments:  None  Associated symptoms: headaches Associated symptoms: no abdominal pain, no chest pain, no cough, no fever, no nausea, no shortness of breath and no vomiting        Prior to Admission Medications   Prescriptions Last Dose Informant Patient Reported? Taking? ADVAIR -21 MCG/ACT inhaler   Yes No   Sig: Inhale 2 puffs 2 (two) times a day Rinse mouth after use   Synthroid 112 MCG tablet   No No   Sig: Take 1 tab daily Monday through Saturday and 1 5 tab    albuterol (2 5 mg/3 mL) 0 083 % nebulizer solution   Yes No   Sig: Take 2 5 mg by nebulization every 6 (six) hours as needed for wheezing or shortness of breath   albuterol (PROVENTIL HFA,VENTOLIN HFA) 90 mcg/act inhaler   Yes No   Sig: albuterol sulfate HFA 90 mcg/actuation aerosol inhaler   INHALE 2 PUFFS EVERY 4 HOURS AS NEEDED  MAY USE 2 PUFFS 20-30 MINUTES BEFORE PHYSICAL EXERTION   amLODIPine (NORVASC) 5 mg tablet   Yes No   Sig: amlodipine 5 mg tablet   TAKE 1 TABLET BY MOUTH EVERY DAY IN THE EVENING   atorvastatin (LIPITOR) 40 mg tablet   No No   Sig: Take 1 tablet (40 mg total) by mouth every evening   clopidogrel (PLAVIX) 75 mg tablet   No No   Sig: Take 1 tablet (75 mg total) by mouth daily   fluticasone-salmeterol (Advair HFA) 115-21 MCG/ACT inhaler   Yes No   Sig: Advair  mcg-21 mcg/actuation aerosol inhaler   INHALE 2 PUFFS DAILY, USE REGUARLY   RINSE MOUTH AFTER USE   loratadine (CLARITIN) 10 mg tablet   Yes No   Sig: Take 10 mg by mouth daily      Facility-Administered Medications: None       Past Medical History:   Diagnosis Date    Disease of thyroid gland     Endometriosis     Sjogren's syndrome ([facility] [address] )        Past Surgical History:   Procedure Laterality Date     SECTION      WISDOM TOOTH EXTRACTION         Family History   Problem Relation Age of Onset    Heart disease Mother     Diabetes Mother     Hypertension Mother     Heart disease Father     No Known Problems Sister     No Known Problems Daughter     No Known Problems Maternal Aunt  No Known Problems Paternal Aunt     No Known Problems Daughter     Cancer Maternal Grandmother     No Known Problems Maternal Grandfather     No Known Problems Paternal Grandmother     No Known Problems Paternal Grandfather      I have reviewed and agree with the history as documented  E-Cigarette/Vaping    E-Cigarette Use Never User      E-Cigarette/Vaping Substances     Social History     Tobacco Use    Smoking status: Never Smoker    Smokeless tobacco: Never Used   Vaping Use    Vaping Use: Never used   Substance Use Topics    Alcohol use: Not Currently     Comment: social    Drug use: No       Review of Systems   Constitutional: Negative for appetite change and fever  Respiratory: Negative for cough and shortness of breath  Cardiovascular: Negative for chest pain, palpitations and leg swelling  Gastrointestinal: Negative for abdominal pain, nausea and vomiting  Musculoskeletal: Negative for back pain  Skin: Negative for wound  Neurological: Positive for numbness and headaches  Negative for dizziness, tremors, seizures, facial asymmetry and speech difficulty  Psychiatric/Behavioral: Positive for sleep disturbance (sx woke her from sleep)  All other systems reviewed and are negative  Physical Exam  Physical Exam  Vitals and nursing note reviewed  Constitutional:       General: She is not in acute distress  Appearance: Normal appearance  She is well-developed  She is not ill-appearing, toxic-appearing or diaphoretic  HENT:      Head: Normocephalic and atraumatic  Right Ear: External ear normal       Left Ear: External ear normal       Nose: Nose normal       Mouth/Throat:      Mouth: Mucous membranes are moist       Pharynx: No oropharyngeal exudate  Eyes:      General: No scleral icterus  Conjunctiva/sclera: Conjunctivae normal       Pupils: Pupils are equal, round, and reactive to light     Cardiovascular:      Rate and Rhythm: Normal rate and regular rhythm  Pulses: Normal pulses  Heart sounds: Normal heart sounds  Pulmonary:      Effort: Pulmonary effort is normal  No respiratory distress  Breath sounds: Normal breath sounds  No rhonchi  Abdominal:      General: Bowel sounds are normal  There is no distension  Palpations: Abdomen is soft  Tenderness: There is no abdominal tenderness  There is no guarding or rebound  Musculoskeletal:         General: No tenderness or deformity  Normal range of motion  Cervical back: Normal range of motion and neck supple  Lymphadenopathy:      Cervical: No cervical adenopathy  Skin:     General: Skin is warm and dry  Capillary Refill: Capillary refill takes less than 2 seconds  Findings: No rash  Comments: NO HAND SWELLING OR ERYTHEMA NOTED   Neurological:      General: No focal deficit present  Mental Status: She is alert and oriented to person, place, and time  GCS: GCS eye subscore is 4  GCS verbal subscore is 5  GCS motor subscore is 6  Cranial Nerves: Cranial nerves are intact  No cranial nerve deficit  Sensory: Sensation is intact  No sensory deficit  Motor: Motor function is intact  No weakness, tremor, atrophy, abnormal muscle tone or seizure activity  Coordination: Coordination is intact  Coordination normal       Comments: + TINEL TEST B/L, +PHALENS  PATIENT HAS 5/5  STRENGTH   Psychiatric:         Mood and Affect: Mood is anxious  Affect is flat  Speech: Speech normal          Behavior: Behavior normal  Behavior is cooperative  Thought Content:  Thought content normal          Cognition and Memory: Cognition normal          Judgment: Judgment normal          Vital Signs  ED Triage Vitals   Temperature Pulse Respirations Blood Pressure SpO2   09/25/21 1031 09/25/21 1031 09/25/21 1031 09/25/21 1031 09/25/21 1031   98 4 °F (36 9 °C) 70 18 (!) 179/87 100 %      Temp Source Heart Rate Source Patient Position - Orthostatic VS BP Location FiO2 (%)   09/25/21 1031 09/25/21 1031 09/25/21 1031 09/25/21 1031 --   Oral Monitor Lying Right arm       Pain Score       09/25/21 1231       9           Vitals:    09/25/21 1031 09/25/21 1230   BP: (!) 179/87 135/74   Pulse: 70 64   Patient Position - Orthostatic VS: Lying Lying         Visual Acuity  Visual Acuity      Most Recent Value   L Pupil Size (mm)  3   R Pupil Size (mm)  3          ED Medications  Medications   acetaminophen (TYLENOL) tablet 650 mg (650 mg Oral Given 9/25/21 1256)       Diagnostic Studies  Results Reviewed     Procedure Component Value Units Date/Time    TSH [[id number]]  (Normal) Collected: 09/25/21 1107    Lab Status: Final result Specimen: Blood from Arm, Right Updated: 09/25/21 1144     TSH 3RD GENERATON 0 796 uIU/mL     Narrative:      Patients undergoing fluorescein dye angiography may retain small amounts of fluorescein in the body for 48-72 hours post procedure  Samples containing fluorescein can produce falsely depressed TSH values  If the patient had this procedure,a specimen should be resubmitted post fluorescein clearance        Comprehensive metabolic panel [[id number]]  (Abnormal) Collected: 09/25/21 1107    Lab Status: Final result Specimen: Blood from Arm, Right Updated: 09/25/21 1144     Sodium 140 mmol/L      Potassium 4 0 mmol/L      Chloride 105 mmol/L      CO2 25 mmol/L      ANION GAP 10 mmol/L      BUN 15 mg/dL      Creatinine 0 80 mg/dL      Glucose 95 mg/dL      Calcium 8 6 mg/dL      AST 55 U/L      ALT 83 U/L      Alkaline Phosphatase 72 U/L      Total Protein 7 8 g/dL      Albumin 3 9 g/dL      Total Bilirubin 0 72 mg/dL      eGFR 88 ml/min/1 73sq m     Narrative:      [place] guidelines for Chronic Kidney Disease (CKD):     Stage 1 with normal or high GFR (GFR > 90 mL/min/1 73 square meters)    Stage 2 Mild CKD (GFR = 60-89 mL/min/1 73 square meters)    Stage 3A Moderate CKD (GFR = 45-59 mL/min/1 73 square meters)    Stage 3B Moderate CKD (GFR = 30-44 mL/min/1 73 square meters)    Stage 4 Severe CKD (GFR = 15-29 mL/min/1 73 square meters)    Stage 5 End Stage CKD (GFR <15 mL/min/1 73 square meters)  Note: GFR calculation is accurate only with a steady state creatinine    Protime-INR [[id number]]  (Normal) Collected: 09/25/21 1107    Lab Status: Final result Specimen: Blood from Arm, Right Updated: 09/25/21 1140     Protime 13 2 seconds      INR 1 00    APTT [[id number]]  (Normal) Collected: 09/25/21 1107    Lab Status: Final result Specimen: Blood from Arm, Right Updated: 09/25/21 1140     PTT 28 seconds     Troponin I [[id number]]  (Normal) Collected: 09/25/21 1107    Lab Status: Final result Specimen: Blood from Arm, Right Updated: 09/25/21 1138     Troponin I <0 02 ng/mL     CBC and differential [[id number]]  (Abnormal) Collected: 09/25/21 1107    Lab Status: Final result Specimen: Blood from Arm, Right Updated: 09/25/21 1118     WBC 3 24 Thousand/uL      RBC 4 45 Million/uL      Hemoglobin 12 1 g/dL      Hematocrit 37 7 %      MCV 85 fL      MCH 27 2 pg      MCHC 32 1 g/dL      RDW 13 0 %      MPV 10 1 fL      Platelets 502 Thousands/uL      nRBC 0 /100 WBCs      Neutrophils Relative 59 %      Immat GRANS % 0 %      Lymphocytes Relative 26 %      Monocytes Relative 11 %      Eosinophils Relative 3 %      Basophils Relative 1 %      Neutrophils Absolute 1 92 Thousands/µL      Immature Grans Absolute 0 01 Thousand/uL      Lymphocytes Absolute 0 83 Thousands/µL      Monocytes Absolute 0 36 Thousand/µL      Eosinophils Absolute 0 09 Thousand/µL      Basophils Absolute 0 03 Thousands/µL                  CT head without contrast   Final Result by Doug Farah MD (09/25 1219)      No acute intracranial abnormality  Stable encephalomalacia right posterior temporal parietal region                    Workstation performed: [id number]         CT cervical spine without contrast   Final Result by Doug Farah MD (09/25 1225)      No cervical spine fracture or traumatic malalignment  Minor, noncompressive degenerative changes  If Patient has focal radicular symptoms, outpatient cervical MR could be performed  Workstation performed: [id number]                    Procedures  ECG 12 Lead Documentation Only    Date/Time: 9/25/2021 11:36 AM  Performed by: Nacho Beverly DO  Authorized by: Nacho Beverly DO     Indications / Diagnosis:  Numbness of hands  ECG reviewed by me, the ED Provider: yes    Patient location:  ED  Previous ECG:     Previous ECG:  Compared to current    Comparison ECG info:  July 16, 2021    Similarity:  No change  Interpretation:     Interpretation: non-specific    Rate:     ECG rate:  66    ECG rate assessment: normal    Rhythm:     Rhythm: sinus rhythm    Ectopy:     Ectopy: none    QRS:     QRS axis:  Normal  Conduction:     Conduction: normal    ST segments:     ST segments:  Normal  T waves:     T waves: normal    Comments:      Low voltage             ED Course  ED Course as of Sep 25 1938   Sat Sep 25, 2021   1045 Bedside testing performed by me  Patient did experience numbness and tingling when she flexes both wrists at 90 degrees in places the dorsums together  She did also have reproduction of pain with palpation of the median nerve  I verbalized my concern for possible carpal tunnel syndrome and she insists that this is not symptoms consistent with carpal tunnel if she has had these before when she was pregnant and has splints at home that this feels significantly different  She does report repetitive movements with her hands at work   Patient re-evaluated by me  Blood pressure is improved to 140 6/75  She states that she now has headache  Her numbness in her hands has resolved but she does have some hand and wrist pain  Patient updated with available work results  Blood work appears to be at or near her baseline though she has a mild leukopenia today that is new  MDM  Number of Diagnoses or Management Options  Cervical radiculopathy: new and requires workup  Degenerative arthritis of cervical spine: new and requires workup  Paresthesias in left hand: new and requires workup  Paresthesias in right hand: new and requires workup     Amount and/or Complexity of Data Reviewed  Clinical lab tests: reviewed and ordered  Tests in the radiology section of CPT®: ordered and reviewed  Decide to obtain previous medical records or to obtain history from someone other than the patient: yes  Independent visualization of images, tracings, or specimens: yes    Risk of Complications, Morbidity, and/or Mortality  General comments: 61-year-old female presents with 3 day history of intermittent bilateral hand and arm pain and numbness  Patient's symptoms improved while in the department  Suspect this is secondary to carpal tunnel however patient does have some mild arthritic changes in the cervical spine this could be cervical radiculopathy  Patient will follow-up with PCP for further workup  We discussed possible need for MRI or EMG in the future  We discussed signs and symptoms return to the emergency department  Patient's blood pressure improved without intervention      Patient Progress  Patient progress: improved      Disposition  Final diagnoses:   Paresthesias in left hand   Paresthesias in right hand   Degenerative arthritis of cervical spine   Cervical radiculopathy     Time reflects when diagnosis was documented in both MDM as applicable and the Disposition within this note     Time User Action Codes Description Comment    9/25/2021 12:51 PM Ayesha Díaz Add [R20 2] Paresthesias in left hand     9/25/2021 12:52 PM Ayesha Díaz Add [R20 2] Paresthesias in right hand     9/25/2021 12:52 PM Gomez Lowry [Y60 693] Degenerative arthritis of cervical spine     9/25/2021 12:53 PM Gomez Lowry [M54 12] Cervical radiculopathy       ED Disposition     ED Disposition Condition Date/Time Comment    Discharge Stable Sat Sep 25, 2021 12:51 PM Pavan Zayas discharge to home/self care  Follow-up Information     Follow up With Specialties Details Why Contact Info    Corinne Wilson MD Internal Medicine Schedule an appointment as soon as possible for a visit in 2 days For recheck of current symptoms [address]  [phone]            Discharge Medication List as of 9/25/2021 12:55 PM      CONTINUE these medications which have NOT CHANGED    Details   ! [facility]/ACT [facility] Inhale 2 puffs 2 (two) times a day Rinse mouth after use, Starting Wed 4/18/2018, Historical Med      albuterol (2 5 mg/3 mL) 0 083 % nebulizer solution Take 2 5 mg by nebulization every 6 (six) hours as needed for wheezing or shortness of breath, Historical Med      albuterol (PROVENTIL HFA,VENTOLIN HFA) 90 mcg/act inhaler albuterol sulfate HFA 90 mcg/actuation aerosol inhaler   INHALE 2 PUFFS EVERY 4 HOURS AS NEEDED  MAY USE 2 PUFFS 20-30 MINUTES BEFORE PHYSICAL EXERTION, Historical Med      amLODIPine (NORVASC) 5 mg tablet amlodipine 5 mg tablet   TAKE 1 TABLET BY MOUTH EVERY DAY IN THE EVENING, Historical Med      atorvastatin (LIPITOR) 40 mg tablet Take 1 tablet (40 mg total) by mouth every evening, Starting Fri 7/16/2021, Until Fri 9/10/2021, Normal      clopidogrel (PLAVIX) 75 mg tablet Take 1 tablet (75 mg total) by mouth daily, Starting Sat 7/17/2021, Until Fri 9/10/2021, Normal      !! fluticasone-salmeterol (Advair HFA) 115-21 MCG/ACT inhaler Advair  mcg-21 mcg/actuation aerosol inhaler   INHALE 2 PUFFS DAILY, USE REGUARLY  RINSE MOUTH AFTER USE, Historical Med      loratadine (CLARITIN) 10 mg tablet Take 10 mg by mouth daily, Historical Med      Synthroid 112 MCG tablet Take 1 tab daily Monday through Saturday and 1 5 tab sunday, Normal       !! - Potential duplicate medications found   Please discuss with provider  No discharge procedures on file      PDMP Review     None          ED Provider  Electronically Signed by           Alfonso Ribera DO  09/25/21 1199

## 2021-09-25 NOTE — DISCHARGE INSTRUCTIONS
It is recommended that you to wear your wrist splints at night to help with symptoms - it is possible that you are pain hand paresthesias secondary to carpal tunnel syndrome

## 2021-10-28 ENCOUNTER — TELEPHONE (OUTPATIENT)
Dept: OBGYN CLINIC | Facility: CLINIC | Age: 46
End: 2021-10-28

## 2021-11-13 ENCOUNTER — APPOINTMENT (OUTPATIENT)
Dept: LAB | Facility: MEDICAL CENTER | Age: 46
End: 2021-11-13
Payer: COMMERCIAL

## 2021-11-13 DIAGNOSIS — Z79.899 ENCOUNTER FOR LONG-TERM (CURRENT) USE OF OTHER MEDICATIONS: ICD-10-CM

## 2021-11-13 DIAGNOSIS — N91.2 AMENORRHEA: ICD-10-CM

## 2021-11-13 DIAGNOSIS — R79.89 ABNORMAL LFTS: ICD-10-CM

## 2021-11-13 DIAGNOSIS — M35.01 KERATOCONJUNCTIVITIS SICCA (HCC): ICD-10-CM

## 2021-11-13 DIAGNOSIS — E78.5 HYPERLIPIDEMIA, UNSPECIFIED HYPERLIPIDEMIA TYPE: ICD-10-CM

## 2021-11-13 LAB
ALBUMIN SERPL BCP-MCNC: 3.7 G/DL (ref 3.5–5)
ALP SERPL-CCNC: 66 U/L (ref 46–116)
ALT SERPL W P-5'-P-CCNC: 65 U/L (ref 12–78)
AST SERPL W P-5'-P-CCNC: 50 U/L (ref 5–45)
BASOPHILS # BLD AUTO: 0.03 THOUSANDS/ΜL (ref 0–0.1)
BASOPHILS NFR BLD AUTO: 1 % (ref 0–1)
BILIRUB DIRECT SERPL-MCNC: 0.18 MG/DL (ref 0–0.2)
BILIRUB SERPL-MCNC: 0.72 MG/DL (ref 0.2–1)
BUN SERPL-MCNC: 19 MG/DL (ref 5–25)
CHOLEST SERPL-MCNC: 123 MG/DL (ref 50–200)
CREAT SERPL-MCNC: 0.79 MG/DL (ref 0.6–1.3)
CRP SERPL QL: <3 MG/L
EOSINOPHIL # BLD AUTO: 0.13 THOUSAND/ΜL (ref 0–0.61)
EOSINOPHIL NFR BLD AUTO: 3 % (ref 0–6)
ERYTHROCYTE [DISTWIDTH] IN BLOOD BY AUTOMATED COUNT: 13.6 % (ref 11.6–15.1)
ERYTHROCYTE [SEDIMENTATION RATE] IN BLOOD: 14 MM/HOUR (ref 0–19)
FSH SERPL-ACNC: 60.5 MIU/ML
GFR SERPL CREATININE-BSD FRML MDRD: 89 ML/MIN/1.73SQ M
HAV IGM SER QL: NORMAL
HBV CORE IGM SER QL: NORMAL
HBV SURFACE AG SER QL: NORMAL
HCT VFR BLD AUTO: 38 % (ref 34.8–46.1)
HCV AB SER QL: NORMAL
HDLC SERPL-MCNC: 45 MG/DL
HGB BLD-MCNC: 11.5 G/DL (ref 11.5–15.4)
IMM GRANULOCYTES # BLD AUTO: 0.01 THOUSAND/UL (ref 0–0.2)
IMM GRANULOCYTES NFR BLD AUTO: 0 % (ref 0–2)
LDLC SERPL CALC-MCNC: 71 MG/DL (ref 0–100)
LYMPHOCYTES # BLD AUTO: 0.97 THOUSANDS/ΜL (ref 0.6–4.47)
LYMPHOCYTES NFR BLD AUTO: 23 % (ref 14–44)
MCH RBC QN AUTO: 26.6 PG (ref 26.8–34.3)
MCHC RBC AUTO-ENTMCNC: 30.3 G/DL (ref 31.4–37.4)
MCV RBC AUTO: 88 FL (ref 82–98)
MONOCYTES # BLD AUTO: 0.43 THOUSAND/ΜL (ref 0.17–1.22)
MONOCYTES NFR BLD AUTO: 10 % (ref 4–12)
NEUTROPHILS # BLD AUTO: 2.64 THOUSANDS/ΜL (ref 1.85–7.62)
NEUTS SEG NFR BLD AUTO: 63 % (ref 43–75)
NRBC BLD AUTO-RTO: 0 /100 WBCS
PLATELET # BLD AUTO: 246 THOUSANDS/UL (ref 149–390)
PMV BLD AUTO: 11.1 FL (ref 8.9–12.7)
PROT SERPL-MCNC: 8 G/DL (ref 6.4–8.2)
RBC # BLD AUTO: 4.32 MILLION/UL (ref 3.81–5.12)
TRIGL SERPL-MCNC: 36 MG/DL
WBC # BLD AUTO: 4.21 THOUSAND/UL (ref 4.31–10.16)

## 2021-11-13 PROCEDURE — 80076 HEPATIC FUNCTION PANEL: CPT

## 2021-11-13 PROCEDURE — 80074 ACUTE HEPATITIS PANEL: CPT

## 2021-11-13 PROCEDURE — 85652 RBC SED RATE AUTOMATED: CPT

## 2021-11-13 PROCEDURE — 85025 COMPLETE CBC W/AUTO DIFF WBC: CPT

## 2021-11-13 PROCEDURE — 80061 LIPID PANEL: CPT

## 2021-11-13 PROCEDURE — 84520 ASSAY OF UREA NITROGEN: CPT

## 2021-11-13 PROCEDURE — 86140 C-REACTIVE PROTEIN: CPT

## 2021-11-13 PROCEDURE — 83001 ASSAY OF GONADOTROPIN (FSH): CPT

## 2021-11-13 PROCEDURE — 82565 ASSAY OF CREATININE: CPT

## 2021-11-13 PROCEDURE — 36415 COLL VENOUS BLD VENIPUNCTURE: CPT

## 2021-12-06 DIAGNOSIS — E03.8 HYPOTHYROIDISM DUE TO HASHIMOTO'S THYROIDITIS: ICD-10-CM

## 2021-12-06 DIAGNOSIS — E06.3 HYPOTHYROIDISM DUE TO HASHIMOTO'S THYROIDITIS: ICD-10-CM

## 2021-12-06 RX ORDER — LEVOTHYROXINE SODIUM 112 MCG
TABLET ORAL
Qty: 100 TABLET | Refills: 1 | Status: SHIPPED | OUTPATIENT
Start: 2021-12-06 | End: 2022-06-06

## 2021-12-23 ENCOUNTER — HOSPITAL ENCOUNTER (OUTPATIENT)
Dept: NEUROLOGY | Facility: HOSPITAL | Age: 46
Discharge: HOME | End: 2021-12-23
Payer: COMMERCIAL

## 2021-12-23 DIAGNOSIS — R20.0 TACTILE ANESTHESIA: ICD-10-CM

## 2021-12-23 PROCEDURE — 95911 NRV CNDJ TEST 9-10 STUDIES: CPT | Performed by: PSYCHIATRY & NEUROLOGY

## 2021-12-23 PROCEDURE — 95886 MUSC TEST DONE W/N TEST COMP: CPT | Performed by: PSYCHIATRY & NEUROLOGY

## 2022-02-02 ENCOUNTER — TELEPHONE (OUTPATIENT)
Dept: ENDOCRINOLOGY | Facility: CLINIC | Age: 47
End: 2022-02-02

## 2022-02-02 NOTE — TELEPHONE ENCOUNTER
Pt called questioning if she needs to be fasting for bw pt having having tsh, t4 and vit d advised it was non fasting   Pt checked for earlier appointment but schedules are booked

## 2022-02-07 ENCOUNTER — OFFICE VISIT (OUTPATIENT)
Dept: ENDOCRINOLOGY | Facility: CLINIC | Age: 47
End: 2022-02-07
Payer: COMMERCIAL

## 2022-02-07 VITALS
DIASTOLIC BLOOD PRESSURE: 74 MMHG | SYSTOLIC BLOOD PRESSURE: 120 MMHG | BODY MASS INDEX: 34.93 KG/M2 | HEIGHT: 61 IN | WEIGHT: 185 LBS | TEMPERATURE: 98.8 F

## 2022-02-07 DIAGNOSIS — E55.9 VITAMIN D DEFICIENCY: ICD-10-CM

## 2022-02-07 DIAGNOSIS — E03.8 HYPOTHYROIDISM DUE TO HASHIMOTO'S THYROIDITIS: Primary | ICD-10-CM

## 2022-02-07 DIAGNOSIS — E06.3 HYPOTHYROIDISM DUE TO HASHIMOTO'S THYROIDITIS: Primary | ICD-10-CM

## 2022-02-07 PROCEDURE — 99214 OFFICE O/P EST MOD 30 MIN: CPT | Performed by: PHYSICIAN ASSISTANT

## 2022-02-07 RX ORDER — VALSARTAN AND HYDROCHLOROTHIAZIDE 160; 12.5 MG/1; MG/1
1 TABLET, FILM COATED ORAL DAILY
COMMUNITY

## 2022-02-07 NOTE — PROGRESS NOTES
Patient Progress Note    CC: hypothyroidism     Referring Provider  No referring provider defined for this encounter  History of Present Illness:     Patient is a 64-year-old female here for follow-up of hypothyroidism secondary to Hashimoto's thyroiditis  She has a history of positive TPO antibodies  Patient is on Synthroid 112 mcg 1 tablet daily Monday through Saturday and 1 5 tablets on   Patient is taking it 1 hr before breakfast on an empty stomach and at least 4 hrs apart from supplements  Tolerating medication well  No history of external radiation to head/neck/chest   No family history of thyroid cancer  No recent Iodine loading in form of medication, iodine or kelp supplements or radiological diagnostic studies  Most recent thyroid labs were within normal range, TSH 1 75 and free T4 1  14  Vitamin-D deficiency:  Vitamin-D low at 25  She is not using vitamin D recently because it constipates her       Patient Active Problem List   Diagnosis    Hypothyroidism due to Hashimoto's thyroiditis    Vitamin D deficiency    [facility]    Asthma    Sjogren's syndrome ([facility]    Class 1 obesity in adult    Endometriosis    Hypertensive urgency    Bilateral carpal tunnel syndrome     Past Medical History:   Diagnosis Date    Disease of thyroid gland     Endometriosis     Sjogren's syndrome ([facility] [facility]       Past Surgical History:   Procedure Laterality Date     SECTION      WISDOM TOOTH EXTRACTION        Family History   Problem Relation Age of Onset    Heart disease Mother     Diabetes Mother     Hypertension Mother     Heart disease Father     No Known Problems Sister     No Known Problems Daughter     No Known Problems Maternal Aunt     No Known Problems Paternal Aunt     No Known Problems Daughter     Cancer Maternal Grandmother     No Known Problems Maternal Grandfather     No Known Problems Paternal Grandmother     No Known Problems Paternal Grandfather      Social History     Tobacco Use    Smoking status: Never Smoker    Smokeless tobacco: Never Used   Substance Use Topics    Alcohol use: Not Currently     Comment: social     Allergies   Allergen Reactions    Asa [Aspirin]     Avelox [Moxifloxacin]      Current Outpatient Medications   Medication Sig Dispense Refill    ADVAIR -21 MCG/ACT inhaler Inhale 2 puffs 2 (two) times a day Rinse mouth after use  2    albuterol (2 5 mg/3 mL) 0 083 % nebulizer solution Take 2 5 mg by nebulization every 6 (six) hours as needed for wheezing or shortness of breath      albuterol (PROVENTIL HFA,VENTOLIN HFA) 90 mcg/act inhaler albuterol sulfate HFA 90 mcg/actuation aerosol inhaler   INHALE 2 PUFFS EVERY 4 HOURS AS NEEDED  MAY USE 2 PUFFS 20-30 MINUTES BEFORE PHYSICAL EXERTION      atorvastatin (LIPITOR) 40 mg tablet Take 1 tablet (40 mg total) by mouth every evening 30 tablet 0    clopidogrel (PLAVIX) 75 mg tablet Take 1 tablet (75 mg total) by mouth daily 30 tablet 0    fluticasone-salmeterol (Advair HFA) 115-21 MCG/ACT inhaler Advair  mcg-21 mcg/actuation aerosol inhaler   INHALE 2 PUFFS DAILY, USE REGUARLY  RINSE MOUTH AFTER USE      loratadine (CLARITIN) 10 mg tablet Take 10 mg by mouth daily      Synthroid 112 MCG tablet Take 1 tab daily Monday through Saturday and 1 5 tab sunday 100 tablet 1    valsartan-hydrochlorothiazide (DIOVAN-HCT) 160-12 5 MG per tablet Take 1 tablet by mouth daily      amLODIPine (NORVASC) 5 mg tablet amlodipine 5 mg tablet   TAKE 1 TABLET BY MOUTH EVERY DAY IN THE EVENING       No current facility-administered medications for this visit  Review of Systems   Constitutional: Positive for fatigue (she is getting a sleep study )  Negative for activity change, appetite change and unexpected weight change  HENT: Negative for trouble swallowing  Eyes: Negative for visual disturbance  Respiratory: Negative for shortness of breath      Cardiovascular: Negative for chest pain and palpitations  Gastrointestinal: Negative for constipation and diarrhea  Endocrine: Negative for cold intolerance and heat intolerance  Musculoskeletal: Positive for arthralgias  Skin: Negative  Neurological: Negative for tremors  Psychiatric/Behavioral: Negative  Physical Exam:  Body mass index is 34 96 kg/m²  /74   Temp 98 8 °F (37 1 °C) (Tympanic)   Ht 5' 1" (1 549 m)   Wt 83 9 kg (185 lb)   LMP 07/16/2021 (Exact Date)   BMI 34 96 kg/m²    Wt Readings from Last 3 Encounters:   02/07/22 83 9 kg (185 lb)   09/25/21 86 kg (189 lb 9 5 oz)   09/10/21 87 5 kg (193 lb)       Physical Exam  Vitals and nursing note reviewed  Constitutional:       Appearance: She is well-developed  HENT:      Head: Normocephalic  Eyes:      General: No scleral icterus  Pupils: Pupils are equal, round, and reactive to light  Neck:      Thyroid: No thyromegaly  Cardiovascular:      Rate and Rhythm: Normal rate and regular rhythm  Pulses:           Radial pulses are 2+ on the right side and 2+ on the left side  Heart sounds: No murmur heard  Pulmonary:      Effort: Pulmonary effort is normal  No respiratory distress  Breath sounds: Normal breath sounds  No wheezing  Musculoskeletal:      Cervical back: Neck supple  Skin:     General: Skin is warm and dry  Neurological:      Mental Status: She is alert  Deep Tendon Reflexes: Reflexes are normal and symmetric  Patient's shoes and socks were not removed            Labs:   Lab Results   Component Value Date    HGBA1C 5 2 07/16/2021       Lab Results   Component Value Date    CHOL 198 07/13/2015    HDL 45 11/13/2021    TRIG 36 11/13/2021       Lab Results   Component Value Date    GLUCOSE 82 07/13/2015    CALCIUM 8 6 09/25/2021     07/13/2015    K 4 0 09/25/2021    CO2 25 09/25/2021     09/25/2021    BUN 19 11/13/2021    CREATININE 0 79 11/13/2021        eGFR   Date Value Ref Range Status   11/13/2021 89 ml/min/1 73sq m Final       Lab Results   Component Value Date    ALT 65 11/13/2021    AST 50 (H) 11/13/2021    ALKPHOS 66 11/13/2021    BILITOT 0 69 07/13/2015       Lab Results   Component Value Date    [id number] 0 796 09/25/2021       Plan:    Diagnoses and all orders for this visit:    Hypothyroidism due to Hashimoto's thyroiditis  TSH 1 75 and free T4 1 14  Continue current dose of Synthroid   Monitor labs   -     T4, free; Future  -     TSH, 3rd generation; Future    Vitamin D deficiency  Vitamin D low at 25   Suggested taking vitamin D3 drops 2000 IU daily  -     Vitamin D 25 hydroxy; Future      Discussed with the patient and all questions fully answered  She will call me if any problems arise      Counseled patient on diagnostic results, prognosis, risk and benefit of treatment options, instruction for management, importance of treatment compliance, risk  factor reduction and impressions      Codi Nagel PA-C

## 2022-06-04 DIAGNOSIS — E03.8 HYPOTHYROIDISM DUE TO HASHIMOTO'S THYROIDITIS: ICD-10-CM

## 2022-06-04 DIAGNOSIS — E06.3 HYPOTHYROIDISM DUE TO HASHIMOTO'S THYROIDITIS: ICD-10-CM

## 2022-06-06 RX ORDER — LEVOTHYROXINE SODIUM 112 MCG
TABLET ORAL
Qty: 100 TABLET | Refills: 1 | Status: SHIPPED | OUTPATIENT
Start: 2022-06-06

## 2022-07-06 ENCOUNTER — ANNUAL EXAM (OUTPATIENT)
Dept: OBGYN CLINIC | Facility: CLINIC | Age: 47
End: 2022-07-06
Payer: COMMERCIAL

## 2022-07-06 VITALS
HEIGHT: 61 IN | BODY MASS INDEX: 35.3 KG/M2 | SYSTOLIC BLOOD PRESSURE: 124 MMHG | DIASTOLIC BLOOD PRESSURE: 60 MMHG | WEIGHT: 187 LBS

## 2022-07-06 DIAGNOSIS — Z12.39 ENCOUNTER FOR SCREENING BREAST EXAMINATION: ICD-10-CM

## 2022-07-06 DIAGNOSIS — E03.8 HYPOTHYROIDISM DUE TO HASHIMOTO'S THYROIDITIS: ICD-10-CM

## 2022-07-06 DIAGNOSIS — Z01.419 ENCOUNTER FOR WELL WOMAN EXAM: Primary | ICD-10-CM

## 2022-07-06 DIAGNOSIS — E06.3 HYPOTHYROIDISM DUE TO HASHIMOTO'S THYROIDITIS: ICD-10-CM

## 2022-07-06 DIAGNOSIS — Z12.31 ENCOUNTER FOR SCREENING MAMMOGRAM FOR BREAST CANCER: ICD-10-CM

## 2022-07-06 DIAGNOSIS — I63.9 CEREBROVASCULAR ACCIDENT (CVA), UNSPECIFIED MECHANISM (HCC): ICD-10-CM

## 2022-07-06 PROCEDURE — S0612 ANNUAL GYNECOLOGICAL EXAMINA: HCPCS | Performed by: PHYSICIAN ASSISTANT

## 2022-07-07 NOTE — PROGRESS NOTES
Assessment/Plan:    No problem-specific Assessment & Plan notes found for this encounter  Diagnoses and all orders for this visit:    Encounter for well woman exam    Encounter for screening breast examination    Encounter for screening mammogram for breast cancer  -     Mammo screening bilateral w 3d & cad; Future    Hypothyroidism due to Hashimoto's thyroiditis    Cerebrovascular accident (CVA), unspecified mechanism ([facility] [address] )    Other orders  -     Pediatric Multiple Vitamins (PC PEDIATRIC POLY-VITAMIN DROP PO); Take 1 drop by mouth in the morning          Subjective:      Patient ID: jP Heath is a 48 y o  female  Pt presents for her annual exam today--  She has no complaints except some irregular, light bleeding  CVA in 9/21  She has no pelvic pain  Bowel and bladder are regular  No breast concerns today  Last mammo--7/21      No pap today  rx mammo  Daily ca, d  The following portions of the patient's history were reviewed and updated as appropriate: allergies, current medications, past family history, past medical history, past social history, past surgical history and problem list     Review of Systems   Constitutional: Negative for chills, fever and unexpected weight change  Gastrointestinal: Negative for abdominal pain, blood in stool, constipation and diarrhea  Genitourinary: Negative  Objective:      /60   Ht 5' 1" (1 549 m)   Wt 84 8 kg (187 lb)   LMP 07/16/2021 (Exact Date)   BMI 35 33 kg/m²          Physical Exam  Vitals and nursing note reviewed  Constitutional:       Appearance: She is well-developed  HENT:      Head: Normocephalic and atraumatic  Chest:   Breasts:      Right: No inverted nipple, mass, nipple discharge or skin change  Left: No inverted nipple, mass, nipple discharge or skin change  Abdominal:      Palpations: Abdomen is soft  Genitourinary:     Exam position: Supine        Labia:         Right: No rash, tenderness or lesion  Left: No rash, tenderness or lesion  Vagina: Normal       Cervix: No cervical motion tenderness, discharge or friability  Adnexa:         Right: No mass, tenderness or fullness  Left: No mass, tenderness or fullness  Musculoskeletal:      Cervical back: Normal range of motion  Lymphadenopathy:      Lower Body: No right inguinal adenopathy  No left inguinal adenopathy

## 2022-07-15 ENCOUNTER — TELEPHONE (OUTPATIENT)
Dept: FAMILY MEDICINE CLINIC | Facility: CLINIC | Age: 47
End: 2022-07-15

## 2022-07-15 NOTE — TELEPHONE ENCOUNTER
Patient left voicemail on Teams asking office to call her back to reschedule appt      LMOM for patient to call office and reschedule her follow up appt

## 2022-07-25 ENCOUNTER — HOSPITAL ENCOUNTER (OUTPATIENT)
Dept: RADIOLOGY | Facility: MEDICAL CENTER | Age: 47
Discharge: HOME | End: 2022-07-25
Payer: COMMERCIAL

## 2022-07-25 DIAGNOSIS — Z12.31 SCREENING MAMMOGRAM FOR HIGH-RISK PATIENT: ICD-10-CM

## 2022-07-25 DIAGNOSIS — Z12.31 ENCOUNTER FOR SCREENING MAMMOGRAM FOR MALIGNANT NEOPLASM OF BREAST: ICD-10-CM

## 2022-07-25 PROCEDURE — 77063 BREAST TOMOSYNTHESIS BI: CPT

## 2022-07-25 PROCEDURE — 77067 SCR MAMMO BI INCL CAD: CPT

## 2022-08-04 ENCOUNTER — OFFICE VISIT (OUTPATIENT)
Dept: ENDOCRINOLOGY | Facility: CLINIC | Age: 47
End: 2022-08-04
Payer: COMMERCIAL

## 2022-08-04 VITALS
SYSTOLIC BLOOD PRESSURE: 112 MMHG | BODY MASS INDEX: 36.09 KG/M2 | DIASTOLIC BLOOD PRESSURE: 64 MMHG | WEIGHT: 191 LBS | HEART RATE: 72 BPM

## 2022-08-04 DIAGNOSIS — E03.8 HYPOTHYROIDISM DUE TO HASHIMOTO'S THYROIDITIS: Primary | ICD-10-CM

## 2022-08-04 DIAGNOSIS — E06.3 HYPOTHYROIDISM DUE TO HASHIMOTO'S THYROIDITIS: Primary | ICD-10-CM

## 2022-08-04 DIAGNOSIS — E55.9 VITAMIN D DEFICIENCY: ICD-10-CM

## 2022-08-04 DIAGNOSIS — E66.9 OBESITY (BMI 30-39.9): ICD-10-CM

## 2022-08-04 PROCEDURE — 99214 OFFICE O/P EST MOD 30 MIN: CPT | Performed by: PHYSICIAN ASSISTANT

## 2022-08-04 NOTE — PROGRESS NOTES
Patient Progress Note    CC: hypothyroidism, vitamin D deficiency  Referring Provider  No referring provider defined for this encounter  History of Present Illness:     Patient is a 40-year-old female here for follow-up of hypothyroidism secondary to Hashimoto's thyroiditis  She has a history of positive TPO antibodies  Patient is on Synthroid 112 mcg 1 tablet daily Monday through Saturday and 1 5 tablets on   Patient is taking it 1 hr before breakfast on an empty stomach and at least 4 hrs apart from supplements  Tolerating medication well  No history of external radiation to head/neck/chest   No family history of thyroid cancer  No recent Iodine loading in form of medication, iodine or kelp supplements or radiological diagnostic studies  Most recent thyroid labs were within normal range, TSH 0 58 and free T4 1  19  Vitamin-D deficiency:  Vitamin-D normal at 36  Previously did not take supplement as it constipated her  She is now using vitamin D drops  She's states she tries to walk 2 miles a day  She does try to eat healthy but with a busy schedule its hard to make good choices all the time  Patient Active Problem List   Diagnosis    Hypothyroidism due to Hashimoto's thyroiditis    Vitamin D deficiency    [facility]    Asthma    [facility]jogren's syndrome ([facility]    Obesity (BMI 30-39  9)    Endometriosis    Hypertensive urgency    Bilateral carpal tunnel syndrome     Past Medical History:   Diagnosis Date    Disease of thyroid gland     Endometriosis     Sjogren's syndrome ([facility]       Past Surgical History:   Procedure Laterality Date     SECTION      WISDOM TOOTH EXTRACTION        Family History   Problem Relation Age of Onset    Heart disease Mother     Diabetes Mother     Hypertension Mother     Heart disease Father     No Known Problems Sister     No Known Problems Daughter     No Known Problems Maternal Aunt     No Known Problems Paternal Aunt     No Known Problems Daughter     Cancer Maternal Grandmother     No Known Problems Maternal Grandfather     No Known Problems Paternal Grandmother     No Known Problems Paternal Grandfather      Social History     Tobacco Use    Smoking status: Never Smoker    Smokeless tobacco: Never Used   Substance Use Topics    Alcohol use: Not Currently     Allergies   Allergen Reactions    Asa [Aspirin]     Avelox [Moxifloxacin]      Current Outpatient Medications   Medication Sig Dispense Refill    ADVAIR -21 MCG/ACT inhaler Inhale 2 puffs 2 (two) times a day Rinse mouth after use  2    albuterol (2 5 mg/3 mL) 0 083 % nebulizer solution Take 2 5 mg by nebulization every 6 (six) hours as needed for wheezing or shortness of breath      albuterol (PROVENTIL HFA,VENTOLIN HFA) 90 mcg/act inhaler albuterol sulfate HFA 90 mcg/actuation aerosol inhaler   INHALE 2 PUFFS EVERY 4 HOURS AS NEEDED  MAY USE 2 PUFFS 20-30 MINUTES BEFORE PHYSICAL EXERTION      atorvastatin (LIPITOR) 40 mg tablet Take 1 tablet (40 mg total) by mouth every evening 30 tablet 0    clopidogrel (PLAVIX) 75 mg tablet Take 1 tablet (75 mg total) by mouth daily 30 tablet 0    loratadine (CLARITIN) 10 mg tablet Take 10 mg by mouth daily      Pediatric Multiple Vitamins (PC PEDIATRIC POLY-VITAMIN DROP PO) Take 1 drop by mouth in the morning      Synthroid 112 MCG tablet TAKE 1 TABLET DAILY MONDAY THROUGH SATURDAY AND 1 & 1/2 TABLETS SUNDAY FOR HYPOTHYROIDISM 100 tablet 1    valsartan-hydrochlorothiazide (DIOVAN-HCT) 160-12 5 MG per tablet Take 1 tablet by mouth daily      amLODIPine (NORVASC) 5 mg tablet amlodipine 5 mg tablet   TAKE 1 TABLET BY MOUTH EVERY DAY IN THE EVENING      fluticasone-salmeterol (Advair HFA) 115-21 MCG/ACT inhaler Advair  mcg-21 mcg/actuation aerosol inhaler   INHALE 2 PUFFS DAILY, USE REGUARLY  RINSE MOUTH AFTER USE       No current facility-administered medications for this visit           Review of Systems Constitutional: Positive for fatigue (states she has been very busy lately  She is caring for her parents  )  Negative for activity change, appetite change and unexpected weight change  HENT: Negative for trouble swallowing  Eyes: Negative for visual disturbance  Respiratory: Negative for shortness of breath  Cardiovascular: Negative for chest pain and palpitations  Gastrointestinal: Negative for constipation and diarrhea  Endocrine: Negative for cold intolerance and heat intolerance  Musculoskeletal: Positive for arthralgias (mild)  Skin: Negative  Neurological: Negative for tremors  Psychiatric/Behavioral: Negative  Physical Exam:  Body mass index is 36 09 kg/m²  /64   Pulse 72   Wt 86 6 kg (191 lb)   LMP 07/16/2021 (Exact Date)   BMI 36 09 kg/m²    Wt Readings from Last 3 Encounters:   08/04/22 86 6 kg (191 lb)   07/06/22 84 8 kg (187 lb)   02/07/22 83 9 kg (185 lb)       Physical Exam  Vitals and nursing note reviewed  Constitutional:       Appearance: She is well-developed  HENT:      Head: Normocephalic  Eyes:      General: No scleral icterus  Pupils: Pupils are equal, round, and reactive to light  Neck:      Thyroid: No thyromegaly  Cardiovascular:      Rate and Rhythm: Normal rate and regular rhythm  Pulses:           Radial pulses are 2+ on the right side and 2+ on the left side  Heart sounds: No murmur heard  Pulmonary:      Effort: Pulmonary effort is normal  No respiratory distress  Breath sounds: Normal breath sounds  No wheezing  Musculoskeletal:      Cervical back: Neck supple  Skin:     General: Skin is warm and dry  Neurological:      Mental Status: She is alert  Deep Tendon Reflexes: Reflexes are normal and symmetric  Patient's shoes and socks were not removed            Labs:   Lab Results   Component Value Date    HGBA1C 5 2 07/16/2021       Lab Results   Component Value Date    CHOL 198 07/13/2015    HDL 45 11/13/2021    TRIG 36 11/13/2021       Lab Results   Component Value Date    GLUCOSE 82 07/13/2015    CALCIUM 8 6 09/25/2021     07/13/2015    K 4 0 09/25/2021    CO2 25 09/25/2021     09/25/2021    BUN 19 11/13/2021    CREATININE 0 79 11/13/2021        eGFR   Date Value Ref Range Status   11/13/2021 89 ml/min/1 73sq m Final       Lab Results   Component Value Date    ALT 65 11/13/2021    AST 50 (H) 11/13/2021    ALKPHOS 66 11/13/2021    BILITOT 0 69 07/13/2015       Lab Results   Component Value Date    [id number] 0 796 09/25/2021         Plan:    Diagnoses and all orders for this visit:    Hypothyroidism due to Hashimoto's thyroiditis  TSH 0 58 and free T4 1  19  Continue current dose of levothyroxine  Monitor labs   -     T4, free; Future  -     TSH, 3rd generation; Future    Vitamin D deficiency  Vitamin-D normal at 39  Continue current supplementation  -     Vitamin D 25 hydroxy; Future    Obesity (BMI 30-39  9)  Recommended healthy diet and routine exercise as tolerated          Discussed with the patient and all questions fully answered  She will call me if any problems arise      Counseled patient on diagnostic results, prognosis, risk and benefit of treatment options, instruction for management, importance of treatment compliance, risk  factor reduction and impressions      Codi Nagel PA-C

## 2022-08-10 ENCOUNTER — VBI (OUTPATIENT)
Dept: ADMINISTRATIVE | Facility: OTHER | Age: 47
End: 2022-08-10

## 2022-08-10 NOTE — TELEPHONE ENCOUNTER
Upon review of the In Basket request we were able to locate, review, and update the patient chart as requested for Urine Microalbumin  Any additional questions or concerns should be emailed to the Practice Liaisons via [email or web address] email, please do not reply via In Basket      Thank you  Gela Chester

## 2022-08-11 ENCOUNTER — RA CDI HCC (OUTPATIENT)
Dept: OTHER | Facility: HOSPITAL | Age: 47
End: 2022-08-11

## 2022-08-11 NOTE — PROGRESS NOTES
[facility]  coding opportunities          Chart Reviewed number of suggestions sent to Provider: 1   [phone]    Patients Insurance        Commercial Insurance: [address] [place] [address]

## 2022-08-18 ENCOUNTER — OFFICE VISIT (OUTPATIENT)
Dept: FAMILY MEDICINE CLINIC | Facility: CLINIC | Age: 47
End: 2022-08-18
Payer: COMMERCIAL

## 2022-08-18 VITALS
HEART RATE: 81 BPM | SYSTOLIC BLOOD PRESSURE: 100 MMHG | TEMPERATURE: 97.4 F | DIASTOLIC BLOOD PRESSURE: 58 MMHG | WEIGHT: 189.6 LBS | BODY MASS INDEX: 31.59 KG/M2 | OXYGEN SATURATION: 98 % | HEIGHT: 65 IN | RESPIRATION RATE: 18 BRPM

## 2022-08-18 DIAGNOSIS — I10 BENIGN ESSENTIAL HYPERTENSION: Primary | ICD-10-CM

## 2022-08-18 DIAGNOSIS — E78.2 MIXED HYPERLIPIDEMIA: ICD-10-CM

## 2022-08-18 DIAGNOSIS — E03.4 IDIOPATHIC ATROPHIC HYPOTHYROIDISM: ICD-10-CM

## 2022-08-18 DIAGNOSIS — I63.9 STROKE (HCC): ICD-10-CM

## 2022-08-18 DIAGNOSIS — J45.20 MILD INTERMITTENT ASTHMA WITHOUT COMPLICATION: ICD-10-CM

## 2022-08-18 PROBLEM — Z88.8 ASPIRIN ALLERGY: Status: ACTIVE | Noted: 2021-10-13

## 2022-08-18 PROBLEM — Z95.818 STATUS POST PLACEMENT OF IMPLANTABLE LOOP RECORDER: Status: ACTIVE | Noted: 2021-10-28

## 2022-08-18 PROBLEM — I16.0 HYPERTENSIVE URGENCY: Status: RESOLVED | Noted: 2021-07-16 | Resolved: 2022-08-18

## 2022-08-18 PROBLEM — E03.8 HYPOTHYROIDISM DUE TO HASHIMOTO'S THYROIDITIS: Status: RESOLVED | Noted: 2020-12-29 | Resolved: 2022-08-18

## 2022-08-18 PROBLEM — E06.3 HYPOTHYROIDISM DUE TO HASHIMOTO'S THYROIDITIS: Status: RESOLVED | Noted: 2020-12-29 | Resolved: 2022-08-18

## 2022-08-18 PROCEDURE — 99213 OFFICE O/P EST LOW 20 MIN: CPT

## 2022-08-18 RX ORDER — MOMETASONE FUROATE 100 UG/1
100 AEROSOL RESPIRATORY (INHALATION) 2 TIMES DAILY
COMMUNITY
Start: 2022-04-22

## 2022-08-18 RX ORDER — COVID-19 MOLECULAR TEST ASSAY
KIT MISCELLANEOUS
COMMUNITY
Start: 2022-05-15 | End: 2022-08-18

## 2022-08-18 RX ORDER — CLOPIDOGREL BISULFATE 75 MG/1
TABLET ORAL
Qty: 90 TABLET | Refills: 0 | Status: SHIPPED | OUTPATIENT
Start: 2022-08-18

## 2022-08-18 RX ORDER — MOMETASONE FUROATE 100 UG/1
AEROSOL RESPIRATORY (INHALATION)
COMMUNITY
End: 2022-08-18 | Stop reason: SDUPTHER

## 2022-08-18 NOTE — TELEPHONE ENCOUNTER
Requested medication(s) are due for refill today: Yes  Patient has already received a courtesy refill: No  Other reason request has been forwarded to provider: protocol failed

## 2022-08-18 NOTE — PROGRESS NOTES
Assessment/Plan:         Problem List Items Addressed This Visit        Endocrine    Idiopathic atrophic hypothyroidism     Under control  Continue current medication  We will re-evaluate at next office visit  Respiratory    Asthma     Under control  Continue current medication  We will re-evaluate at next office visit  Relevant Medications    Mometasone Furoate (Asmanex HFA) 100 MCG/ACT AERO       Cardiovascular and Mediastinum    Benign essential hypertension - Primary     Under control  Continue current medication  We will re-evaluate at next office visit  Other    Mixed hyperlipidemia     Under control  Continue current medication  We will re-evaluate at next office visit  Other Visit Diagnoses     BMI 31 0-31 9,adult                Subjective:      Patient ID: Margarito Mackey is a 48 y o  female  Patient here for review of chronic medical problems and review of the labs and imaging if it is applicable  Currently has no specific complaints other than mentioned in the review of systems  Denies chest pain, SOB, cough, abdominal pain, nausea, vomiting, fever, chills, lightheadedness, dizziness,headache, tingling or numbness  No bowel or bladder problem  The following portions of the patient's history were reviewed and updated as appropriate:   Past Medical History:  She has a past medical history of Allergic, Asthma, Disease of thyroid gland, Endometriosis, High cholesterol, Hypertension (7/15/2021), Hypothyroidism, Obesity, Sjogren's syndrome ([facility] [address] ), and Stroke ([facility] [address] )  ,  _______________________________________________________________________  Medical Problems:  does not have any pertinent problems on file ,  _______________________________________________________________________  Past Surgical History:   has a past surgical history that includes  section; [place] tooth extraction;  Hemorroidectomy; and Mammo (historical) (Bilateral, 06/02/2017)  ,  _______________________________________________________________________  Family History:  family history includes Asthma in her mother; Bipolar disorder in her sister; Cancer in her maternal grandmother; Depression in her sister; Diabetes in her mother; Drug abuse in her sister; Heart disease in her father and mother; Hypertension in her father and mother; Mental illness in her sister; No Known Problems in her daughter, daughter, maternal aunt, paternal aunt, paternal grandfather, and paternal grandmother; Stroke in her maternal grandfather and maternal grandmother ,  _______________________________________________________________________  Social History:   reports that she has never smoked  She has never used smokeless tobacco  She reports previous alcohol use  She reports that she does not use drugs  ,  _______________________________________________________________________  Allergies:  is allergic to asa [aspirin] and avelox [moxifloxacin]     _______________________________________________________________________  Current Outpatient Medications   Medication Sig Dispense Refill    albuterol (PROVENTIL HFA,VENTOLIN HFA) 90 mcg/act inhaler albuterol sulfate HFA 90 mcg/actuation aerosol inhaler   INHALE 2 PUFFS EVERY 4 HOURS AS NEEDED  MAY USE 2 PUFFS 20-30 MINUTES BEFORE PHYSICAL EXERTION      clopidogrel (PLAVIX) 75 mg tablet TAKE 1 TABLET BY MOUTH EVERY DAY 90 tablet 0    loratadine (CLARITIN) 10 mg tablet Take 10 mg by mouth daily      Mometasone Furoate (Asmanex HFA) 100 MCG/ACT AERO Take 100 mcg by mouth 2 (two) times a day      Pediatric Multiple Vitamins (PC PEDIATRIC POLY-VITAMIN DROP PO) Take 1 drop by mouth in the morning      Synthroid 112 MCG tablet TAKE 1 TABLET DAILY MONDAY THROUGH SATURDAY AND 1 & 1/2 TABLETS SUNDAY FOR HYPOTHYROIDISM 100 tablet 1    valsartan-hydrochlorothiazide (DIOVAN-HCT) 160-12 5 MG per tablet Take 1 tablet by mouth daily      atorvastatin (LIPITOR) 40 mg tablet Take 1 tablet (40 mg total) by mouth every evening 30 tablet 0     No current facility-administered medications for this visit      _______________________________________________________________________  Review of Systems   Constitutional: Negative for chills, fatigue and fever  HENT: Negative for congestion, ear pain, rhinorrhea, sneezing and sore throat  Eyes: Negative for pain, redness and visual disturbance  Respiratory: Negative for cough, chest tightness and shortness of breath  Cardiovascular: Negative for chest pain, palpitations and leg swelling  Gastrointestinal: Negative for abdominal pain, blood in stool, diarrhea, nausea and vomiting  Endocrine: Negative for cold intolerance and heat intolerance  Genitourinary: Negative for dysuria, frequency and hematuria  Musculoskeletal: Positive for arthralgias (left foot)  Negative for back pain  Skin: Negative for color change and rash  Neurological: Negative for dizziness, weakness, light-headedness, numbness and headaches  Hematological: Does not bruise/bleed easily  Psychiatric/Behavioral: Negative for behavioral problems and confusion  Objective:  Vitals:    08/18/22 1141   BP: 100/58   BP Location: Left arm   Patient Position: Sitting   Cuff Size: Standard   Pulse: 81   Resp: 18   Temp: (!) 97 4 °F (36 3 °C)   TempSrc: Tympanic   SpO2: 98%   Weight: 86 kg (189 lb 9 6 oz)   Height: 5' 5" (1 651 m)     Body mass index is 31 55 kg/m²  Physical Exam  Constitutional:       General: She is not in acute distress  Appearance: Normal appearance  She is not ill-appearing, toxic-appearing or diaphoretic  HENT:      Head: Normocephalic and atraumatic  Right Ear: Tympanic membrane normal       Left Ear: Tympanic membrane normal       Nose: Nose normal  No congestion  Mouth/Throat:      Mouth: Mucous membranes are moist    Eyes:      General: No scleral icterus  Right eye: No discharge  Left eye: No discharge  Extraocular Movements: Extraocular movements intact  Conjunctiva/sclera: Conjunctivae normal       Pupils: Pupils are equal, round, and reactive to light  Cardiovascular:      Rate and Rhythm: Normal rate and regular rhythm  Pulses: Normal pulses  Heart sounds: Normal heart sounds  No murmur heard  No gallop  Pulmonary:      Effort: Pulmonary effort is normal  No respiratory distress  Breath sounds: Normal breath sounds  No wheezing, rhonchi or rales  Abdominal:      General: Abdomen is flat  Bowel sounds are normal  There is no distension  Palpations: Abdomen is soft  Tenderness: There is no abdominal tenderness  There is no guarding  Musculoskeletal:         General: Tenderness (left heel) present  No swelling  Normal range of motion  Cervical back: Normal range of motion and neck supple  No rigidity  Lymphadenopathy:      Cervical: No cervical adenopathy  Skin:     General: Skin is warm  Capillary Refill: Capillary refill takes 2 to 3 seconds  Coloration: Skin is not jaundiced  Findings: No bruising or rash  Neurological:      General: No focal deficit present  Mental Status: She is alert and oriented to person, place, and time  Mental status is at baseline  Gait: Gait normal    Psychiatric:         Mood and Affect: Mood normal        BMI Counseling: Body mass index is 31 55 kg/m²  The BMI is above normal  Nutrition recommendations include decreasing portion sizes, decreasing fast food intake, limiting drinks that contain sugar, moderation in carbohydrate intake, increasing intake of lean protein and reducing intake of saturated and trans fat  Exercise recommendations include vigorous physical activity 75 minutes/week  No pharmacotherapy was ordered  Rationale for BMI follow-up plan is due to patient being overweight or obese

## 2022-09-29 DIAGNOSIS — I63.9 STROKE (HCC): ICD-10-CM

## 2022-09-29 RX ORDER — ATORVASTATIN CALCIUM 40 MG/1
TABLET, FILM COATED ORAL
Qty: 90 TABLET | Refills: 0 | Status: SHIPPED | OUTPATIENT
Start: 2022-09-29

## 2022-10-05 DIAGNOSIS — I10 BENIGN ESSENTIAL HYPERTENSION: Primary | ICD-10-CM

## 2022-10-05 RX ORDER — VALSARTAN AND HYDROCHLOROTHIAZIDE 160; 12.5 MG/1; MG/1
1 TABLET, FILM COATED ORAL DAILY
Qty: 90 TABLET | Refills: 0 | Status: SHIPPED | OUTPATIENT
Start: 2022-10-05 | End: 2023-01-03

## 2022-10-05 NOTE — TELEPHONE ENCOUNTER
Received fax from [facility] in [place] requesting a refill on Valsartan-HCTz, medication sent to [address] [place] [address]

## 2022-11-26 DIAGNOSIS — I63.9 STROKE (HCC): ICD-10-CM

## 2022-11-28 RX ORDER — CLOPIDOGREL BISULFATE 75 MG/1
TABLET ORAL
Qty: 90 TABLET | Refills: 0 | Status: SHIPPED | OUTPATIENT
Start: 2022-11-28

## 2022-12-01 ENCOUNTER — OFFICE VISIT (OUTPATIENT)
Dept: FAMILY MEDICINE CLINIC | Facility: CLINIC | Age: 47
End: 2022-12-01

## 2022-12-01 VITALS
TEMPERATURE: 98.7 F | HEIGHT: 65 IN | OXYGEN SATURATION: 98 % | DIASTOLIC BLOOD PRESSURE: 70 MMHG | BODY MASS INDEX: 31.02 KG/M2 | RESPIRATION RATE: 16 BRPM | SYSTOLIC BLOOD PRESSURE: 120 MMHG | WEIGHT: 186.2 LBS | HEART RATE: 68 BPM

## 2022-12-01 DIAGNOSIS — M35.01 SJOGREN'S SYNDROME WITH KERATOCONJUNCTIVITIS SICCA (HCC): ICD-10-CM

## 2022-12-01 DIAGNOSIS — E78.2 MIXED HYPERLIPIDEMIA: ICD-10-CM

## 2022-12-01 DIAGNOSIS — I10 BENIGN ESSENTIAL HYPERTENSION: ICD-10-CM

## 2022-12-01 DIAGNOSIS — J45.20 MILD INTERMITTENT ASTHMA WITHOUT COMPLICATION: ICD-10-CM

## 2022-12-01 DIAGNOSIS — E03.4 IDIOPATHIC ATROPHIC HYPOTHYROIDISM: ICD-10-CM

## 2022-12-01 DIAGNOSIS — Z00.00 ANNUAL PHYSICAL EXAM: Primary | ICD-10-CM

## 2022-12-01 DIAGNOSIS — Z23 ENCOUNTER FOR IMMUNIZATION: ICD-10-CM

## 2022-12-01 PROBLEM — J45.909 ASTHMA: Status: RESOLVED | Noted: 2021-07-16 | Resolved: 2022-12-01

## 2022-12-01 RX ORDER — TRIAMCINOLONE ACETONIDE 1 MG/G
CREAM TOPICAL
COMMUNITY
Start: 2022-11-09

## 2022-12-01 NOTE — PROGRESS NOTES
ADULT ANNUAL [facility]    NAME: Oneida Leigh  AGE: 48 y o  SEX: female  : 1975     DATE: 2022     Assessment and Plan:     Problem List Items Addressed This Visit        Endocrine    Idiopathic atrophic hypothyroidism     Under control  Continue current medication  We will re-evaluate at next office visit  Respiratory    Mild intermittent asthma without complication     Under control  Continue current medication  We will re-evaluate at next office visit  Cardiovascular and Mediastinum    Benign essential hypertension     Under control  Continue current medication  We will re-evaluate at next office visit  Other    Sjogren's syndrome ([address] )    Mixed hyperlipidemia     Under control  Continue current medication  We will re-evaluate at next office visit  Other Visit Diagnoses     Annual physical exam    -  Primary    Encounter for immunization        Relevant Orders    influenza vaccine, quadrivalent, recombinant, PF, 0 5 mL, for patients 18 yr+ (FLUBLOK) (Completed)    Pneumococcal Conjugate Vaccine 20-valent (PCV20) (Completed)          Immunizations and preventive care screenings were discussed with patient today  Appropriate education was printed on patient's after visit summary  Counseling:  Exercise: the importance of regular exercise/physical activity was discussed  Recommend exercise 3-5 times per week for at least 30 minutes  BMI Counseling: Body mass index is 30 99 kg/m²  The BMI is above normal  Nutrition recommendations include decreasing portion sizes, decreasing fast food intake, limiting drinks that contain sugar, moderation in carbohydrate intake, increasing intake of lean protein and reducing intake of saturated and trans fat  Exercise recommendations include vigorous physical activity 75 minutes/week   No pharmacotherapy was ordered  Rationale for BMI follow-up plan is due to patient being overweight or obese  Return in about 3 months (around 3/1/2023) for Annual physical      Chief Complaint:     Chief Complaint   Patient presents with   • Follow-up     3 month follow up       History of Present Illness:     Adult Annual Physical   Patient here for a comprehensive physical exam  The patient reports problems - joint pains  Diet and Physical Activity  Diet/Nutrition: well balanced diet  Exercise: walking  Depression Screening  PHQ-2/9 Depression Screening         General Health  Sleep: sleeps well  Hearing: normal - bilateral   Vision: no vision problems  Dental: regular dental visits  /GYN Health  Patient is: premenopausal  Last menstrual period: unknown  Contraceptive method: none  Review of Systems:     Review of Systems   Constitutional: Negative for chills, fatigue and fever  HENT: Negative for congestion, ear pain, rhinorrhea, sneezing and sore throat  Eyes: Negative for redness, itching and visual disturbance  Respiratory: Negative for cough, chest tightness and shortness of breath  Cardiovascular: Negative for chest pain, palpitations and leg swelling  Gastrointestinal: Negative for abdominal pain, blood in stool, diarrhea, nausea and vomiting  Endocrine: Negative for cold intolerance and heat intolerance  Genitourinary: Negative for dysuria, frequency and urgency  Musculoskeletal: Positive for arthralgias  Negative for back pain and myalgias  Skin: Negative for color change and rash  Neurological: Negative for dizziness, weakness, light-headedness, numbness and headaches  Hematological: Does not bruise/bleed easily  Psychiatric/Behavioral: Negative for agitation, behavioral problems and confusion        Past Medical History:     Past Medical History:   Diagnosis Date   • Allergic    • Asthma    • Disease of thyroid gland    • Endometriosis    • High cholesterol    • Hypertension 7/15/2021    Normally no but on 7/15 during an episode of slurred speech   • Hypothyroidism    • Obesity    • Sjogren's syndrome ([facility] [address] )    • Stroke ([facility] [address] )     Found out 7/15/2021 with an MRI      Past Surgical History:     Past Surgical History:   Procedure Laterality Date   •  SECTION      x2   • HEMORROIDECTOMY     • MAMMO (HISTORICAL) Bilateral 2017   • WISDOM TOOTH EXTRACTION        Social History:     Social History     Socioeconomic History   • Marital status: /Civil Union     Spouse name: None   • Number of children: None   • Years of education: None   • Highest education level: None   Occupational History   • None   Tobacco Use   • Smoking status: Never   • Smokeless tobacco: Never   Vaping Use   • Vaping Use: Never used   Substance and Sexual Activity   • Alcohol use: Not Currently   • Drug use: Never   • Sexual activity: Yes     Partners: Male     Birth control/protection: None   Other Topics Concern   • None   Social History Narrative   • None     Social Determinants of Health     Financial Resource Strain: Not on file   Food Insecurity: Not on file   Transportation Needs: Not on file   Physical Activity: Not on file   Stress: Not on file   Social Connections: Not on file   Intimate Partner Violence: Not on file   Housing Stability: Not on file      Family History:     Family History   Problem Relation Age of Onset   • Heart disease Mother    • Diabetes Mother    • Hypertension Mother    • Asthma Mother    • Heart disease Father    • Hypertension Father    • Depression Sister    • Mental illness Sister         Bipolar manic depressant     • Bipolar disorder Sister    • Drug abuse Sister    • No Known Problems Daughter    • No Known Problems Daughter    • Cancer Maternal Grandmother    • Stroke Maternal Grandmother    • Stroke Maternal Grandfather    • No Known Problems Paternal Grandmother    • No Known Problems Paternal Grandfather    • No Known Problems Maternal Aunt    • No Known Problems Paternal Aunt       Current Medications:     Current Outpatient Medications   Medication Sig Dispense Refill   • albuterol (PROVENTIL HFA,VENTOLIN HFA) 90 mcg/act inhaler albuterol sulfate HFA 90 mcg/actuation aerosol inhaler   INHALE 2 PUFFS EVERY 4 HOURS AS NEEDED  MAY USE 2 PUFFS 20-30 MINUTES BEFORE PHYSICAL EXERTION     • atorvastatin (LIPITOR) 40 mg tablet TAKE 1 TABLET BY MOUTH EVERY DAY IN THE EVENING 90 tablet 0   • clopidogrel (PLAVIX) 75 mg tablet TAKE 1 TABLET BY MOUTH EVERY DAY 90 tablet 0   • loratadine (CLARITIN) 10 mg tablet Take 10 mg by mouth daily     • Mometasone Furoate (Asmanex HFA) 100 MCG/ACT AERO Take 100 mcg by mouth 2 (two) times a day     • Pediatric Multiple Vitamins (PC PEDIATRIC POLY-VITAMIN DROP PO) Take 1 drop by mouth in the morning     • Synthroid 112 MCG tablet TAKE 1 TABLET DAILY MONDAY THROUGH SATURDAY AND 1 & 1/2 TABLETS SUNDAY FOR HYPOTHYROIDISM 100 tablet 1   • triamcinolone (KENALOG) 0 1 % cream APPLY TO ECZEMA ON BACK TWICE DAILY FOR 2WEEKS THEN AS NEEDED FOR FLARES  • valsartan-hydrochlorothiazide (DIOVAN-HCT) 160-12 5 MG per tablet Take 1 tablet by mouth daily 90 tablet 0     No current facility-administered medications for this visit  Allergies: Allergies   Allergen Reactions   • Asa [Aspirin]    • Avelox [Moxifloxacin]       Physical Exam:     /70 (BP Location: Left arm, Patient Position: Sitting, Cuff Size: Large)   Pulse 68   Temp 98 7 °F (37 1 °C) (Temporal)   Resp 16   Ht 5' 5" (1 651 m)   Wt 84 5 kg (186 lb 3 2 oz)   LMP 07/16/2021 (Exact Date)   SpO2 98%   BMI 30 99 kg/m²     Physical Exam  Vitals and nursing note reviewed  Constitutional:       General: She is not in acute distress  Appearance: Normal appearance  She is well-developed  She is obese  HENT:      Head: Normocephalic and atraumatic  Right Ear: External ear normal  There is no impacted cerumen        Left Ear: External ear normal  There is no impacted cerumen  Nose: Nose normal       Mouth/Throat:      Mouth: Mucous membranes are moist       Pharynx: Oropharynx is clear  Eyes:      Conjunctiva/sclera: Conjunctivae normal    Cardiovascular:      Rate and Rhythm: Normal rate and regular rhythm  Pulses: Normal pulses  Heart sounds: Normal heart sounds  No murmur heard  Pulmonary:      Effort: Pulmonary effort is normal  No respiratory distress  Breath sounds: Normal breath sounds  No wheezing  Abdominal:      General: Abdomen is flat  Bowel sounds are normal       Palpations: Abdomen is soft  Tenderness: There is no abdominal tenderness  Musculoskeletal:         General: No tenderness  Normal range of motion  Cervical back: Normal range of motion and neck supple  No rigidity  Lymphadenopathy:      Cervical: No cervical adenopathy  Skin:     General: Skin is warm and dry  Capillary Refill: Capillary refill takes 2 to 3 seconds  Neurological:      General: No focal deficit present  Mental Status: She is alert and oriented to person, place, and time  Mental status is at baseline        Gait: Gait normal    Psychiatric:         Mood and Affect: Mood normal          Behavior: Behavior normal           Mitali Hancock MD  [facility] [address] [facility]

## 2022-12-01 NOTE — PATIENT INSTRUCTIONS
Frequent home monitor of blood pressure  Diet high in fruit and vegetables and low in salt and cholesterol  Regular cardiovascular exercise  Take all medications regularly as prescribed  Loose weight  Potential consequences of obesity explained to patient    Wellness Visit for Adults   AMBULATORY CARE:   A wellness visit  is when you see your healthcare provider to get screened for health problems  Your healthcare provider will also give you advice on how to stay healthy  Write down your questions so you remember to ask them  Ask your healthcare provider how often you should have a wellness visit  What happens at a wellness visit:  Your healthcare provider will ask about your health, and your family history of health problems  This includes high blood pressure, heart disease, and cancer  He or she will ask if you have symptoms that concern you, if you smoke, and about your mood  You may also be asked about your intake of medicines, supplements, food, and alcohol  Any of the following may be done:  · Your weight  will be checked  Your height may also be checked so your body mass index (BMI) can be calculated  Your BMI shows if you are at a healthy weight  · Your blood pressure  and heart rate will be checked  Your temperature may also be checked  · Blood and urine tests  may be done  Blood tests may be done to check your cholesterol levels  Abnormal cholesterol levels increase your risk for heart disease and stroke  You may also need a blood or urine test to check for diabetes if you are at increased risk  Urine tests may be done to look for signs of an infection or kidney disease  · A physical exam  includes checking your heartbeat and lungs with a stethoscope  Your healthcare provider may also check your skin to look for sun damage  · Screening tests  may be recommended  A screening test is done to check for diseases that may not cause symptoms   The screening tests you may need depend on your age, gender, family history, and lifestyle habits  For example, colorectal screening may be recommended if you are 48years old or older  Screening tests you need if you are a woman:   · A Pap smear  is used to screen for cervical cancer  Pap smears are usually done every 3 to 5 years depending on your age  You may need them more often if you have had abnormal Pap smear test results in the past  Ask your healthcare provider how often you should have a Pap smear  · A mammogram  is an x-ray of your breasts to screen for breast cancer  Experts recommend mammograms every 2 years starting at age 48 years  You may need a mammogram at age 52 years or younger if you have an increased risk for breast cancer  Talk to your healthcare provider about when you should start having mammograms and how often you need them  Vaccines you may need:   · Get an influenza vaccine  every year  The influenza vaccine protects you from the flu  Several types of viruses cause the flu  The viruses change over time, so new vaccines are made each year  · Get a tetanus-diphtheria (Td) booster vaccine  every 10 years  This vaccine protects you against tetanus and diphtheria  Tetanus is a severe infection that may cause painful muscle spasms and lockjaw  Diphtheria is a severe bacterial infection that causes a thick covering in the back of your mouth and throat  · Get a human papillomavirus (HPV) vaccine  if you are female and aged 23 to 32 or male 23 to 24 and never received it  This vaccine protects you from HPV infection  HPV is the most common infection spread by sexual contact  HPV may also cause vaginal, penile, and anal cancers  · Get a pneumococcal vaccine  if you are aged 72 years or older  The pneumococcal vaccine is an injection given to protect you from pneumococcal disease  Pneumococcal disease is an infection caused by pneumococcal bacteria  The infection may cause pneumonia, meningitis, or an ear infection      · Get a shingles vaccine  if you are 60 or older, even if you have had shingles before  The shingles vaccine is an injection to protect you from the varicella-zoster virus  This is the same virus that causes chickenpox  Shingles is a painful rash that develops in people who had chickenpox or have been exposed to the virus  How to eat healthy:  My Plate is a model for planning healthy meals  It shows the types and amounts of foods that should go on your plate  Fruits and vegetables make up about half of your plate, and grains and protein make up the other half  A serving of dairy is included on the side of your plate  The amount of calories and serving sizes you need depends on your age, gender, weight, and height  Examples of healthy foods are listed below:  · Eat a variety of vegetables  such as dark green, red, and orange vegetables  You can also include canned vegetables low in sodium (salt) and frozen vegetables without added butter or sauces  · Eat a variety of fresh fruits , canned fruit in 100% juice, frozen fruit, and dried fruit  · Include whole grains  At least half of the grains you eat should be whole grains  Examples include whole-wheat bread, wheat pasta, brown rice, and whole-grain cereals such as oatmeal     · Eat a variety of protein foods such as seafood (fish and shellfish), lean meat, and poultry without skin (turkey and chicken)  Examples of lean meats include pork leg, shoulder, or tenderloin, and beef round, sirloin, tenderloin, and extra lean ground beef  Other protein foods include eggs and egg substitutes, beans, peas, soy products, nuts, and seeds  · Choose low-fat dairy products such as skim or 1% milk or low-fat yogurt, cheese, and cottage cheese  · Limit unhealthy fats  such as butter, hard margarine, and shortening  Exercise:  Exercise at least 30 minutes per day on most days of the week  Some examples of exercise include walking, biking, dancing, and swimming   You can also fit in more physical activity by taking the stairs instead of the elevator or parking farther away from stores  Include muscle strengthening activities 2 days each week  Regular exercise provides many health benefits  It helps you manage your weight, and decreases your risk for type 2 diabetes, heart disease, stroke, and high blood pressure  Exercise can also help improve your mood  Ask your healthcare provider about the best exercise plan for you  General health and safety guidelines:   · Do not smoke  Nicotine and other chemicals in cigarettes and cigars can cause lung damage  Ask your healthcare provider for information if you currently smoke and need help to quit  E-cigarettes or smokeless tobacco still contain nicotine  Talk to your healthcare provider before you use these products  · Limit alcohol  A drink of alcohol is 12 ounces of beer, 5 ounces of wine, or 1½ ounces of liquor  · Lose weight, if needed  Being overweight increases your risk of certain health conditions  These include heart disease, high blood pressure, type 2 diabetes, and certain types of cancer  · Protect your skin  Do not sunbathe or use tanning beds  Use sunscreen with a SPF 15 or higher  Apply sunscreen at least 15 minutes before you go outside  Reapply sunscreen every 2 hours  Wear protective clothing, hats, and sunglasses when you are outside  · Drive safely  Always wear your seatbelt  Make sure everyone in your car wears a seatbelt  A seatbelt can save your life if you are in an accident  Do not use your cell phone when you are driving  This could distract you and cause an accident  Pull over if you need to make a call or send a text message  · Practice safe sex  Use latex condoms if are sexually active and have more than one partner  Your healthcare provider may recommend screening tests for sexually transmitted infections (STIs)  · Wear helmets, lifejackets, and protective gear    Always wear a helmet when you ride a bike or motorcycle, go skiing, or play sports that could cause a head injury  Wear protective equipment when you play sports  Wear a lifejacket when you are on a boat or doing water sports  © Copyright [employer] 2022 Information is for End User's use only and may not be sold, redistributed or otherwise used for commercial purposes  All illustrations and images included in CareNotes® are the copyrighted property of A D A M , Inc  or [employer]  The above information is an  only  It is not intended as medical advice for individual conditions or treatments  Talk to your doctor, nurse or pharmacist before following any medical regimen to see if it is safe and effective for you

## 2022-12-28 DIAGNOSIS — I10 BENIGN ESSENTIAL HYPERTENSION: ICD-10-CM

## 2022-12-28 RX ORDER — VALSARTAN AND HYDROCHLOROTHIAZIDE 160; 12.5 MG/1; MG/1
TABLET, FILM COATED ORAL
Qty: 90 TABLET | Refills: 0 | Status: SHIPPED | OUTPATIENT
Start: 2022-12-28

## 2022-12-28 NOTE — TELEPHONE ENCOUNTER
Good afternoon  This is Scarlett Tinocont  My date of birth is [birth year]  I had a question, I was just at Doctor Nancy's office with regards to this programs and the RA levels, I guess she said they're a little high thousand twice a day  I just wanted to make sure it doesn't interfere with any of the other medications before I start taking fish oil  Someone give me a call back at [phone]  I would appreciate it  Thank you  Spoke with patient and reassured her that fish oil would most probably not interfere with her other meds  12/28   Ruth Yost RN

## 2022-12-31 DIAGNOSIS — I63.9 STROKE (HCC): ICD-10-CM

## 2023-01-03 RX ORDER — ATORVASTATIN CALCIUM 40 MG/1
TABLET, FILM COATED ORAL
Qty: 90 TABLET | Refills: 0 | Status: SHIPPED | OUTPATIENT
Start: 2023-01-03

## 2023-02-25 DIAGNOSIS — I63.9 STROKE (HCC): ICD-10-CM

## 2023-02-27 RX ORDER — CLOPIDOGREL BISULFATE 75 MG/1
TABLET ORAL
Qty: 90 TABLET | Refills: 0 | Status: SHIPPED | OUTPATIENT
Start: 2023-02-27

## 2023-03-15 ENCOUNTER — RA CDI HCC (OUTPATIENT)
Dept: OTHER | Facility: HOSPITAL | Age: 48
End: 2023-03-15

## 2023-03-15 NOTE — PROGRESS NOTES
[facility]  coding opportunities          Chart Reviewed number of suggestions sent to Provider: 1   [id number]    This is a reminder to address ALL HCC (risk adjustment) codes as found on active problem list for 2023 as patient scores reset to zero CECILY      Patients Insurance        Commercial Insurance: [address] [place] [address]

## 2023-03-22 ENCOUNTER — OFFICE VISIT (OUTPATIENT)
Dept: FAMILY MEDICINE CLINIC | Facility: CLINIC | Age: 48
End: 2023-03-22

## 2023-03-22 VITALS
HEIGHT: 65 IN | HEART RATE: 78 BPM | DIASTOLIC BLOOD PRESSURE: 80 MMHG | SYSTOLIC BLOOD PRESSURE: 124 MMHG | WEIGHT: 190 LBS | TEMPERATURE: 97.5 F | BODY MASS INDEX: 31.65 KG/M2 | OXYGEN SATURATION: 94 % | RESPIRATION RATE: 16 BRPM

## 2023-03-22 DIAGNOSIS — E03.4 IDIOPATHIC ATROPHIC HYPOTHYROIDISM: ICD-10-CM

## 2023-03-22 DIAGNOSIS — E78.2 MIXED HYPERLIPIDEMIA: ICD-10-CM

## 2023-03-22 DIAGNOSIS — I10 BENIGN ESSENTIAL HYPERTENSION: Primary | ICD-10-CM

## 2023-03-22 DIAGNOSIS — J45.20 MILD INTERMITTENT ASTHMA WITHOUT COMPLICATION: ICD-10-CM

## 2023-03-22 NOTE — PROGRESS NOTES
Assessment/Plan:       Problem List Items Addressed This Visit        Endocrine    Idiopathic atrophic hypothyroidism     Stable, follows with endocrinology            Respiratory    Mild intermittent asthma without complication     Well controlled  Follows with allergy and immunology            Cardiovascular and Mediastinum    Benign essential hypertension - Primary     Blood pressure is well controlled on current regimen  Continue same meds without changes  Encouraged to check blood pressure 1-2 times per week and keep log  Call if readings consistently >140/90  Encouraged healthy diet and exercise  Follow up in 4 months                Other    Mixed hyperlipidemia     Controlled on statin  Continue same dose of lipitor              Subjective:     Chief Complaint   Patient presents with   • Follow-up          Patient ID: Lyndsey Larios is a 50 y o  female who is here for follow up  Patient has a history of hypertension treated with valsartan-HCTZ  They are compliant with medications  Denies any side effects  Denies chest pain, shortness of breath, headache, vision changes  They do check blood pressure at home  Readings are normal  They admit that they do not regularly exercise but tries to follow a healthy diet  Recently lost mother and mother in law so just coping with that  Sleeping ok and appetite is normal  Still able to do and enjoy things she normally does  Follows with rheum for Sjogren's  No recent changes in meds  Also follows with endo for her thyroid and cardiology  Patient's past medical history, surgical history, family history, medications, allergies and social history reviewed and updated    Review of Systems   Constitutional: Negative for chills and fever  HENT: Negative for congestion and sore throat  Respiratory: Negative for cough and shortness of breath  Cardiovascular: Negative for chest pain and leg swelling     Gastrointestinal: Negative for abdominal pain, blood in stool and diarrhea  Genitourinary: Negative for dysuria and frequency  Neurological: Negative for headaches  All other ROS negative  Objective:    Vitals:    03/22/23 1425   BP: 124/80   Pulse: 78   Resp: 16   Temp: 97 5 °F (36 4 °C)   SpO2: 94%          Physical Exam  Vitals reviewed  Constitutional:       General: She is not in acute distress  HENT:      Right Ear: External ear normal       Left Ear: External ear normal       Nose: Nose normal       Mouth/Throat:      Mouth: Mucous membranes are moist    Eyes:      Conjunctiva/sclera: Conjunctivae normal    Cardiovascular:      Rate and Rhythm: Normal rate and regular rhythm  Heart sounds: No murmur heard  Pulmonary:      Effort: Pulmonary effort is normal  No respiratory distress  Breath sounds: No wheezing  Abdominal:      General: There is no distension  Palpations: Abdomen is soft  Tenderness: There is no abdominal tenderness  Musculoskeletal:      Right lower leg: No edema  Left lower leg: No edema  Lymphadenopathy:      Cervical: No cervical adenopathy  Neurological:      Mental Status: She is alert and oriented to person, place, and time  Psychiatric:         Mood and Affect: Affect is tearful ( when discussing mother)               Tonia Griffin MD  Internal Medicine and Pediatrics

## 2023-03-22 NOTE — ASSESSMENT & PLAN NOTE
Blood pressure is well controlled on current regimen  Continue same meds without changes  Encouraged to check blood pressure 1-2 times per week and keep log   Call if readings consistently >140/90  Encouraged healthy diet and exercise  Follow up in 4 months

## 2023-03-25 DIAGNOSIS — I10 BENIGN ESSENTIAL HYPERTENSION: ICD-10-CM

## 2023-03-27 RX ORDER — VALSARTAN AND HYDROCHLOROTHIAZIDE 160; 12.5 MG/1; MG/1
TABLET, FILM COATED ORAL
Qty: 90 TABLET | Refills: 0 | Status: SHIPPED | OUTPATIENT
Start: 2023-03-27

## 2023-03-29 DIAGNOSIS — E06.3 HYPOTHYROIDISM DUE TO HASHIMOTO'S THYROIDITIS: ICD-10-CM

## 2023-03-29 DIAGNOSIS — E03.8 HYPOTHYROIDISM DUE TO HASHIMOTO'S THYROIDITIS: ICD-10-CM

## 2023-03-29 RX ORDER — LEVOTHYROXINE SODIUM 112 MCG
TABLET ORAL
Qty: 100 TABLET | Refills: 1 | Status: SHIPPED | OUTPATIENT
Start: 2023-03-29

## 2023-04-01 ENCOUNTER — APPOINTMENT (OUTPATIENT)
Dept: RADIOLOGY | Facility: MEDICAL CENTER | Age: 48
End: 2023-04-01

## 2023-04-01 DIAGNOSIS — M79.672 LEFT FOOT PAIN: ICD-10-CM

## 2023-04-01 DIAGNOSIS — M21.41 FLAT FEET: ICD-10-CM

## 2023-04-01 DIAGNOSIS — M21.42 FLAT FEET: ICD-10-CM

## 2023-05-08 ENCOUNTER — OFFICE VISIT (OUTPATIENT)
Dept: ENDOCRINOLOGY | Facility: CLINIC | Age: 48
End: 2023-05-08

## 2023-05-08 VITALS
WEIGHT: 188 LBS | DIASTOLIC BLOOD PRESSURE: 70 MMHG | BODY MASS INDEX: 31.32 KG/M2 | SYSTOLIC BLOOD PRESSURE: 110 MMHG | HEIGHT: 65 IN

## 2023-05-08 DIAGNOSIS — E06.3 HYPOTHYROIDISM DUE TO HASHIMOTO'S THYROIDITIS: Primary | ICD-10-CM

## 2023-05-08 DIAGNOSIS — E03.8 HYPOTHYROIDISM DUE TO HASHIMOTO'S THYROIDITIS: Primary | ICD-10-CM

## 2023-05-08 DIAGNOSIS — E55.9 VITAMIN D DEFICIENCY: ICD-10-CM

## 2023-05-08 DIAGNOSIS — E66.9 OBESITY (BMI 30-39.9): ICD-10-CM

## 2023-05-08 DIAGNOSIS — I10 BENIGN ESSENTIAL HYPERTENSION: ICD-10-CM

## 2023-05-08 RX ORDER — LEVOTHYROXINE SODIUM 112 MCG
TABLET ORAL
Qty: 100 TABLET | Refills: 3 | Status: SHIPPED | OUTPATIENT
Start: 2023-05-08

## 2023-05-08 NOTE — PROGRESS NOTES
Rhona Sheets 50 y o  female MRN: [id number]    Encounter: [id number]      Assessment/Plan     Assessment: This is a 50y o -year-old female with hypothyroidism  Plan:    Diagnoses and all orders for this visit:    Hypothyroidism due to Hashimoto's thyroiditis    Lab Results   Component Value Date    [id number] 0 796 09/25/2021   TSH is 1 1 from May 2, 2023, free T4 is 0 9  Continue Synthroid 112 mcg daily from Monday through Saturday and 1 5 tablet on Sunday for now  Repeat TSH, free T4 in 12 months      -     T4, free; Future  -     TSH, 3rd generation; Future  -     Synthroid 112 MCG tablet; Take one tablet daily from Monday to Saturday and 1 5 tablet on Sunday    Obesity (BMI 30-39  9)  Educated about lifestyle modifications, dietary modification and importance of exercise routine 5 times a week    Vitamin D deficiency  Vitamin D is 38  Continue current dose of vitamin D3 supplementation  -     Vitamin D 25 hydroxy; Future    Benign essential hypertension  Blood pressure well controlled, continue current management      CC:   Hypothyroidism, HTN, vitamin D deficiency      History of Present Illness     HPI:    Rhona Sheets is 49-year-old woman with medical history of hypothyroidism, obesity, vitamin D deficiency is here for follow-up  For hypothyroidism she takes brand Synthroid 112 mcg daily on empty stomach 1 hour before breakfast     Last TSH is from May 2023 is 1 1, free T4 is 0 9, vitamin D is 38  For hyperlipidemia, she takes Lipitor 40 mg daily at bedtime, denies side effects  For hypertension, she takes Diovan/hydrochlorothiazide 160/12 5 mg daily, blood pressure well controlled      She takes 1600 IU daily ( drops)       Lab Results   Component Value Date    [id number] 0 796 09/25/2021       BP Readings from Last 3 Encounters:   05/08/23 110/70   03/22/23 124/80   12/01/22 120/70         Review of Systems   Constitutional: Negative for activity change, diaphoresis, fatigue, fever and unexpected weight change  HENT: Negative  Eyes: Negative for visual disturbance  Respiratory: Negative for cough, chest tightness and shortness of breath  Cardiovascular: Negative for chest pain, palpitations and leg swelling  Gastrointestinal: Negative for abdominal pain, blood in stool, constipation, diarrhea, nausea and vomiting  Endocrine: Negative for cold intolerance, heat intolerance, polydipsia, polyphagia and polyuria  Genitourinary: Negative for dysuria, enuresis, frequency and urgency  Musculoskeletal: Negative for arthralgias and myalgias  Skin: Negative for pallor, rash and wound  Allergic/Immunologic: Negative  Neurological: Negative for dizziness, tremors, weakness and numbness  Hematological: Negative  Psychiatric/Behavioral: Negative  Historical Information   Past Medical History:   Diagnosis Date   • Allergic    • Asthma    • Disease of thyroid gland    • Endometriosis    • High cholesterol    • Hypertension 7/15/2021    Normally no but on 7/15 during an episode of slurred speech   • Hypothyroidism    • Obesity    • Sjogren's syndrome ([facility] [address] [facility] )    • [facility]     Found out 7/15/2021 with an MRI     Past Surgical History:   Procedure Laterality Date   •  SECTION      x2   • HEMORROIDECTOMY     • MAMMO (HISTORICAL) Bilateral 2017   • WISDOM TOOTH EXTRACTION       Social History   Social History     Substance and Sexual Activity   Alcohol Use Not Currently     Social History     Substance and Sexual Activity   Drug Use Never     Social History     Tobacco Use   Smoking Status Never   • Passive exposure: Past   Smokeless Tobacco Never     Family History:   Family History   Problem Relation Age of Onset   • Heart disease Mother    • Diabetes Mother    • Hypertension Mother    • Asthma Mother    • Heart disease Father    • Hypertension Father    • Depression Sister    • Mental illness Sister         Bipolar manic depressant     • Bipolar disorder Sister "  • Drug abuse Sister    • No Known Problems Daughter    • No Known Problems Daughter    • Cancer Maternal Grandmother    • Stroke Maternal Grandmother    • Stroke Maternal Grandfather    • No Known Problems Paternal Grandmother    • No Known Problems Paternal Grandfather    • No Known Problems Maternal Aunt    • No Known Problems Paternal Aunt        Meds/Allergies   Current Outpatient Medications   Medication Sig Dispense Refill   • albuterol (PROVENTIL HFA,VENTOLIN HFA) 90 mcg/act inhaler albuterol sulfate HFA 90 mcg/actuation aerosol inhaler   INHALE 2 PUFFS EVERY 4 HOURS AS NEEDED  MAY USE 2 PUFFS 20-30 MINUTES BEFORE PHYSICAL EXERTION     • atorvastatin (LIPITOR) 40 mg tablet TAKE 1 TABLET BY MOUTH EVERY DAY IN THE EVENING 90 tablet 0   • clopidogrel (PLAVIX) 75 mg tablet TAKE 1 TABLET BY MOUTH EVERY DAY 90 tablet 0   • loratadine (CLARITIN) 10 mg tablet Take 10 mg by mouth daily     • Mometasone Furoate (Asmanex HFA) 100 MCG/ACT AERO Take 100 mcg by mouth 2 (two) times a day     • Pediatric Multiple Vitamins (PC PEDIATRIC POLY-VITAMIN DROP PO) Take 1 drop by mouth in the morning Vitamin D     • Synthroid 112 MCG tablet Take one tablet daily from Monday to Saturday and 1 5 tablet on Sunday 100 tablet 3   • triamcinolone (KENALOG) 0 1 % cream APPLY TO ECZEMA ON BACK TWICE DAILY FOR 2WEEKS THEN AS NEEDED FOR FLARES  • valsartan-hydrochlorothiazide (DIOVAN-HCT) 160-12 5 MG per tablet TAKE 1 TABLET BY MOUTH EVERY DAY 90 tablet 0     No current facility-administered medications for this visit  Allergies   Allergen Reactions   • Asa [Aspirin]    • Avelox [Moxifloxacin]        Objective   Vitals: Blood pressure 110/70, height 5' 5\" (1 651 m), weight 85 3 kg (188 lb), last menstrual period 07/16/2021, not currently breastfeeding  Physical Exam  Vitals reviewed  Constitutional:       General: She is not in acute distress  Appearance: Normal appearance  She is not ill-appearing     HENT:      Head: " "Normocephalic and atraumatic  Nose: Nose normal    Eyes:      Extraocular Movements: Extraocular movements intact  Conjunctiva/sclera: Conjunctivae normal    Cardiovascular:      Rate and Rhythm: Normal rate and regular rhythm  Pulses: Normal pulses  Heart sounds: Normal heart sounds  Pulmonary:      Effort: No respiratory distress  Musculoskeletal:         General: Normal range of motion  Cervical back: Normal range of motion and neck supple  Right lower leg: No edema  Left lower leg: No edema  Skin:     General: Skin is warm and dry  Neurological:      General: No focal deficit present  Mental Status: She is alert and oriented to person, place, and time  Psychiatric:         Mood and Affect: Mood normal          Behavior: Behavior normal          The history was obtained from the review of the chart, patient  Lab Results:        Imaging Studies:       I have personally reviewed pertinent reports  Portions of the record may have been created with voice recognition software  Occasional wrong word or \"sound a like\" substitutions may have occurred due to the inherent limitations of voice recognition software  Read the chart carefully and recognize, using context, where substitutions have occurred    "

## 2023-05-23 ENCOUNTER — TELEPHONE (OUTPATIENT)
Dept: FAMILY MEDICINE CLINIC | Facility: CLINIC | Age: 48
End: 2023-05-23

## 2023-05-23 DIAGNOSIS — I63.9 STROKE (HCC): ICD-10-CM

## 2023-05-23 RX ORDER — ATORVASTATIN CALCIUM 40 MG/1
40 TABLET, FILM COATED ORAL EVERY EVENING
Qty: 90 TABLET | Refills: 0 | Status: SHIPPED | OUTPATIENT
Start: 2023-05-23

## 2023-06-14 DIAGNOSIS — I63.9 STROKE (HCC): ICD-10-CM

## 2023-06-14 RX ORDER — CLOPIDOGREL BISULFATE 75 MG/1
TABLET ORAL
Qty: 90 TABLET | Refills: 0 | Status: SHIPPED | OUTPATIENT
Start: 2023-06-14

## 2023-07-07 ENCOUNTER — ANNUAL EXAM (OUTPATIENT)
Dept: GYNECOLOGY | Facility: CLINIC | Age: 48
End: 2023-07-07
Payer: COMMERCIAL

## 2023-07-07 VITALS
DIASTOLIC BLOOD PRESSURE: 80 MMHG | HEIGHT: 63 IN | BODY MASS INDEX: 33.84 KG/M2 | SYSTOLIC BLOOD PRESSURE: 122 MMHG | WEIGHT: 191 LBS

## 2023-07-07 DIAGNOSIS — Z12.11 SCREENING FOR COLORECTAL CANCER: ICD-10-CM

## 2023-07-07 DIAGNOSIS — Z01.419 ROUTINE GYNECOLOGICAL EXAMINATION: ICD-10-CM

## 2023-07-07 DIAGNOSIS — Z01.419 ENCOUNTER FOR WELL WOMAN EXAM: Primary | ICD-10-CM

## 2023-07-07 DIAGNOSIS — Z11.51 SCREENING FOR HPV (HUMAN PAPILLOMAVIRUS): ICD-10-CM

## 2023-07-07 DIAGNOSIS — Z12.12 SCREENING FOR COLORECTAL CANCER: ICD-10-CM

## 2023-07-07 DIAGNOSIS — Z12.39 ENCOUNTER FOR SCREENING BREAST EXAMINATION: ICD-10-CM

## 2023-07-07 DIAGNOSIS — Z12.4 CERVICAL CANCER SCREENING: ICD-10-CM

## 2023-07-07 PROCEDURE — S0612 ANNUAL GYNECOLOGICAL EXAMINA: HCPCS | Performed by: PHYSICIAN ASSISTANT

## 2023-07-07 RX ORDER — CHLORAL HYDRATE 500 MG
1000 CAPSULE ORAL 2 TIMES DAILY
COMMUNITY

## 2023-07-07 NOTE — PROGRESS NOTES
Assessment/Plan:    No problem-specific Assessment & Plan notes found for this encounter. Diagnoses and all orders for this visit:    Encounter for well woman exam    Encounter for screening breast examination    Cervical cancer screening    Screening for HPV (human papillomavirus)  -     GP Pap Dependent HPV Cotest >= 27years old    Screening for colorectal cancer  -     Ambulatory Referral to Gastroenterology; Future    Routine gynecological examination  -     GP Pap Dependent HPV Cotest >= 27years old    Other orders  -     Omega-3 Fatty Acids (fish oil) 1,000 mg; Take 1,000 mg by mouth 2 (two) times a day          Subjective:      Patient ID: Zack Sim is a 50 y.o. female. Pt presents for her annual exam today--  She has no gyn complaints  She has irreg bleeding  no pelvic pain  Bowel and bladder are regular  Colonoscopy--2013  No breast concerns today  Last mammo--2022      pap today. rx mamm already in  rx colon  Daily ca, d    H/o HTN/CVA      The following portions of the patient's history were reviewed and updated as appropriate: allergies, current medications, past family history, past medical history, past social history, past surgical history and problem list.    Review of Systems   Constitutional: Negative for chills, fever and unexpected weight change. Gastrointestinal: Negative for abdominal pain, blood in stool, constipation and diarrhea. Genitourinary: Negative. Objective:      /80   Ht 5' 3.39" (1.61 m)   Wt 86.6 kg (191 lb)   LMP 04/19/2023 (Approximate)   BMI 33.42 kg/m²          Physical Exam  Vitals and nursing note reviewed. Constitutional:       Appearance: She is well-developed. HENT:      Head: Normocephalic and atraumatic. Chest:   Breasts:     Right: No inverted nipple, mass, nipple discharge or skin change. Left: No inverted nipple, mass, nipple discharge or skin change. Abdominal:      Palpations: Abdomen is soft. Genitourinary:     Exam position: Supine. Labia:         Right: No rash, tenderness or lesion. Left: No rash, tenderness or lesion. Vagina: Normal.      Cervix: No cervical motion tenderness, discharge or friability. Uterus: Normal.       Adnexa:         Right: No mass, tenderness or fullness. Left: No mass, tenderness or fullness. Musculoskeletal:      Cervical back: Normal range of motion. Lymphadenopathy:      Lower Body: No right inguinal adenopathy. No left inguinal adenopathy.

## 2023-07-12 LAB
HPV HR 12 DNA CVX QL NAA+PROBE: NOT DETECTED
HPV16 DNA SPEC QL NAA+PROBE: NOT DETECTED
HPV18 DNA SPEC QL NAA+PROBE: NOT DETECTED
THIN PREP CVX: NORMAL

## 2023-07-14 ENCOUNTER — RA CDI HCC (OUTPATIENT)
Dept: OTHER | Facility: HOSPITAL | Age: 48
End: 2023-07-14

## 2023-07-14 NOTE — PROGRESS NOTES
[address] [facility] coding opportunities       Chart reviewed, no opportunity found: CHART REVIEWED, NO OPPORTUNITY FOUND        Patients Insurance        Commercial Insurance: Cristian Plata

## 2023-07-21 ENCOUNTER — OFFICE VISIT (OUTPATIENT)
Dept: FAMILY MEDICINE CLINIC | Facility: CLINIC | Age: 48
End: 2023-07-21
Payer: COMMERCIAL

## 2023-07-21 VITALS
SYSTOLIC BLOOD PRESSURE: 122 MMHG | HEIGHT: 63 IN | BODY MASS INDEX: 33.84 KG/M2 | WEIGHT: 191 LBS | OXYGEN SATURATION: 98 % | HEART RATE: 56 BPM | TEMPERATURE: 97.6 F | DIASTOLIC BLOOD PRESSURE: 70 MMHG

## 2023-07-21 DIAGNOSIS — I10 BENIGN ESSENTIAL HYPERTENSION: Primary | ICD-10-CM

## 2023-07-21 DIAGNOSIS — J45.20 MILD INTERMITTENT ASTHMA WITHOUT COMPLICATION: ICD-10-CM

## 2023-07-21 DIAGNOSIS — E78.2 MIXED HYPERLIPIDEMIA: ICD-10-CM

## 2023-07-21 DIAGNOSIS — E03.4 IDIOPATHIC ATROPHIC HYPOTHYROIDISM: ICD-10-CM

## 2023-07-21 DIAGNOSIS — Z12.11 SCREEN FOR COLON CANCER: ICD-10-CM

## 2023-07-21 DIAGNOSIS — M35.00 SJOGREN'S SYNDROME, WITH UNSPECIFIED ORGAN INVOLVEMENT (HCC): ICD-10-CM

## 2023-07-21 PROCEDURE — 99213 OFFICE O/P EST LOW 20 MIN: CPT

## 2023-07-21 NOTE — PROGRESS NOTES
Assessment/Plan:         Problem List Items Addressed This Visit        Endocrine    Idiopathic atrophic hypothyroidism     Under control. Continue current medication. We will re-evaluate at next office visit. Respiratory    Mild intermittent asthma without complication     Under control. Continue current medication. We will re-evaluate at next office visit. Cardiovascular and Mediastinum    Benign essential hypertension - Primary     Under control. Continue current medication. We will re-evaluate at next office visit. Other    Sjogren's syndrome ([address])     Under control. Continue current medication. We will re-evaluate at next office visit. Patient has been followed by a rheumatologist         Mixed hyperlipidemia     Under control. With diet and exercise  Continue current medication  We will re-evaluate at next office visit. Other Visit Diagnoses     Screen for colon cancer        Relevant Orders    Ambulatory referral to Gastroenterology            Subjective:      Patient ID: Yudelka García is a 50 y.o. female. Patient here for review of chronic medical problems and  the labs and imaging if it is applicable. Currently has no specific complaints other than mentioned in the review of systems  Denies chest pain, SOB, cough, abdominal pain, nausea, vomiting, fever, chills, lightheadedness, dizziness,headache, tingling or numbness. No bowel or bladder problem. The following portions of the patient's history were reviewed and updated as appropriate:   Past Medical History:  She has a past medical history of Allergic, Asthma, Disease of thyroid gland, Endometriosis, High cholesterol, Hypertension (7/15/2021), Hypothyroidism, Obesity, Sjogren's syndrome ([address]), and Stroke ([address]). ,  _______________________________________________________________________  Medical Problems:  does not have any pertinent problems on file.,  _______________________________________________________________________  Past Surgical History:   has a past surgical history that includes  section; [place] tooth extraction; Hemorroidectomy; and Mammo (historical) (Bilateral, 2017). ,  _______________________________________________________________________  Family History:  family history includes Asthma in her mother; Bipolar disorder in her sister; Cancer in her maternal grandmother; Depression in her sister; Diabetes in her mother; Drug abuse in her sister; Heart disease in her father and mother; Hypertension in her father and mother; Mental illness in her sister; No Known Problems in her daughter, daughter, maternal aunt, paternal aunt, paternal grandfather, and paternal grandmother; Stroke in her maternal grandfather and maternal grandmother.,  _______________________________________________________________________  Social History:   reports that she has never smoked. She has been exposed to tobacco smoke. She has never used smokeless tobacco. She reports that she does not currently use alcohol. She reports that she does not use drugs. ,  _______________________________________________________________________  Allergies:  is allergic to asa [aspirin] and avelox [moxifloxacin]. .  _______________________________________________________________________  Current Outpatient Medications   Medication Sig Dispense Refill   • albuterol (PROVENTIL HFA,VENTOLIN HFA) 90 mcg/act inhaler albuterol sulfate HFA 90 mcg/actuation aerosol inhaler   INHALE 2 PUFFS EVERY 4 HOURS AS NEEDED. MAY USE 2 PUFFS 20-30 MINUTES BEFORE PHYSICAL EXERTION     • atorvastatin (LIPITOR) 40 mg tablet Take 1 tablet (40 mg total) by mouth every evening 90 tablet 0   • clopidogrel (PLAVIX) 75 mg tablet TAKE 1 TABLET BY MOUTH EVERY DAY 90 tablet 0   • loratadine (CLARITIN) 10 mg tablet Take 10 mg by mouth daily     • Mometasone Furoate (Asmanex HFA) 100 MCG/ACT AERO Take 100 mcg by mouth 2 (two) times a day     • Omega-3 Fatty Acids (fish oil) 1,000 mg Take 1,000 mg by mouth 2 (two) times a day     • Pediatric Multiple Vitamins (PC PEDIATRIC POLY-VITAMIN DROP PO) Take 1 drop by mouth in the morning Vitamin D     • Synthroid 112 MCG tablet Take one tablet daily from Monday to Saturday and 1.5 tablet on Sunday 100 tablet 3   • triamcinolone (KENALOG) 0.1 % cream APPLY TO ECZEMA ON BACK TWICE DAILY FOR 2WEEKS THEN AS NEEDED FOR FLARES. • valsartan-hydrochlorothiazide (DIOVAN-HCT) 160-12.5 MG per tablet TAKE 1 TABLET BY MOUTH EVERY DAY 90 tablet 0     No current facility-administered medications for this visit.     _______________________________________________________________________  Review of Systems   Constitutional: Negative for chills, fatigue and fever. HENT: Negative for congestion, ear pain, rhinorrhea, sneezing and sore throat. Eyes: Negative for redness, itching and visual disturbance. Respiratory: Negative for cough, chest tightness and shortness of breath. Cardiovascular: Negative for chest pain, palpitations and leg swelling. Gastrointestinal: Negative for abdominal pain, blood in stool, diarrhea, nausea and vomiting. Endocrine: Negative for cold intolerance and heat intolerance. Genitourinary: Negative for dysuria, frequency and urgency. Musculoskeletal: Positive for arthralgias. Negative for back pain and myalgias. Skin: Negative for color change and rash. Neurological: Negative for dizziness, weakness, light-headedness, numbness and headaches. Hematological: Does not bruise/bleed easily. Psychiatric/Behavioral: Negative for agitation, behavioral problems and confusion.          Objective:  Vitals:    07/21/23 1123   BP: 122/70   BP Location: Left arm   Patient Position: Sitting   Cuff Size: Adult   Pulse: 56   Temp: 97.6 °F (36.4 °C)   TempSrc: Tympanic   SpO2: 98%   Weight: 86.6 kg (191 lb)   Height: 5' 3.39" (1.61 m)     Body mass index is 33.42 kg/m². Physical Exam  Constitutional:       General: She is not in acute distress. Appearance: Normal appearance. She is not ill-appearing, toxic-appearing or diaphoretic. HENT:      Head: Normocephalic and atraumatic. Right Ear: Tympanic membrane normal.      Left Ear: Tympanic membrane normal.      Nose: Nose normal. No congestion. Mouth/Throat:      Mouth: Mucous membranes are moist.   Eyes:      General: No scleral icterus. Right eye: No discharge. Left eye: No discharge. Extraocular Movements: Extraocular movements intact. Conjunctiva/sclera: Conjunctivae normal.      Pupils: Pupils are equal, round, and reactive to light. Cardiovascular:      Rate and Rhythm: Normal rate and regular rhythm. Pulses: Normal pulses. Heart sounds: Normal heart sounds. No murmur heard. No gallop. Pulmonary:      Effort: Pulmonary effort is normal. No respiratory distress. Breath sounds: Normal breath sounds. No wheezing, rhonchi or rales. Abdominal:      General: Abdomen is flat. Bowel sounds are normal. There is no distension. Palpations: Abdomen is soft. Tenderness: There is no abdominal tenderness. There is no guarding. Musculoskeletal:         General: Tenderness (left heel) present. No swelling. Normal range of motion. Cervical back: Normal range of motion and neck supple. No rigidity. Lymphadenopathy:      Cervical: No cervical adenopathy. Skin:     General: Skin is warm. Capillary Refill: Capillary refill takes 2 to 3 seconds. Coloration: Skin is not jaundiced. Findings: No bruising or rash. Neurological:      General: No focal deficit present. Mental Status: She is alert and oriented to person, place, and time. Mental status is at baseline.       Gait: Gait normal.   Psychiatric:         Mood and Affect: Mood normal.

## 2023-07-21 NOTE — ASSESSMENT & PLAN NOTE
Under control. With diet and exercise  Continue current medication  We will re-evaluate at next office visit.

## 2023-07-21 NOTE — ASSESSMENT & PLAN NOTE
Under control. Continue current medication. We will re-evaluate at next office visit.   Patient has been followed by a rheumatologist

## 2023-07-26 ENCOUNTER — HOSPITAL ENCOUNTER (OUTPATIENT)
Dept: RADIOLOGY | Facility: MEDICAL CENTER | Age: 48
Discharge: HOME | End: 2023-07-26
Payer: COMMERCIAL

## 2023-07-26 VITALS — WEIGHT: 191 LBS | BODY MASS INDEX: 33.84 KG/M2 | HEIGHT: 63 IN

## 2023-07-26 DIAGNOSIS — Z12.31 SCREENING MAMMOGRAM FOR HIGH-RISK PATIENT: ICD-10-CM

## 2023-07-26 DIAGNOSIS — Z12.31 ENCOUNTER FOR SCREENING MAMMOGRAM FOR MALIGNANT NEOPLASM OF BREAST: ICD-10-CM

## 2023-07-26 PROCEDURE — 77067 SCR MAMMO BI INCL CAD: CPT

## 2023-07-26 PROCEDURE — 77063 BREAST TOMOSYNTHESIS BI: CPT

## 2023-08-01 ENCOUNTER — CONSULT (OUTPATIENT)
Dept: GASTROENTEROLOGY | Facility: CLINIC | Age: 48
End: 2023-08-01
Payer: COMMERCIAL

## 2023-08-01 ENCOUNTER — TELEPHONE (OUTPATIENT)
Dept: GASTROENTEROLOGY | Facility: CLINIC | Age: 48
End: 2023-08-01

## 2023-08-01 VITALS
BODY MASS INDEX: 33.66 KG/M2 | SYSTOLIC BLOOD PRESSURE: 122 MMHG | WEIGHT: 190 LBS | HEART RATE: 73 BPM | HEIGHT: 63 IN | DIASTOLIC BLOOD PRESSURE: 76 MMHG

## 2023-08-01 DIAGNOSIS — Z95.818 STATUS POST PLACEMENT OF IMPLANTABLE LOOP RECORDER: ICD-10-CM

## 2023-08-01 DIAGNOSIS — Z12.11 SCREEN FOR COLON CANCER: Primary | ICD-10-CM

## 2023-08-01 DIAGNOSIS — Z12.12 SCREENING FOR RECTAL CANCER: ICD-10-CM

## 2023-08-01 DIAGNOSIS — G45.9 TIA (TRANSIENT ISCHEMIC ATTACK): ICD-10-CM

## 2023-08-01 PROCEDURE — 99204 OFFICE O/P NEW MOD 45 MIN: CPT | Performed by: INTERNAL MEDICINE

## 2023-08-01 RX ORDER — POLYETHYLENE GLYCOL 3350, SODIUM SULFATE ANHYDROUS, SODIUM BICARBONATE, SODIUM CHLORIDE, POTASSIUM CHLORIDE 236; 22.74; 6.74; 5.86; 2.97 G/4L; G/4L; G/4L; G/4L; G/4L
4 POWDER, FOR SOLUTION ORAL ONCE
Qty: 4000 ML | Refills: 0 | Status: SHIPPED | OUTPATIENT
Start: 2023-08-01 | End: 2023-08-01

## 2023-08-01 NOTE — TELEPHONE ENCOUNTER
[address] [facility],[address] Assessment    Name: Werner Merida  YOB: 1975  Last Height: 5' 3" (1.6 m)  Last weight: 86.2 kg (190 lb)  BMI: 33.66 kg/m²  Procedure: Colon  Diagnosis: see order  Date of procedure: 9/11/23  Prep: golytely   Responsible : yes  Phone#: [phone]  Name completing form: Kannan Bruno  Date form completed: 08/01/23      If the patient answers yes to any of these questions, schedule in a hospital  Are you pregnant: No  Do you rely on a wheelchair for mobility: No  Have you been diagnosed with End Stage Renal Disease (ESRD): No  Do you need oxygen during the day: No  Have you had a heart attack or stroke within the past three months: No  Have you had a seizure within the past three months: No  Have you ever been informed by anesthesia that you have a difficult airway: No  Additional Questions  Have you had any cardiac testing or are under the care of a Cardiologist (see cardiac list): No  Cardiac list:   Do you have an implanted cardiac defibrillator: No (Comment:  This patient should be scheduled in the hospital)    Have any bleeding problems, such as anemia or hemophilia (If patient has H&H result below 8, schedule in hospital.  H&H must be within 30 days of procedure): No    Had an organ transplant within the past 3 months: No    Do you have any present infections: No  Do you get short of breath when walking a few blocks: No  Have you been diagnosed with diabetes: No  Comments (provide cardiac provider information if applicable):

## 2023-08-01 NOTE — TELEPHONE ENCOUNTER
Scheduled date of colonoscopy (as of today): 9/11/23  Physician performing colonoscopy: Dr Harjinder Bender  Location of colonoscopy: [facility]  Bowel prep reviewed with patient: golytely given at appt   Instructions reviewed with patient by: ls  Clearances: n/a

## 2023-08-01 NOTE — PROGRESS NOTES
[facility] - Outpatient Consultation  Jose D Nunez 50 y.o. female MRN: [id number]  Encounter: [id number]          ASSESSMENT AND PLAN:      1. Screen for colon cancer  -     Ambulatory referral to Gastroenterology  -     Colonoscopy; Future; Expected date: 08/01/2023  -     polyethylene glycol (Golytely) 4000 mL solution; Take 4,000 mL by mouth once for 1 dose Take according to instructions given by the office for colonoscopy bowel prep. 2. Screening for rectal cancer  -     Colonoscopy; Future; Expected date: 08/01/2023  -     polyethylene glycol (Golytely) 4000 mL solution; Take 4,000 mL by mouth once for 1 dose Take according to instructions given by the office for colonoscopy bowel prep. 3. Status post placement of implantable loop recorder    4. TIA (transient ischemic attack)      Patient evaluate for screening for colorectal cancer, colonoscopy procedure and prep discussed at length. She will have to stop her Plavix, 5 days before the procedure, will get clearance from her cardiologist at she is trying to work with her schedule, schedule in September. ______________________________________________________________________    HPI:     She came in to discuss screening colonoscopy, last colonoscopy 10 years ago in August 2013. Patient denies any GI symptoms of blood in stools melena hematochezia change appetite is fair weight stable, bowels and terms of dysphagia reflux. Denies family history of colorectal malignancies. Denies any psych ENT  GYN problems, has a loop recorder, history of loop recorder follow-up sees cardiology visit in next few days. Diet medications, more than 10 systems reviewed. REVIEW OF SYSTEMS:    CONSTITUTIONAL: Denies any fever, chills, rigors, and weight loss. HEENT: No earache or tinnitus. CARDIOVASCULAR: No chest pain or palpitations.    RESPIRATORY: Denies any cough, hemoptysis, shortness of breath or dyspnea on exertion. GASTROINTESTINAL: As noted in the History of Present Illness. GENITOURINARY: Denies any hematuria or dysuria. NEUROLOGIC: No dizziness or vertigo. MUSCULOSKELETAL: Denies any joint swellings. SKIN: Denies skin rashes or itching. ENDOCRINE: Denies excessive thirst. Denies intolerance to heat or cold. PSYCHOSOCIAL: Denies depression or anxiety. Denies any recent memory loss. Historical Information   Past Medical History:   Diagnosis Date   • Allergic    • Asthma    • Disease of thyroid gland    • Endometriosis    • High cholesterol    • Hypertension 7/15/2021    Normally no but on 7/15 during an episode of slurred speech   • Hypothyroidism    • Obesity    • Sjogren's syndrome ([address])    • Stroke ([address])     Found out 7/15/2021 with an MRI     Past Surgical History:   Procedure Laterality Date   •  SECTION      x2   • HEMORROIDECTOMY     • MAMMO (HISTORICAL) Bilateral 2017   • WISDOM TOOTH EXTRACTION       Social History   Social History     Substance and Sexual Activity   Alcohol Use Not Currently     Social History     Substance and Sexual Activity   Drug Use Never     Social History     Tobacco Use   Smoking Status Never   • Passive exposure: Past   Smokeless Tobacco Never     Family History   Problem Relation Age of Onset   • Heart disease Mother    • Diabetes Mother    • Hypertension Mother    • Asthma Mother    • Heart disease Father    • Hypertension Father    • Depression Sister    • Mental illness Sister         Bipolar manic depressant.    • Bipolar disorder Sister    • Drug abuse Sister    • No Known Problems Daughter    • No Known Problems Daughter    • Cancer Maternal Grandmother    • Stroke Maternal Grandmother    • Stroke Maternal Grandfather    • No Known Problems Paternal Grandmother    • No Known Problems Paternal Grandfather    • No Known Problems Maternal Aunt    • No Known Problems Paternal Aunt        Meds/Allergies       Current Outpatient Medications:   • albuterol (PROVENTIL HFA,VENTOLIN HFA) 90 mcg/act inhaler  •  atorvastatin (LIPITOR) 40 mg tablet  •  clopidogrel (PLAVIX) 75 mg tablet  •  loratadine (CLARITIN) 10 mg tablet  •  Mometasone Furoate (Asmanex HFA) 100 MCG/ACT AERO  •  Omega-3 Fatty Acids (fish oil) 1,000 mg  •  Pediatric Multiple Vitamins (PC PEDIATRIC POLY-VITAMIN DROP PO)  •  polyethylene glycol (Golytely) 4000 mL solution  •  Synthroid 112 MCG tablet  •  triamcinolone (KENALOG) 0.1 % cream  •  valsartan-hydrochlorothiazide (DIOVAN-HCT) 160-12.5 MG per tablet    Allergies   Allergen Reactions   • Asa [Aspirin]    • Avelox [Moxifloxacin]            Objective     Blood pressure 122/76, pulse 73, height 5' 3" (1.6 m), weight 86.2 kg (190 lb), last menstrual period 04/19/2023, not currently breastfeeding. Body mass index is 33.66 kg/m². PHYSICAL EXAM:      General Appearance:   Alert, cooperative, no distress   HEENT:   Normocephalic, atraumatic, anicteric. Neck:  Supple, symmetrical, trachea midline   Lungs:   Clear to auscultation bilaterally; no rales, rhonchi or wheezing; respirations unlabored    Heart[de-identified]   Regular rate and rhythm; no murmur. Abdomen:   Soft, non-tender, non-distended; normal bowel sounds; no masses, no organomegaly    Genitalia:   Deferred    Rectal:   Deferred    Extremities:  No cyanosis, clubbing or edema    Skin:  No jaundice, rashes, or lesions    Lymph nodes:  No palpable cervical lymphadenopathy        Lab Results:   No visits with results within 1 Day(s) from this visit.    Latest known visit with results is:   Orders Only on 07/07/2023   Component Date Value   • Pap, Liquid Based 07/07/2023 NILM    • ROCHE-HPV_NON_16_18 07/07/2023 Not Detected    • ROCHE-HPV18 07/07/2023 Not Detected    • Negro Rosales 07/07/2023 Not Detected          Radiology Results:   Mammo screening bilateral w 3d & cad    Result Date: 7/28/2023  Narrative: DIAGNOSIS: Screening mammogram for high-risk patient; Encounter for screening mammogram for malignant neoplasm of breast TECHNIQUE: Digital screening mammography was performed. Computer Aided Detection (CAD) analyzed all applicable images. COMPARISONS: Prior breast imaging dated: 07/25/2022, 07/23/2021, 06/22/2020, 06/07/2019, 06/05/2018, 06/02/2017, 05/31/2016, 05/22/2015, and 05/20/2014 RELEVANT HISTORY: Family Breast Cancer History: No known family history of breast cancer. Family Medical History: No known relevant family medical history. Personal History: Hormone history includes birth control. No known relevant surgical history. No known relevant medical history. The patient is scheduled in a reminder system for screening mammography. 8-10% of cancers will be missed on mammography. Management of a palpable abnormality must be based on clinical grounds. Patients will be notified of their results via letter from our facility. Accredited by [employer] of Radiology and FDA. RISK ASSESSMENT: 5 Year Tyrer-Cuzick: 1.1 % 10 Year Tyrer-Cuzick: 2.32 % Lifetime Tyrer-Cuzick: 10.45 % TISSUE DENSITY: There are scattered areas of fibroglandular density. INDICATION: Odell Meeks is a 50 y.o. female presenting for screening mammography. FINDINGS: Bilateral There are no suspicious masses, grouped microcalcifications or areas of unexplained architectural distortion. The skin and nipple areolar complex are unremarkable. Implanted medical device overlies the left breast.  Benign-appearing calcifications are noted in each breast.     Impression: No mammographic evidence of malignancy. ASSESSMENT/BI-RADS CATEGORY: Left: 2 - Benign Right: 2 - Benign Overall: 2 - Benign RECOMMENDATION:      - Routine screening mammogram in 1 year for both breasts.  Workstation ID: [id number]

## 2023-08-02 DIAGNOSIS — I10 BENIGN ESSENTIAL HYPERTENSION: ICD-10-CM

## 2023-08-02 RX ORDER — VALSARTAN AND HYDROCHLOROTHIAZIDE 160; 12.5 MG/1; MG/1
1 TABLET, FILM COATED ORAL DAILY
Qty: 90 TABLET | Refills: 0 | Status: SHIPPED | OUTPATIENT
Start: 2023-08-02

## 2023-08-02 NOTE — TELEPHONE ENCOUNTER
Received an Rx fax sheet from [address] [place] [address] stating patient needs a refill on Valsartan - HCTZ 12.5 mg. Medication was sent for fill at this time.

## 2023-08-21 ENCOUNTER — ANESTHESIA EVENT (OUTPATIENT)
Dept: ANESTHESIOLOGY | Facility: AMBULATORY SURGERY CENTER | Age: 48
End: 2023-08-21

## 2023-08-21 ENCOUNTER — ANESTHESIA (OUTPATIENT)
Dept: ANESTHESIOLOGY | Facility: AMBULATORY SURGERY CENTER | Age: 48
End: 2023-08-21

## 2023-08-26 DIAGNOSIS — I63.9 STROKE (HCC): ICD-10-CM

## 2023-08-28 RX ORDER — ATORVASTATIN CALCIUM 40 MG/1
40 TABLET, FILM COATED ORAL EVERY EVENING
Qty: 90 TABLET | Refills: 0 | Status: SHIPPED | OUTPATIENT
Start: 2023-08-28

## 2023-09-01 ENCOUNTER — TELEPHONE (OUTPATIENT)
Dept: GASTROENTEROLOGY | Facility: CLINIC | Age: 48
End: 2023-09-01

## 2023-09-01 NOTE — TELEPHONE ENCOUNTER
Spoke to pt confirming pt's colonoscopy scheduled on 9/11/23 at [facility] ER with Dr. Twan Hickman.  Informed [facility] would be calling 1-2 days prior with the arrival time. Pt has instructions and did not have any questions. However, when reviewing pt's chart she is taking Plavix. Will fax urgent Plavix clearance to Dr. Boni Parrish.

## 2023-09-01 NOTE — TELEPHONE ENCOUNTER
Dr. Ivelisse Graff patient is scheduled for procedure: colohnoscopy at [address] [facility]     On: __9__/_ 11   _/_23    _      With: Dr. Green________    He/She is taking the following blood thinner:   Plavix       Can this be stopped __5____ days prior to the procedure?       Physician Approving clearance: ________________________

## 2023-09-05 NOTE — TELEPHONE ENCOUNTER
Patients GI provider:  Dr. Nuvia Núñez    Number to return call: [phone]    Reason for call: Pt returning call. Pt received message about her plavix.     Scheduled procedure/appointment date if applicable: [id number]

## 2023-09-05 NOTE — TELEPHONE ENCOUNTER
Clearance received back from Dr. Mack Jensen. Pt is cleared to hold Plavix 5 days prior to the procedure. Will call pt to inform.

## 2023-09-05 NOTE — TELEPHONE ENCOUNTER
Dr. Rohan Donis     Our mutual patient is scheduled for procedure: colohnoscopy at [address] [place] [address]      On: __9__/_ 11   _/_23    _       With: Dr. Green________     He/She is taking the following blood thinner:       Plavix                               Can this be stopped __5____ days prior to the procedure?       Physician Approving clearance: rosa maria kelley

## 2023-09-05 NOTE — TELEPHONE ENCOUNTER
I lmom informing pt that we received clearance from Dr. Jessika Blue for her to hold her Plavix 5 days prior to the procedure. I asked pt to please call me back to let me know that she received my message. Will call pt again tomorrow if do not hear back from her.

## 2023-09-11 ENCOUNTER — ANESTHESIA EVENT (OUTPATIENT)
Dept: GASTROENTEROLOGY | Facility: AMBULATORY SURGERY CENTER | Age: 48
End: 2023-09-11

## 2023-09-11 ENCOUNTER — HOSPITAL ENCOUNTER (OUTPATIENT)
Dept: GASTROENTEROLOGY | Facility: AMBULATORY SURGERY CENTER | Age: 48
Discharge: HOME | End: 2023-09-11
Payer: COMMERCIAL

## 2023-09-11 ENCOUNTER — ANESTHESIA (OUTPATIENT)
Dept: GASTROENTEROLOGY | Facility: AMBULATORY SURGERY CENTER | Age: 48
End: 2023-09-11

## 2023-09-11 VITALS
OXYGEN SATURATION: 99 % | SYSTOLIC BLOOD PRESSURE: 120 MMHG | DIASTOLIC BLOOD PRESSURE: 59 MMHG | WEIGHT: 190 LBS | RESPIRATION RATE: 18 BRPM | HEIGHT: 63 IN | BODY MASS INDEX: 33.66 KG/M2 | TEMPERATURE: 97.2 F | HEART RATE: 77 BPM

## 2023-09-11 DIAGNOSIS — Z12.11 SCREEN FOR COLON CANCER: ICD-10-CM

## 2023-09-11 DIAGNOSIS — Z12.12 SCREENING FOR RECTAL CANCER: ICD-10-CM

## 2023-09-11 PROBLEM — M54.81 OCCIPITAL NEURALGIA OF LEFT SIDE: Status: ACTIVE | Noted: 2022-02-15

## 2023-09-11 PROBLEM — I67.4 HYPERTENSIVE ENCEPHALOPATHY: Status: ACTIVE | Noted: 2022-02-15

## 2023-09-11 PROBLEM — M54.16 LUMBAR RADICULOPATHY: Status: ACTIVE | Noted: 2020-10-30

## 2023-09-11 PROBLEM — E78.5 DYSLIPIDEMIA: Status: ACTIVE | Noted: 2021-10-12

## 2023-09-11 LAB
EXT PREGNANCY TEST URINE: NEGATIVE
EXT. CONTROL: NORMAL

## 2023-09-11 PROCEDURE — G0121 COLON CA SCRN NOT HI RSK IND: HCPCS | Performed by: INTERNAL MEDICINE

## 2023-09-11 RX ORDER — SODIUM CHLORIDE, SODIUM LACTATE, POTASSIUM CHLORIDE, CALCIUM CHLORIDE 600; 310; 30; 20 MG/100ML; MG/100ML; MG/100ML; MG/100ML
INJECTION, SOLUTION INTRAVENOUS CONTINUOUS PRN
Status: DISCONTINUED | OUTPATIENT
Start: 2023-09-11 | End: 2023-09-11

## 2023-09-11 RX ORDER — PROPOFOL 10 MG/ML
INJECTION, EMULSION INTRAVENOUS AS NEEDED
Status: DISCONTINUED | OUTPATIENT
Start: 2023-09-11 | End: 2023-09-11

## 2023-09-11 RX ADMIN — PROPOFOL 120 MG: 10 INJECTION, EMULSION INTRAVENOUS at 08:50

## 2023-09-11 RX ADMIN — PROPOFOL 40 MG: 10 INJECTION, EMULSION INTRAVENOUS at 08:53

## 2023-09-11 RX ADMIN — PROPOFOL 50 MG: 10 INJECTION, EMULSION INTRAVENOUS at 08:56

## 2023-09-11 RX ADMIN — PROPOFOL 50 MG: 10 INJECTION, EMULSION INTRAVENOUS at 08:59

## 2023-09-11 RX ADMIN — SODIUM CHLORIDE, SODIUM LACTATE, POTASSIUM CHLORIDE, CALCIUM CHLORIDE: 600; 310; 30; 20 INJECTION, SOLUTION INTRAVENOUS at 08:44

## 2023-09-11 NOTE — INTERVAL H&P NOTE
H&P reviewed. After examining the patient I find no changes in the patients condition since the H&P had been written.     Vitals:    09/11/23 0915   BP: (!) 88/50   Pulse: 70   Resp: 18   Temp:    SpO2: 99%

## 2023-09-11 NOTE — ANESTHESIA POSTPROCEDURE EVALUATION
Post-Op Assessment Note    CV Status:  Stable    Pain management: adequate     Mental Status:  Sleepy   Hydration Status:  Euvolemic   PONV Controlled:  Controlled   Airway Patency:  Patent      Post Op Vitals Reviewed: Yes      Staff: Anesthesiologist         There were no known notable events for this encounter.     BP (!) 82/48 (09/11/23 0905)    Temp     Pulse 69 (09/11/23 0905)   Resp 18 (09/11/23 0905)    SpO2 97 % (09/11/23 0905)

## 2023-09-11 NOTE — H&P
History and Physical -  [facility]  Miranda Washburn 50 y.o. female MRN: [id number]                  HPI: Miranda Washburn is a 50y.o. year old female who presents for screening colonoscopy      REVIEW OF SYSTEMS: Per the HPI, and otherwise unremarkable. Historical Information   Past Medical History:   Diagnosis Date   • Allergic  seasonal    • Arthritis    • Asthma    • Disease of thyroid gland    • Endometriosis    • High cholesterol    • Hypertension 7/15/2021    Normally no but on 7/15 during an episode of slurred speech   • Hypothyroidism    • Obesity    • Sjogren's syndrome ([address])    • Stroke ([address])     Found out 7/15/2021 with an MRI     Past Surgical History:   Procedure Laterality Date   • CARDIAC LOOP RECORDER     •  SECTION      x2   • COLONOSCOPY     • HEMORROIDECTOMY     • MAMMO (HISTORICAL) Bilateral 2017   • SKIN BIOPSY     • WISDOM TOOTH EXTRACTION       Social History   Social History     Substance and Sexual Activity   Alcohol Use Not Currently     Social History     Substance and Sexual Activity   Drug Use Never     Social History     Tobacco Use   Smoking Status Never   • Passive exposure: Past   Smokeless Tobacco Never     Family History   Problem Relation Age of Onset   • Heart disease Mother    • Diabetes Mother    • Hypertension Mother    • Asthma Mother    • Heart disease Father    • Hypertension Father    • Depression Sister    • Mental illness Sister         Bipolar manic depressant.    • Bipolar disorder Sister    • Drug abuse Sister    • No Known Problems Daughter    • No Known Problems Daughter    • Cancer Maternal Grandmother    • Stroke Maternal Grandmother    • Stroke Maternal Grandfather    • No Known Problems Paternal Grandmother    • No Known Problems Paternal Grandfather    • No Known Problems Maternal Aunt    • No Known Problems Paternal Aunt        Meds/Allergies       Current Outpatient Medications:   •  atorvastatin (LIPITOR) 40 mg tablet  • loratadine (CLARITIN) 10 mg tablet  •  Mometasone Furoate (Asmanex HFA) 100 MCG/ACT AERO  •  Omega-3 Fatty Acids (fish oil) 1,000 mg  •  Pediatric Multiple Vitamins (PC PEDIATRIC POLY-VITAMIN DROP PO)  •  Synthroid 112 MCG tablet  •  triamcinolone (KENALOG) 0.1 % cream  •  valsartan-hydrochlorothiazide (DIOVAN-HCT) 160-12.5 MG per tablet  •  albuterol (PROVENTIL HFA,VENTOLIN HFA) 90 mcg/act inhaler  •  clopidogrel (PLAVIX) 75 mg tablet    Allergies   Allergen Reactions   • Asa [Aspirin]    • Avelox [Moxifloxacin]        Objective     /74   Pulse 68   Temp (!) 97.2 °F (36.2 °C) (Temporal)   Resp 18   Ht 5' 3" (1.6 m)   Wt 86.2 kg (190 lb)   LMP 04/19/2023 (Approximate)   SpO2 99%   BMI 33.66 kg/m²       PHYSICAL EXAM    Gen: NAD  Head: NCAT  CV: RRR  CHEST: Clear  ABD: soft, NT/ND  EXT: no edema      ASSESSMENT/PLAN:  This is a 50y.o. year old female here for colonoscopy, and she is stable and optimized for her procedure.

## 2023-09-11 NOTE — ANESTHESIA PREPROCEDURE EVALUATION
Procedure:  COLONOSCOPY    Relevant Problems   CARDIO   (+) Benign essential hypertension   (+) Mixed hyperlipidemia      ENDO   (+) Idiopathic atrophic hypothyroidism      NEURO/PSYCH   (+) Stroke (HCC)      PULMONARY   (+) Mild intermittent asthma without complication      Nervous and Auditory   (+) Bilateral carpal tunnel syndrome   (+) Hypertensive encephalopathy   (+) Lumbar radiculopathy      Other   (+) Aspirin allergy   (+) Dyslipidemia   (+) Endometriosis   (+) Obesity (BMI 30-39.9)   (+) Occipital neuralgia of left side   (+) Sjogren's syndrome (HCC)   (+) Status post placement of implantable loop recorder      Lab Results   Component Value Date     07/13/2015    SODIUM 140 09/25/2021    K 4.0 09/25/2021     09/25/2021    CO2 25 09/25/2021    ANIONGAP 7 07/13/2015    AGAP 10 09/25/2021    BUN 19 11/13/2021    CREATININE 0.79 11/13/2021    GLUC 95 09/25/2021    GLUF 81 08/02/2021    CALCIUM 8.6 09/25/2021    AST 50 (H) 11/13/2021    ALT 65 11/13/2021    ALKPHOS 66 11/13/2021    PROT 8.3 (H) 07/13/2015    TP 8.0 11/13/2021    BILITOT 0.69 07/13/2015    TBILI 0.72 11/13/2021    EGFR 89 11/13/2021     Lab Results   Component Value Date    WBC 4.21 (L) 11/13/2021    HGB 11.5 11/13/2021    HCT 38.0 11/13/2021    MCV 88 11/13/2021     11/13/2021         Physical Exam    Airway    Mallampati score: II  TM Distance: >3 FB  Neck ROM: full     Dental       Cardiovascular      Pulmonary      Other Findings        Anesthesia Plan  ASA Score- 3     Anesthesia Type- IV sedation with anesthesia with ASA Monitors. Additional Monitors:   Airway Plan:           Plan Factors-Exercise tolerance (METS): >4 METS. Chart reviewed. EKG reviewed. Imaging results reviewed. Existing labs reviewed. Patient summary reviewed. Induction-     Postoperative Plan-     Informed Consent- Anesthetic plan and risks discussed with patient. I personally reviewed this patient with the CRNA.  Discussed and agreed on the Anesthesia Plan with the CRNA. Temitope Saini

## 2023-09-19 DIAGNOSIS — I63.9 STROKE (HCC): ICD-10-CM

## 2023-09-19 RX ORDER — CLOPIDOGREL BISULFATE 75 MG/1
TABLET ORAL
Qty: 90 TABLET | Refills: 0 | Status: SHIPPED | OUTPATIENT
Start: 2023-09-19

## 2023-10-20 ENCOUNTER — RA CDI HCC (OUTPATIENT)
Dept: OTHER | Facility: HOSPITAL | Age: 48
End: 2023-10-20

## 2023-10-20 NOTE — PROGRESS NOTES
[address] coding opportunities       Chart reviewed, no opportunity found: CHART REVIEWED, NO OPPORTUNITY FOUND        Patients Insurance        Commercial Insurance: Cristian Plata
spouse

## 2023-10-27 ENCOUNTER — OFFICE VISIT (OUTPATIENT)
Dept: FAMILY MEDICINE CLINIC | Facility: CLINIC | Age: 48
End: 2023-10-27
Payer: COMMERCIAL

## 2023-10-27 VITALS
DIASTOLIC BLOOD PRESSURE: 81 MMHG | BODY MASS INDEX: 34.38 KG/M2 | HEART RATE: 81 BPM | WEIGHT: 194 LBS | SYSTOLIC BLOOD PRESSURE: 128 MMHG | TEMPERATURE: 97.4 F | RESPIRATION RATE: 16 BRPM | HEIGHT: 63 IN | OXYGEN SATURATION: 97 %

## 2023-10-27 DIAGNOSIS — M35.00 SJOGREN'S SYNDROME, WITH UNSPECIFIED ORGAN INVOLVEMENT (HCC): ICD-10-CM

## 2023-10-27 DIAGNOSIS — I10 BENIGN ESSENTIAL HYPERTENSION: Primary | ICD-10-CM

## 2023-10-27 DIAGNOSIS — E78.2 MIXED HYPERLIPIDEMIA: ICD-10-CM

## 2023-10-27 DIAGNOSIS — E55.9 VITAMIN D DEFICIENCY: ICD-10-CM

## 2023-10-27 DIAGNOSIS — E03.4 IDIOPATHIC ATROPHIC HYPOTHYROIDISM: ICD-10-CM

## 2023-10-27 DIAGNOSIS — I63.9 CEREBROVASCULAR ACCIDENT (CVA), UNSPECIFIED MECHANISM (HCC): ICD-10-CM

## 2023-10-27 DIAGNOSIS — J45.20 MILD INTERMITTENT ASTHMA WITHOUT COMPLICATION: ICD-10-CM

## 2023-10-27 PROBLEM — E78.5 DYSLIPIDEMIA: Status: RESOLVED | Noted: 2021-10-12 | Resolved: 2023-10-27

## 2023-10-27 PROCEDURE — 99213 OFFICE O/P EST LOW 20 MIN: CPT | Performed by: INTERNAL MEDICINE

## 2023-10-27 RX ORDER — VALSARTAN AND HYDROCHLOROTHIAZIDE 160; 12.5 MG/1; MG/1
1 TABLET, FILM COATED ORAL DAILY
Qty: 90 TABLET | Refills: 1 | Status: SHIPPED | OUTPATIENT
Start: 2023-10-27

## 2023-10-27 NOTE — PROGRESS NOTES
Assessment/Plan:       Problem List Items Addressed This Visit       Vitamin D deficiency    Stroke ([address])     2 years ago, continue on Plavix  Continue statin  Follow up at next visit         Sjogren's syndrome ([address])     Under control  Follows with rheumatology         Benign essential hypertension - Primary    Relevant Medications    valsartan-hydrochlorothiazide (DIOVAN-HCT) 160-12.5 MG per tablet    Other Relevant Orders    CBC and differential    Comprehensive metabolic panel    Idiopathic atrophic hypothyroidism     Check TSH before next visit  TSH is at goal during last check  Continue same medication dose for now         Relevant Orders    TSH, 3rd generation    Mixed hyperlipidemia     Controlled with statin  Check lipids yearly         Relevant Orders    Lipid panel    Mild intermittent asthma without complication     Stable with albuterol prn  Recheck at next visit              Subjective:     Chief Complaint   Patient presents with    Follow-up     3 month follow up          Patient ID: Miranda Washburn is a 50 y.o. female who is here for a follow up. She denies any issues with her medications. Patient has a history of hypertension treated with valsartan-HCTZ. They are compliant with medications. Denies any side effects. Denies chest pain, shortness of breath, headache, vision changes. They do not exercise regularly and admits that diet could be better. Denies any issues with her medications. Patient's past medical history, surgical history, family history, medications, allergies and social history reviewed and updated    Review of Systems   Constitutional:  Negative for chills and fever. HENT:  Negative for congestion, rhinorrhea and sore throat. Eyes:  Negative for visual disturbance. Respiratory:  Negative for cough and shortness of breath. Cardiovascular:  Negative for chest pain.    Gastrointestinal:  Negative for abdominal distention, constipation, diarrhea, nausea and vomiting. Endocrine: Negative for polyuria. Genitourinary:  Negative for dysuria and frequency. Musculoskeletal:  Negative for back pain. Skin:  Negative for rash. Neurological:  Negative for headaches. Psychiatric/Behavioral:  Negative for dysphoric mood. All other systems reviewed and are negative. All other ROS negative. Objective:    Vitals:    10/27/23 1428   BP: 128/81   Pulse: 81   Resp: 16   Temp: (!) 97.4 °F (36.3 °C)   SpO2: 97%          Physical Exam  Vitals reviewed. Constitutional:       General: She is not in acute distress. HENT:      Right Ear: External ear normal.      Left Ear: External ear normal.      Nose: Nose normal.      Mouth/Throat:      Mouth: Mucous membranes are moist.   Eyes:      Conjunctiva/sclera: Conjunctivae normal.   Cardiovascular:      Rate and Rhythm: Normal rate and regular rhythm. Heart sounds: No murmur heard. Pulmonary:      Effort: Pulmonary effort is normal. No respiratory distress. Breath sounds: No wheezing. Abdominal:      General: There is no distension. Palpations: Abdomen is soft. Tenderness: There is no abdominal tenderness. Musculoskeletal:      Right lower leg: No edema. Left lower leg: No edema. Lymphadenopathy:      Cervical: No cervical adenopathy. Neurological:      Mental Status: She is alert and oriented to person, place, and time. Psychiatric:         Mood and Affect: Mood normal.               Portions of the record may have been created with voice recognition software. Occasional wrong word or "sound a like" substitutions may have occurred due to the inherent limitations of voice recognition software. Read the chart carefully and recognize, using context, where substitutions have occurred.      John Almazan MD  Internal Medicine and Pediatrics

## 2023-10-30 PROBLEM — I67.4 HYPERTENSIVE ENCEPHALOPATHY: Status: RESOLVED | Noted: 2022-02-15 | Resolved: 2023-10-30

## 2023-10-30 NOTE — ASSESSMENT & PLAN NOTE
Check TSH before next visit  TSH is at goal during last check  Continue same medication dose for now

## 2023-12-02 DIAGNOSIS — I63.9 STROKE (HCC): ICD-10-CM

## 2023-12-04 RX ORDER — ATORVASTATIN CALCIUM 40 MG/1
40 TABLET, FILM COATED ORAL EVERY EVENING
Qty: 90 TABLET | Refills: 0 | Status: SHIPPED | OUTPATIENT
Start: 2023-12-04

## 2023-12-14 ENCOUNTER — NURSE TRIAGE (OUTPATIENT)
Age: 48
End: 2023-12-14

## 2023-12-14 ENCOUNTER — TELEPHONE (OUTPATIENT)
Dept: FAMILY MEDICINE CLINIC | Facility: CLINIC | Age: 48
End: 2023-12-14

## 2023-12-14 NOTE — TELEPHONE ENCOUNTER
Spoke with Carina-she wanted an earlier appt. I spoke with the Dr he said to come at 3:20- HOWEVER after speaking with her I advised she go to the nearest ER for evaluation. She is extremely weak and SOB - Can not ambulate to room to room with out exacerbating SOB.     She will call her  to take her to the ER

## 2023-12-14 NOTE — TELEPHONE ENCOUNTER
Pt calling. She has asthma. Yesterday she started with a fever of 102 and some SOB. Today her symptoms are getting worse. She is SOB at rest and with ambulation. She states that her HR is racing when she walks. Pt is using her nebulizer every 4 hours without significant relief. Pt sound winded on the phone as she is talking. Pt will proceed to ED for evaluation. Office called patient while RN was speaking with pt and agreed that pt should proceed to the ED. Her  will drive her. Reason for Disposition   MODERATE difficulty breathing (e.g., speaks in phrases, SOB even at rest, pulse 100-120) of new-onset or worse than normal    Answer Assessment - Initial Assessment Questions  1. RESPIRATORY STATUS: "Describe your breathing?" (e.g., wheezing, shortness of breath, unable to speak, severe coughing)       SOB. cough  2. ONSET: "When did this breathing problem begin?"       yesterday  3. PATTERN "Does the difficult breathing come and go, or has it been constant since it started?"      Constant, worse with ambulation   4. SEVERITY: "How bad is your breathing?" (e.g., mild, moderate, severe)     - MILD: No SOB at rest, mild SOB with walking, speaks normally in sentences, can lay down, no retractions, pulse < 100.     - MODERATE: SOB at rest, SOB with minimal exertion and prefers to sit, cannot lie down flat, speaks in phrases, mild retractions, audible wheezing, pulse 100-120.     - SEVERE: Very SOB at rest, speaks in single words, struggling to breathe, sitting hunched forward, retractions, pulse > 120       Moderate   5. RECURRENT SYMPTOM: "Have you had difficulty breathing before?" If Yes, ask: "When was the last time?" and "What happened that time?"       Yes   6. CARDIAC HISTORY: "Do you have any history of heart disease?" (e.g., heart attack, angina, bypass surgery, angioplasty)       no  7.  LUNG HISTORY: "Do you have any history of lung disease?"  (e.g., pulmonary embolus, asthma, emphysema) Yes, asthma  8. CAUSE: "What do you think is causing the breathing problem?"       I feel sick  9. OTHER SYMPTOMS: "Do you have any other symptoms? (e.g., dizziness, runny nose, cough, chest pain, fever)      Fever of 102  10. PREGNANCY: "Is there any chance you are pregnant?" "When was your last menstrual period?"        no  11.  TRAVEL: "Have you traveled out of the country in the last month?" (e.g., travel history, exposures)        no    Protocols used: Breathing Difficulty-ADULT-[place]

## 2023-12-15 ENCOUNTER — TELEPHONE (OUTPATIENT)
Age: 48
End: 2023-12-15

## 2023-12-15 ENCOUNTER — TELEPHONE (OUTPATIENT)
Dept: FAMILY MEDICINE CLINIC | Facility: CLINIC | Age: 48
End: 2023-12-15

## 2023-12-15 NOTE — TELEPHONE ENCOUNTER
Patient went to the ER and they gave her prednisone 20 mg take 3 tabs daily for 4 days. 12 tabs total.    Patient is a little concerned and would like a titrate down if possible. She stated that she will take 60 mg today but would like  direction for the next couple of days.   Lizette Cat

## 2023-12-15 NOTE — TELEPHONE ENCOUNTER
Called and spoke to patient as she should titrate Prednisone down, Take 60 mg today and tomorrow, 40 mg on days 3 and 4 and 20 mg and days 4 and 5 Patient verbalized iunderstanding

## 2023-12-16 DIAGNOSIS — I63.9 STROKE (HCC): ICD-10-CM

## 2023-12-18 RX ORDER — CLOPIDOGREL BISULFATE 75 MG/1
TABLET ORAL
Qty: 90 TABLET | Refills: 0 | Status: SHIPPED | OUTPATIENT
Start: 2023-12-18

## 2024-01-24 ENCOUNTER — TELEPHONE (OUTPATIENT)
Dept: ENDOCRINOLOGY | Facility: CLINIC | Age: 49
End: 2024-01-24

## 2024-03-08 DIAGNOSIS — I63.9 STROKE (HCC): ICD-10-CM

## 2024-03-08 RX ORDER — ATORVASTATIN CALCIUM 40 MG/1
40 TABLET, FILM COATED ORAL EVERY EVENING
Qty: 90 TABLET | Refills: 0 | Status: SHIPPED | OUTPATIENT
Start: 2024-03-08

## 2024-03-24 DIAGNOSIS — I63.9 STROKE (HCC): ICD-10-CM

## 2024-03-25 RX ORDER — CLOPIDOGREL BISULFATE 75 MG/1
TABLET ORAL
Qty: 90 TABLET | Refills: 1 | Status: SHIPPED | OUTPATIENT
Start: 2024-03-25

## 2024-04-21 DIAGNOSIS — I10 BENIGN ESSENTIAL HYPERTENSION: ICD-10-CM

## 2024-04-22 ENCOUNTER — RA CDI HCC (OUTPATIENT)
Dept: OTHER | Facility: HOSPITAL | Age: 49
End: 2024-04-22

## 2024-04-22 PROBLEM — Z86.73 HISTORY OF STROKE: Status: ACTIVE | Noted: 2024-04-22

## 2024-04-22 PROBLEM — Z86.73 HISTORY OF STROKE: Status: ACTIVE | Noted: 2021-07-16

## 2024-04-22 RX ORDER — VALSARTAN AND HYDROCHLOROTHIAZIDE 160; 12.5 MG/1; MG/1
1 TABLET, FILM COATED ORAL DAILY
Qty: 90 TABLET | Refills: 1 | Status: SHIPPED | OUTPATIENT
Start: 2024-04-22

## 2024-04-22 NOTE — PROGRESS NOTES
HCC coding opportunities          Chart Reviewed number of suggestions sent to Provider: 1   J45.20      Patients Insurance        Commercial Insurance: [employer]

## 2024-04-29 ENCOUNTER — OFFICE VISIT (OUTPATIENT)
Dept: FAMILY MEDICINE CLINIC | Facility: CLINIC | Age: 49
End: 2024-04-29
Payer: COMMERCIAL

## 2024-04-29 VITALS
TEMPERATURE: 97.6 F | BODY MASS INDEX: 35.44 KG/M2 | RESPIRATION RATE: 18 BRPM | WEIGHT: 200 LBS | HEART RATE: 73 BPM | SYSTOLIC BLOOD PRESSURE: 116 MMHG | OXYGEN SATURATION: 96 % | DIASTOLIC BLOOD PRESSURE: 80 MMHG | HEIGHT: 63 IN

## 2024-04-29 DIAGNOSIS — M35.00 SJOGREN'S SYNDROME, WITH UNSPECIFIED ORGAN INVOLVEMENT (HCC): ICD-10-CM

## 2024-04-29 DIAGNOSIS — Z00.00 ANNUAL PHYSICAL EXAM: Primary | ICD-10-CM

## 2024-04-29 PROCEDURE — 3725F SCREEN DEPRESSION PERFORMED: CPT

## 2024-04-29 PROCEDURE — 99396 PREV VISIT EST AGE 40-64: CPT

## 2024-04-29 NOTE — PATIENT INSTRUCTIONS

## 2024-04-29 NOTE — PROGRESS NOTES
ADULT ANNUAL PHYSICAL  [facility]    NAME: Carina Seymour  AGE: 48 y.o. SEX: female  : 1975     DATE: 2024     Assessment and Plan:     Problem List Items Addressed This Visit     Sjogren's syndrome (HCC)   Other Visit Diagnoses     Annual physical exam    -  Primary            Immunizations and preventive care screenings were discussed with patient today. Appropriate education was printed on patient's after visit summary.    Counseling:  Exercise: the importance of regular exercise/physical activity was discussed. Recommend exercise 3-5 times per week for at least 30 minutes.       Depression Screening and Follow-up Plan: Patient was screened for depression during today's encounter. They screened negative with a PHQ-2 score of 0.        Return in about 3 months (around 2024).     Chief Complaint:     Chief Complaint   Patient presents with   • Physical Exam     Yearly physical       History of Present Illness:     Adult Annual Physical   Patient here for a comprehensive physical exam. The patient reports no problems.    Diet and Physical Activity  Diet/Nutrition: well balanced diet, limited junk food, consuming 3-5 servings of fruits/vegetables daily, and adequate fiber intake.   Exercise: walking, 5-7 times a week on average, and 30-60 minutes on average.      Depression Screening  PHQ-2/9 Depression Screening    Little interest or pleasure in doing things: 0 - not at all  Feeling down, depressed, or hopeless: 0 - not at all  PHQ-2 Score: 0  PHQ-2 Interpretation: Negative depression screen       General Health  Sleep: sleeps well.   Hearing: normal - bilateral.  Vision: no vision problems, goes for regular eye exams, and wears glasses.   Dental: regular dental visits, brushes teeth twice daily, and flosses teeth occasionally.       /GYN Health  Follows with gynecology? yes   Patient is: postmenopausal  Last menstrual period:  2023  Contraceptive method:  none .    Advanced Care Planning  Do you have an advanced directive? no  Do you have a durable medical power of ? no  ACP document given to the patient? no     Review of Systems:     Review of Systems   Constitutional:  Negative for chills, fatigue and fever.   HENT:  Negative for congestion, ear pain, rhinorrhea, sneezing and sore throat.    Eyes:  Negative for redness, itching and visual disturbance.   Respiratory:  Negative for cough, chest tightness and shortness of breath.    Cardiovascular:  Negative for chest pain, palpitations and leg swelling.   Gastrointestinal:  Negative for abdominal pain, blood in stool, diarrhea, nausea and vomiting.   Endocrine: Negative for cold intolerance and heat intolerance.   Genitourinary:  Negative for dysuria, frequency and urgency.   Musculoskeletal:  Positive for arthralgias. Negative for back pain and myalgias.   Skin:  Negative for color change and rash.   Neurological:  Negative for dizziness, weakness, light-headedness, numbness and headaches.   Hematological:  Does not bruise/bleed easily.   Psychiatric/Behavioral:  Negative for agitation, behavioral problems and confusion.       Past Medical History:     Past Medical History:   Diagnosis Date   • Allergic  seasonal    • Arthritis    • Asthma    • Disease of thyroid gland    • Endometriosis    • High cholesterol    • Hypertension 7/15/2021    Normally no but on 7/15 during an episode of slurred speech   • Hypothyroidism    • Obesity    • Sjogren's syndrome (HCC)    • Stroke (HCC)     Found out 7/15/2021 with an MRI      Past Surgical History:     Past Surgical History:   Procedure Laterality Date   • CARDIAC LOOP RECORDER     •  SECTION      x2   • COLONOSCOPY     • HEMORROIDECTOMY     • MAMMO (HISTORICAL) Bilateral 2017   • SKIN BIOPSY     • TUBAL LIGATION  2010   • WISDOM TOOTH EXTRACTION        Social History:     Social History     Socioeconomic History    • Marital status: /Civil Union     Spouse name: None   • Number of children: None   • Years of education: None   • Highest education level: None   Occupational History   • None   Tobacco Use   • Smoking status: Never     Passive exposure: Past   • Smokeless tobacco: Never   Vaping Use   • Vaping status: Never Used   Substance and Sexual Activity   • Alcohol use: Not Currently   • Drug use: Never   • Sexual activity: Yes     Partners: Male     Birth control/protection: None   Other Topics Concern   • None   Social History Narrative   • None     Social Determinants of Health     Financial Resource Strain: Not on file   Food Insecurity: Not on file   Transportation Needs: Not on file   Physical Activity: Not on file   Stress: Not on file   Social Connections: Not on file   Intimate Partner Violence: Not on file   Housing Stability: Not on file      Family History:     Family History   Problem Relation Age of Onset   • Heart disease Mother    • Diabetes Mother    • Hypertension Mother    • Asthma Mother    • Heart disease Father    • Hypertension Father    • Dementia Father    • Depression Sister    • Mental illness Sister         Bipolar manic depressant.   • Bipolar disorder Sister    • Drug abuse Sister    • No Known Problems Daughter    • No Known Problems Daughter    • Cancer Maternal Grandmother    • Stroke Maternal Grandmother    • Stroke Maternal Grandfather    • No Known Problems Paternal Grandmother    • No Known Problems Paternal Grandfather    • No Known Problems Maternal Aunt    • No Known Problems Paternal Aunt       Current Medications:     Current Outpatient Medications   Medication Sig Dispense Refill   • albuterol (PROVENTIL HFA,VENTOLIN HFA) 90 mcg/act inhaler albuterol sulfate HFA 90 mcg/actuation aerosol inhaler   INHALE 2 PUFFS EVERY 4 HOURS AS NEEDED. MAY USE 2 PUFFS 20-30 MINUTES BEFORE PHYSICAL EXERTION     • atorvastatin (LIPITOR) 40 mg tablet TAKE 1 TABLET BY MOUTH EVERY DAY IN THE  "EVENING 90 tablet 0   • clopidogrel (PLAVIX) 75 mg tablet TAKE 1 TABLET BY MOUTH EVERY DAY 90 tablet 1   • loratadine (CLARITIN) 10 mg tablet Take 10 mg by mouth daily     • Mometasone Furoate (Asmanex HFA) 100 MCG/ACT AERO Take 100 mcg by mouth 2 (two) times a day     • Pediatric Multiple Vitamins (PC PEDIATRIC POLY-VITAMIN DROP PO) Take 1 drop by mouth in the morning Vitamin D     • Synthroid 112 MCG tablet Take one tablet daily from Monday to Saturday and 1.5 tablet on Sunday 100 tablet 3   • triamcinolone (KENALOG) 0.1 % cream APPLY TO ECZEMA ON BACK TWICE DAILY FOR 2WEEKS THEN AS NEEDED FOR FLARES.     • valsartan-hydrochlorothiazide (DIOVAN-HCT) 160-12.5 MG per tablet TAKE 1 TABLET BY MOUTH EVERY DAY 90 tablet 1     No current facility-administered medications for this visit.      Allergies:     Allergies   Allergen Reactions   • Asa [Aspirin]    • Avelox [Moxifloxacin]       Physical Exam:     /80 (BP Location: Left arm, Patient Position: Sitting, Cuff Size: Standard)   Pulse 73   Temp 97.6 °F (36.4 °C) (Tympanic)   Resp 18   Ht 5' 3\" (1.6 m)   Wt 90.7 kg (200 lb)   SpO2 96%   BMI 35.43 kg/m²     Physical Exam  Vitals and nursing note reviewed.   Constitutional:       General: She is not in acute distress.     Appearance: Normal appearance. She is well-developed. She is obese.   HENT:      Head: Normocephalic and atraumatic.      Right Ear: Tympanic membrane, ear canal and external ear normal. There is no impacted cerumen.      Left Ear: Tympanic membrane, ear canal and external ear normal. There is no impacted cerumen.      Nose: Nose normal. No congestion.      Mouth/Throat:      Mouth: Mucous membranes are moist.      Pharynx: Oropharynx is clear. No posterior oropharyngeal erythema.   Eyes:      Conjunctiva/sclera: Conjunctivae normal.   Cardiovascular:      Rate and Rhythm: Normal rate and regular rhythm.      Pulses: Normal pulses.      Heart sounds: Normal heart sounds. No murmur " heard.  Pulmonary:      Effort: Pulmonary effort is normal. No respiratory distress.      Breath sounds: Normal breath sounds. No wheezing.   Abdominal:      General: Abdomen is flat. Bowel sounds are normal.      Palpations: Abdomen is soft.      Tenderness: There is no abdominal tenderness.   Musculoskeletal:         General: No tenderness. Normal range of motion.      Cervical back: Normal range of motion and neck supple. No rigidity.   Lymphadenopathy:      Cervical: No cervical adenopathy.   Skin:     General: Skin is warm and dry.      Capillary Refill: Capillary refill takes 2 to 3 seconds.   Neurological:      General: No focal deficit present.      Mental Status: She is alert and oriented to person, place, and time. Mental status is at baseline.      Gait: Gait normal.   Psychiatric:         Mood and Affect: Mood normal.         Behavior: Behavior normal.          Melissa Foster MD  [facility]

## 2024-05-07 ENCOUNTER — TELEPHONE (OUTPATIENT)
Age: 49
End: 2024-05-07

## 2024-05-07 NOTE — TELEPHONE ENCOUNTER
Patient called bc her appt time was changed to 9am and then she said she got a second call that mentioned 9:20a. Spoke to sergio and appt will be at 9am on 5/15. Patient informed.

## 2024-05-07 NOTE — TELEPHONE ENCOUNTER
Patient called stated that she  was told  a year ago that  her Appt   was supposed to be 8:40 not 9:00 and want to know  why she was not contacted about the change.

## 2024-05-08 LAB
25(OH)D3+25(OH)D2 SERPL-MCNC: 29 NG/ML (ref 30–100)
T4 FREE SERPL-MCNC: 0.9 NG/DL (ref 0.61–1.12)
TSH SERPL-ACNC: 0.92 UIU/ML (ref 0.45–5.33)

## 2024-05-15 ENCOUNTER — OFFICE VISIT (OUTPATIENT)
Dept: ENDOCRINOLOGY | Facility: CLINIC | Age: 49
End: 2024-05-15
Payer: COMMERCIAL

## 2024-05-15 ENCOUNTER — TELEPHONE (OUTPATIENT)
Age: 49
End: 2024-05-15

## 2024-05-15 VITALS
OXYGEN SATURATION: 96 % | SYSTOLIC BLOOD PRESSURE: 118 MMHG | HEART RATE: 69 BPM | DIASTOLIC BLOOD PRESSURE: 76 MMHG | WEIGHT: 200 LBS | BODY MASS INDEX: 35.44 KG/M2 | HEIGHT: 63 IN

## 2024-05-15 DIAGNOSIS — E66.9 OBESITY (BMI 30-39.9): ICD-10-CM

## 2024-05-15 DIAGNOSIS — E55.9 VITAMIN D DEFICIENCY: Primary | ICD-10-CM

## 2024-05-15 DIAGNOSIS — E03.8 HYPOTHYROIDISM DUE TO HASHIMOTO'S THYROIDITIS: ICD-10-CM

## 2024-05-15 DIAGNOSIS — E06.3 HYPOTHYROIDISM DUE TO HASHIMOTO'S THYROIDITIS: ICD-10-CM

## 2024-05-15 PROCEDURE — 99214 OFFICE O/P EST MOD 30 MIN: CPT | Performed by: INTERNAL MEDICINE

## 2024-05-15 RX ORDER — LEVOTHYROXINE SODIUM 112 MCG
TABLET ORAL
Qty: 100 TABLET | Refills: 3 | Status: SHIPPED | OUTPATIENT
Start: 2024-05-15

## 2024-05-15 NOTE — TELEPHONE ENCOUNTER
Patient had an appt today and forgot to get a note for work.     She stated to put in her mychart and she will print for work.     Any questions please call patient.   
Work note sent to patient's MyChart for today's visit.  
Home

## 2024-05-15 NOTE — PROGRESS NOTES
Carina Seymour 48 y.o. female MRN: [id number]    Encounter: [id number]      Assessment & Plan     Assessment:  This is a 48 y.o.-year-old female with hypothyroidism.    Plan:  Diagnoses and all orders for this visit:  Diagnoses and all orders for this visit:    Vitamin D deficiency  Discussed with patient to take vitamin D3 supplementation 2000 IU daily over-the-counter.  If she gets constipated, she can take MiraLAX at night.  She can follow-up with primary care physician in near future for vitamin D deficiency  Hypothyroidism due to Hashimoto's thyroiditis  Patient is clinically and biochemically euthyroid.  Continue current dose of Synthroid 112 mcg daily from Monday through Saturday and 1.5 tablet on Sunday.  She should continue to take brand Synthroid as her levels are stable on brand Synthroid.  As thyroid condition is stable, she will follow-up with Dr. Foster in future every 6 months for hypothyroidism.  Will send a note to primary care physician  sHe can be referred back to endocrinology on as needed basis  -     Synthroid 112 MCG tablet; Take one tablet daily from Monday to Saturday and 1.5 tablet on Sunday    Obesity (BMI 30-39.9)  Reinforced lifestyle modification and importance of weight loss         CC:   Hypothyroidism     History of Present Illness     HPI:    Carina Seymour is a 48-year-old woman with medical history of hypothyroidism, obesity, vitamin D deficiency is here for follow-up.  For hypothyroidism she takes brand Synthroid 112 mcg daily on empty stomach 1 hour before breakfast from Monday through Saturday and 1 and half tablet on Sunday.  Thyroid function test is within normal range.  She takes Lipitor 40 mg daily as she has history of TIA.  Recently she has not been taking vitamin D3 supplementation as she gets constipation      Lab Results   Component Value Date    [id number] 0.796 09/25/2021    TSH 0.92 05/08/2024         Lab Results   Component Value Date    HGBA1C 5.2  2021         Wt Readings from Last 3 Encounters:   05/15/24 90.7 kg (200 lb)   24 90.7 kg (200 lb)   10/27/23 88 kg (194 lb)        Component      Latest Ref Rng 2024   TSH, POC      0.45 - 5.33 uIU/mL 0.92    25-Hydroxy, Vitamin D      30 - 100 ng/mL 29 (L)    FREE T4      0.61 - 1.12 ng/dL 0.90           Review of Systems    Historical Information   Past Medical History:   Diagnosis Date    Allergic  seasonal     Arthritis     Asthma     Disease of thyroid gland     Endometriosis     High cholesterol     Hypertension 7/15/2021    Normally no but on 7/15 during an episode of slurred speech    Hypothyroidism     Obesity     Sjogren's syndrome (HCC)     Stroke (HCC)     Found out 7/15/2021 with an MRI     Past Surgical History:   Procedure Laterality Date    CARDIAC LOOP RECORDER       SECTION      x2    COLONOSCOPY      HEMORROIDECTOMY      MAMMO (HISTORICAL) Bilateral 2017    SKIN BIOPSY      TUBAL LIGATION  2010    WISDOM TOOTH EXTRACTION       Social History   Social History     Substance and Sexual Activity   Alcohol Use Not Currently     Social History     Substance and Sexual Activity   Drug Use Never     Social History     Tobacco Use   Smoking Status Never    Passive exposure: Past   Smokeless Tobacco Never     Family History:   Family History   Problem Relation Age of Onset    Heart disease Mother     Diabetes Mother     Hypertension Mother     Asthma Mother     Heart disease Father     Hypertension Father     Dementia Father     Depression Sister     Mental illness Sister         Bipolar manic depressant.    Bipolar disorder Sister     Drug abuse Sister     No Known Problems Daughter     No Known Problems Daughter     Cancer Maternal Grandmother     Stroke Maternal Grandmother     Stroke Maternal Grandfather     No Known Problems Paternal Grandmother     No Known Problems Paternal Grandfather     No Known Problems Maternal Aunt     No Known Problems Paternal Aunt   "      Meds/Allergies   Current Outpatient Medications   Medication Sig Dispense Refill    albuterol (PROVENTIL HFA,VENTOLIN HFA) 90 mcg/act inhaler albuterol sulfate HFA 90 mcg/actuation aerosol inhaler   INHALE 2 PUFFS EVERY 4 HOURS AS NEEDED. MAY USE 2 PUFFS 20-30 MINUTES BEFORE PHYSICAL EXERTION      atorvastatin (LIPITOR) 40 mg tablet TAKE 1 TABLET BY MOUTH EVERY DAY IN THE EVENING 90 tablet 0    clopidogrel (PLAVIX) 75 mg tablet TAKE 1 TABLET BY MOUTH EVERY DAY 90 tablet 1    loratadine (CLARITIN) 10 mg tablet Take 10 mg by mouth daily      Mometasone Furoate (Asmanex HFA) 100 MCG/ACT AERO Take 100 mcg by mouth 2 (two) times a day      Synthroid 112 MCG tablet Take one tablet daily from Monday to Saturday and 1.5 tablet on Sunday 100 tablet 3    triamcinolone (KENALOG) 0.1 % cream APPLY TO ECZEMA ON BACK TWICE DAILY FOR 2WEEKS THEN AS NEEDED FOR FLARES.      valsartan-hydrochlorothiazide (DIOVAN-HCT) 160-12.5 MG per tablet TAKE 1 TABLET BY MOUTH EVERY DAY 90 tablet 1    Pediatric Multiple Vitamins (PC PEDIATRIC POLY-VITAMIN DROP PO) Take 1 drop by mouth in the morning Vitamin D (Patient not taking: Reported on 5/15/2024)       No current facility-administered medications for this visit.     Allergies   Allergen Reactions    Asa [Aspirin]     Avelox [Moxifloxacin]        Objective   Vitals: Blood pressure 118/76, pulse 69, height 5' 3\" (1.6 m), weight 90.7 kg (200 lb), SpO2 96%, not currently breastfeeding.    Physical Exam  Constitutional:       General: She is not in acute distress.     Appearance: Normal appearance. She is not ill-appearing.   HENT:      Head: Normocephalic and atraumatic.      Nose: Nose normal.   Eyes:      Extraocular Movements: Extraocular movements intact.      Conjunctiva/sclera: Conjunctivae normal.   Pulmonary:      Effort: No respiratory distress.   Musculoskeletal:      Cervical back: Normal range of motion.   Neurological:      General: No focal deficit present.      Mental Status: " "She is alert and oriented to person, place, and time.   Psychiatric:         Mood and Affect: Mood normal.         Behavior: Behavior normal.         The history was obtained from the review of the chart, patient.    Lab Results:   Lab Results   Component Value Date/Time    Free t4 0.90 05/08/2024 07:29 AM       Imaging Studies:       I have personally reviewed pertinent reports.      Portions of the record may have been created with voice recognition software. Occasional wrong word or \"sound a like\" substitutions may have occurred due to the inherent limitations of voice recognition software. Read the chart carefully and recognize, using context, where substitutions have occurred.    "

## 2024-07-15 ENCOUNTER — ANNUAL EXAM (OUTPATIENT)
Dept: GYNECOLOGY | Facility: CLINIC | Age: 49
End: 2024-07-15
Payer: COMMERCIAL

## 2024-07-15 VITALS
WEIGHT: 194 LBS | SYSTOLIC BLOOD PRESSURE: 120 MMHG | HEIGHT: 63 IN | BODY MASS INDEX: 34.38 KG/M2 | DIASTOLIC BLOOD PRESSURE: 72 MMHG

## 2024-07-15 DIAGNOSIS — Z12.31 SCREENING MAMMOGRAM FOR BREAST CANCER: Primary | ICD-10-CM

## 2024-07-15 DIAGNOSIS — Z01.419 ENCOUNTER FOR GYNECOLOGICAL EXAMINATION WITHOUT ABNORMAL FINDING: ICD-10-CM

## 2024-07-15 PROCEDURE — S0612 ANNUAL GYNECOLOGICAL EXAMINA: HCPCS | Performed by: OBSTETRICS & GYNECOLOGY

## 2024-07-15 NOTE — PROGRESS NOTES
"Ambulatory Visit  Name: Carina Seymour      : 1975      MRN: [id number]  Encounter Provider: Anmol Briceño MD  Encounter Date: 7/15/2024   Encounter department: [facility] GYNECOLOGY [place]    Assessment & Plan     Normal breast and GYN exam  Normal Pap smear negative HPV 2023  Normal mammogram 2023  Normal colonoscopy 2023    Plan: Recommend healthy diet and exercise.    1. Screening mammogram for breast cancer  -     Mammo screening bilateral w 3d & cad; Future; Expected date: 2024      History of Present Illness G3, P2     Carina Seymour is a 48 y.o. female who presents annual exam with no complaints.  LMP was 2023.  No bleeding since.  Occasional hot flash less than a week.  No night sweats.  Denies any pelvic pain or dyspareunia.  Denies any breast bowel or bladder problems.  No change in family history.  Medications reviewed.  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  Medications reviewed    Objective     /72   Ht 5' 3\" (1.6 m)   Wt 88 kg (194 lb)   LMP 2023 (Approximate)   BMI 34.37 kg/m²     Physical Exam  Vitals and nursing note reviewed.   Constitutional:       General: She is not in acute distress.     Appearance: She is well-developed.   HENT:      Head: Normocephalic and atraumatic.   Eyes:      Conjunctiva/sclera: Conjunctivae normal.   Cardiovascular:      Rate and Rhythm: Normal rate and regular rhythm.      Heart sounds: No murmur heard.  Pulmonary:      Effort: Pulmonary effort is normal. No respiratory distress.      Breath sounds: Normal breath sounds.   Abdominal:      Palpations: Abdomen is soft.      Tenderness: There is no abdominal tenderness.   Genitourinary:     Vagina: Normal.      Cervix: Normal.      Uterus: Normal.       Adnexa: Right adnexa normal and left adnexa normal.      Rectum: Normal.      Comments: Normal external genitalia.  Normal urethra and Norphlet's glands.  No uterine prolapse " cystocele or rectocele.  Musculoskeletal:         General: No swelling.      Cervical back: Neck supple.   Skin:     General: Skin is warm and dry.      Capillary Refill: Capillary refill takes less than 2 seconds.   Neurological:      Mental Status: She is alert.   Psychiatric:         Mood and Affect: Mood normal.       Administrative Statements

## 2024-07-24 ENCOUNTER — RA CDI HCC (OUTPATIENT)
Dept: OTHER | Facility: HOSPITAL | Age: 49
End: 2024-07-24

## 2024-07-29 ENCOUNTER — HOSPITAL ENCOUNTER (OUTPATIENT)
Dept: RADIOLOGY | Facility: MEDICAL CENTER | Age: 49
Discharge: HOME | End: 2024-07-29
Payer: COMMERCIAL

## 2024-07-29 VITALS — HEIGHT: 63 IN | WEIGHT: 194 LBS | BODY MASS INDEX: 34.38 KG/M2

## 2024-07-29 DIAGNOSIS — Z12.31 ENCOUNTER FOR SCREENING MAMMOGRAM FOR MALIGNANT NEOPLASM OF BREAST: ICD-10-CM

## 2024-07-29 PROCEDURE — 77067 SCR MAMMO BI INCL CAD: CPT

## 2024-07-29 PROCEDURE — 77063 BREAST TOMOSYNTHESIS BI: CPT

## 2024-07-31 ENCOUNTER — OFFICE VISIT (OUTPATIENT)
Age: 49
End: 2024-07-31
Payer: COMMERCIAL

## 2024-07-31 VITALS
TEMPERATURE: 97.1 F | RESPIRATION RATE: 16 BRPM | BODY MASS INDEX: 34.34 KG/M2 | DIASTOLIC BLOOD PRESSURE: 90 MMHG | SYSTOLIC BLOOD PRESSURE: 132 MMHG | HEART RATE: 63 BPM | HEIGHT: 63 IN | OXYGEN SATURATION: 99 % | WEIGHT: 193.8 LBS

## 2024-07-31 DIAGNOSIS — I10 BENIGN ESSENTIAL HYPERTENSION: Primary | ICD-10-CM

## 2024-07-31 DIAGNOSIS — J30.1 SEASONAL ALLERGIC RHINITIS DUE TO POLLEN: ICD-10-CM

## 2024-07-31 DIAGNOSIS — E03.4 IDIOPATHIC ATROPHIC HYPOTHYROIDISM: ICD-10-CM

## 2024-07-31 DIAGNOSIS — J45.20 MILD INTERMITTENT ASTHMA WITHOUT COMPLICATION: ICD-10-CM

## 2024-07-31 DIAGNOSIS — E78.2 MIXED HYPERLIPIDEMIA: ICD-10-CM

## 2024-07-31 PROCEDURE — 99213 OFFICE O/P EST LOW 20 MIN: CPT

## 2024-07-31 NOTE — ASSESSMENT & PLAN NOTE
Under control.  TSH in therapeutic range.  Continue Synthroid brand name.  We will continue to monitor

## 2024-07-31 NOTE — PROGRESS NOTES
Assessment/Plan:         Problem List Items Addressed This Visit     Benign essential hypertension - Primary     Under control.    Continue valsartan with hydrochlorothiazide  We will re-evaluate at next office visit.           Idiopathic atrophic hypothyroidism     Under control.  TSH in therapeutic range.  Continue Synthroid brand name.  We will continue to monitor           Mixed hyperlipidemia     Under control.    With diet and exercise  Continue atorvastatin  We will re-evaluate at next office visit.           Mild intermittent asthma without complication     Under control.    Continue current medication.    We will re-evaluate at next office visit.           Seasonal allergic rhinitis due to pollen     Under control.    Continue current medication.    We will re-evaluate at next office visit.                Subjective:      Patient ID: Carina Seymour is a 48 y.o. female.    Patient here for review of chronic medical problems and  the labs and imaging if it is applicable.  Currently has no specific complaints other than mentioned in the review of systems  Denies chest pain, SOB, cough, abdominal pain, nausea, vomiting, fever, chills, lightheadedness, dizziness,headache, tingling or numbness.No bowel or bladder problem.          The following portions of the patient's history were reviewed and updated as appropriate:   Past Medical History:  She has a past medical history of Allergic  seasonal, Arthritis, Asthma, Disease of thyroid gland, Endometriosis, High cholesterol, Hypertension (7/15/2021), Hypothyroidism, Obesity, Sjogren's syndrome (HCC), and Stroke (HCC).,  _______________________________________________________________________  Medical Problems:  does not have any pertinent problems on file.,  _______________________________________________________________________  Past Surgical History:   has a past surgical history that includes  section; [place] tooth extraction; Hemorroidectomy; Mammo  (historical) (Bilateral, 06/02/2017); Colonoscopy; Cardiac Loop Recorder; Skin biopsy; and Tubal ligation (04/28/2010).,  _______________________________________________________________________  Family History:  family history includes Asthma in her mother; Bipolar disorder in her sister; Cancer in her maternal grandmother; Dementia in her father; Depression in her sister; Diabetes in her mother; Drug abuse in her sister; Heart disease in her father and mother; Hypertension in her father and mother; Mental illness in her sister; No Known Problems in her daughter, daughter, maternal aunt, paternal aunt, paternal grandfather, and paternal grandmother; Stroke in her maternal grandfather and maternal grandmother.,  _______________________________________________________________________  Social History:   reports that she has never smoked. She has been exposed to tobacco smoke. She has never used smokeless tobacco. She reports that she does not currently use alcohol. She reports that she does not use drugs.,  _______________________________________________________________________  Allergies:  is allergic to asa [aspirin] and avelox [moxifloxacin]..  _______________________________________________________________________  Current Outpatient Medications   Medication Sig Dispense Refill   • albuterol (PROVENTIL HFA,VENTOLIN HFA) 90 mcg/act inhaler albuterol sulfate HFA 90 mcg/actuation aerosol inhaler   INHALE 2 PUFFS EVERY 4 HOURS AS NEEDED. MAY USE 2 PUFFS 20-30 MINUTES BEFORE PHYSICAL EXERTION     • atorvastatin (LIPITOR) 40 mg tablet TAKE 1 TABLET BY MOUTH EVERY DAY IN THE EVENING 90 tablet 0   • clopidogrel (PLAVIX) 75 mg tablet TAKE 1 TABLET BY MOUTH EVERY DAY 90 tablet 1   • loratadine (CLARITIN) 10 mg tablet Take 10 mg by mouth daily     • Mometasone Furoate (Asmanex HFA) 100 MCG/ACT AERO Take 100 mcg by mouth 2 (two) times a day     • Synthroid 112 MCG tablet Take one tablet daily from Monday to Saturday and 1.5  "tablet on Sunday 100 tablet 3   • triamcinolone (KENALOG) 0.1 % cream APPLY TO ECZEMA ON BACK TWICE DAILY FOR 2WEEKS THEN AS NEEDED FOR FLARES.     • valsartan-hydrochlorothiazide (DIOVAN-HCT) 160-12.5 MG per tablet TAKE 1 TABLET BY MOUTH EVERY DAY 90 tablet 1     No current facility-administered medications for this visit.     _______________________________________________________________________  Review of Systems   Constitutional:  Negative for chills, fatigue and fever.   HENT:  Negative for congestion, ear pain, rhinorrhea, sneezing and sore throat.    Eyes:  Negative for redness, itching and visual disturbance.   Respiratory:  Negative for cough, chest tightness and shortness of breath.    Cardiovascular:  Negative for chest pain, palpitations and leg swelling.   Gastrointestinal:  Negative for abdominal pain, blood in stool, diarrhea, nausea and vomiting.   Endocrine: Negative for cold intolerance and heat intolerance.   Genitourinary:  Negative for dysuria, frequency and urgency.   Musculoskeletal:  Positive for arthralgias. Negative for back pain and myalgias.   Skin:  Negative for color change and rash.   Neurological:  Negative for dizziness, weakness, light-headedness, numbness and headaches.   Hematological:  Does not bruise/bleed easily.   Psychiatric/Behavioral:  Negative for agitation, behavioral problems and confusion.          Objective:  Vitals:    07/31/24 0934   BP: 132/90   BP Location: Left arm   Patient Position: Sitting   Cuff Size: Standard   Pulse: 63   Resp: 16   Temp: (!) 97.1 °F (36.2 °C)   TempSrc: Tympanic   SpO2: 99%   Weight: 87.9 kg (193 lb 12.8 oz)   Height: 5' 3\" (1.6 m)     Body mass index is 34.33 kg/m².     Physical Exam  Vitals reviewed.   Constitutional:       General: She is not in acute distress.     Appearance: Normal appearance. She is not ill-appearing, toxic-appearing or diaphoretic.   HENT:      Head: Normocephalic and atraumatic.      Nose: Nose normal. No " congestion.      Mouth/Throat:      Mouth: Mucous membranes are moist.   Eyes:      General: No scleral icterus.        Right eye: No discharge.         Left eye: No discharge.      Extraocular Movements: Extraocular movements intact.      Conjunctiva/sclera: Conjunctivae normal.      Pupils: Pupils are equal, round, and reactive to light.   Cardiovascular:      Rate and Rhythm: Normal rate and regular rhythm.      Pulses: Normal pulses.      Heart sounds: Normal heart sounds. No murmur heard.     No gallop.   Pulmonary:      Effort: Pulmonary effort is normal. No respiratory distress.      Breath sounds: Normal breath sounds. No wheezing, rhonchi or rales.   Abdominal:      General: Abdomen is flat. Bowel sounds are normal. There is no distension.      Palpations: Abdomen is soft.      Tenderness: There is no abdominal tenderness. There is no right CVA tenderness, left CVA tenderness or guarding.   Musculoskeletal:         General: No swelling or tenderness. Normal range of motion.      Cervical back: Normal range of motion and neck supple. No rigidity.      Right lower leg: No edema.      Left lower leg: No edema.   Lymphadenopathy:      Cervical: No cervical adenopathy.   Skin:     General: Skin is warm.      Capillary Refill: Capillary refill takes 2 to 3 seconds.      Coloration: Skin is not jaundiced or pale.      Findings: No bruising or rash.   Neurological:      General: No focal deficit present.      Mental Status: She is alert and oriented to person, place, and time. Mental status is at baseline.      Sensory: No sensory deficit.      Gait: Gait normal.   Psychiatric:         Mood and Affect: Mood normal.

## 2024-07-31 NOTE — ASSESSMENT & PLAN NOTE
Under control.    With diet and exercise  Continue atorvastatin  We will re-evaluate at next office visit.

## 2024-08-13 DIAGNOSIS — I63.9 STROKE (HCC): ICD-10-CM

## 2024-08-13 RX ORDER — ATORVASTATIN CALCIUM 40 MG/1
40 TABLET, FILM COATED ORAL EVERY EVENING
Qty: 90 TABLET | Refills: 0 | Status: SHIPPED | OUTPATIENT
Start: 2024-08-13

## 2024-10-22 ENCOUNTER — RA CDI HCC (OUTPATIENT)
Dept: OTHER | Facility: HOSPITAL | Age: 49
End: 2024-10-22

## 2024-10-31 ENCOUNTER — OFFICE VISIT (OUTPATIENT)
Age: 49
End: 2024-10-31
Payer: COMMERCIAL

## 2024-10-31 VITALS
DIASTOLIC BLOOD PRESSURE: 82 MMHG | OXYGEN SATURATION: 96 % | HEIGHT: 63 IN | HEART RATE: 65 BPM | TEMPERATURE: 97.7 F | BODY MASS INDEX: 33.7 KG/M2 | WEIGHT: 190.2 LBS | SYSTOLIC BLOOD PRESSURE: 130 MMHG | RESPIRATION RATE: 16 BRPM

## 2024-10-31 DIAGNOSIS — I10 BENIGN ESSENTIAL HYPERTENSION: ICD-10-CM

## 2024-10-31 DIAGNOSIS — Z23 ENCOUNTER FOR ADMINISTRATION OF VACCINE: Primary | ICD-10-CM

## 2024-10-31 DIAGNOSIS — I63.9 STROKE (HCC): ICD-10-CM

## 2024-10-31 DIAGNOSIS — E03.4 IDIOPATHIC ATROPHIC HYPOTHYROIDISM: ICD-10-CM

## 2024-10-31 DIAGNOSIS — J30.1 SEASONAL ALLERGIC RHINITIS DUE TO POLLEN: ICD-10-CM

## 2024-10-31 DIAGNOSIS — E78.2 MIXED HYPERLIPIDEMIA: ICD-10-CM

## 2024-10-31 DIAGNOSIS — E06.3 HYPOTHYROIDISM DUE TO HASHIMOTO'S THYROIDITIS: ICD-10-CM

## 2024-10-31 DIAGNOSIS — J45.20 MILD INTERMITTENT ASTHMA WITHOUT COMPLICATION: ICD-10-CM

## 2024-10-31 PROCEDURE — 90471 IMMUNIZATION ADMIN: CPT

## 2024-10-31 PROCEDURE — 90673 RIV3 VACCINE NO PRESERV IM: CPT

## 2024-10-31 PROCEDURE — 99213 OFFICE O/P EST LOW 20 MIN: CPT

## 2024-10-31 RX ORDER — CLOPIDOGREL BISULFATE 75 MG/1
75 TABLET ORAL DAILY
Qty: 90 TABLET | Refills: 1 | Status: SHIPPED | OUTPATIENT
Start: 2024-10-31

## 2024-10-31 RX ORDER — VALSARTAN AND HYDROCHLOROTHIAZIDE 160; 12.5 MG/1; MG/1
1 TABLET, FILM COATED ORAL DAILY
Qty: 90 TABLET | Refills: 1 | Status: SHIPPED | OUTPATIENT
Start: 2024-10-31

## 2024-10-31 RX ORDER — ATORVASTATIN CALCIUM 40 MG/1
40 TABLET, FILM COATED ORAL EVERY EVENING
Qty: 90 TABLET | Refills: 1 | Status: SHIPPED | OUTPATIENT
Start: 2024-10-31

## 2024-10-31 NOTE — PROGRESS NOTES
Ambulatory Visit  Name: Carina Seymour      : 1975      MRN: [id number]  Encounter Provider: Melissa Foster MD  Encounter Date: 10/31/2024   Encounter department: [facility]IMARY [facility]    Assessment & Plan  Encounter for administration of vaccine    Orders:    Flublok (recombinant) 0.5 mL IM    Benign essential hypertension    Orders:    valsartan-hydrochlorothiazide (DIOVAN-HCT) 160-12.5 MG per tablet; Take 1 tablet by mouth daily    Idiopathic atrophic hypothyroidism         Mild intermittent asthma without complication         Mixed hyperlipidemia         Seasonal allergic rhinitis due to pollen         Stroke (HCC)    Orders:    clopidogrel (PLAVIX) 75 mg tablet; Take 1 tablet (75 mg total) by mouth daily    atorvastatin (LIPITOR) 40 mg tablet; Take 1 tablet (40 mg total) by mouth every evening    Hypothyroidism due to Hashimoto's thyroiditis            History of Present Illness     Patient here for review of chronic medical problems and  the labs and imaging if it is applicable.  Currently has no specific complaints other than mentioned in the review of systems  Denies chest pain, SOB, cough, abdominal pain, nausea, vomiting, fever, chills, lightheadedness, dizziness,headache, tingling or numbness.No bowel or bladder problem.          History obtained from : patient  Review of Systems   Constitutional:  Negative for chills, fatigue and fever.   HENT:  Negative for congestion, ear pain, rhinorrhea, sneezing and sore throat.    Eyes:  Negative for redness, itching and visual disturbance.   Respiratory:  Negative for cough, chest tightness and shortness of breath.    Cardiovascular:  Negative for chest pain, palpitations and leg swelling.   Gastrointestinal:  Negative for abdominal pain, blood in stool, diarrhea, nausea and vomiting.   Endocrine: Negative for cold intolerance and heat intolerance.   Genitourinary:  Negative for dysuria, frequency and urgency.   Musculoskeletal:   Positive for arthralgias. Negative for back pain and myalgias.   Skin:  Negative for color change and rash.   Neurological:  Negative for dizziness, weakness, light-headedness, numbness and headaches.   Hematological:  Does not bruise/bleed easily.   Psychiatric/Behavioral:  Negative for agitation, behavioral problems and confusion.      Medical History Reviewed by provider this encounter:  Tobacco  Allergies  Meds  Problems  Med Hx  Surg Hx  Fam Hx       Past Medical History   Past Medical History:   Diagnosis Date    Allergic  seasonal     Arthritis     Asthma     Disease of thyroid gland     Endometriosis     High cholesterol     Hypertension 7/15/2021    Normally no but on 7/15 during an episode of slurred speech    Hypothyroidism     Obesity     Sjogren's syndrome (HCC)     Stroke (HCC)     Found out 7/15/2021 with an MRI     Past Surgical History:   Procedure Laterality Date    CARDIAC LOOP RECORDER       SECTION      x2    COLONOSCOPY      HEMORROIDECTOMY      MAMMO (HISTORICAL) Bilateral 2017    SKIN BIOPSY      TUBAL LIGATION  2010    WISDOM TOOTH EXTRACTION       Family History   Problem Relation Age of Onset    Heart disease Mother     Diabetes Mother     Hypertension Mother     Asthma Mother     Heart disease Father     Hypertension Father     Dementia Father     Depression Sister     Mental illness Sister         Bipolar manic depressant.    Bipolar disorder Sister     Drug abuse Sister     No Known Problems Daughter     No Known Problems Daughter     Cancer Maternal Grandmother     Stroke Maternal Grandmother     Stroke Maternal Grandfather     No Known Problems Paternal Grandmother     No Known Problems Paternal Grandfather     No Known Problems Maternal Aunt     No Known Problems Paternal Aunt      Current Outpatient Medications on File Prior to Visit   Medication Sig Dispense Refill    albuterol (PROVENTIL HFA,VENTOLIN HFA) 90 mcg/act inhaler albuterol sulfate HFA 90  mcg/actuation aerosol inhaler   INHALE 2 PUFFS EVERY 4 HOURS AS NEEDED. MAY USE 2 PUFFS 20-30 MINUTES BEFORE PHYSICAL EXERTION      loratadine (CLARITIN) 10 mg tablet Take 10 mg by mouth daily      Mometasone Furoate (Asmanex HFA) 100 MCG/ACT AERO Take 100 mcg by mouth 2 (two) times a day      Synthroid 112 MCG tablet Take one tablet daily from Monday to Saturday and 1.5 tablet on Sunday 100 tablet 3    triamcinolone (KENALOG) 0.1 % cream APPLY TO ECZEMA ON BACK TWICE DAILY FOR 2WEEKS THEN AS NEEDED FOR FLARES.      [DISCONTINUED] atorvastatin (LIPITOR) 40 mg tablet Take 1 tablet (40 mg total) by mouth every evening 90 tablet 0    [DISCONTINUED] clopidogrel (PLAVIX) 75 mg tablet TAKE 1 TABLET BY MOUTH EVERY DAY 90 tablet 1    [DISCONTINUED] valsartan-hydrochlorothiazide (DIOVAN-HCT) 160-12.5 MG per tablet TAKE 1 TABLET BY MOUTH EVERY DAY 90 tablet 1     No current facility-administered medications on file prior to visit.     Allergies   Allergen Reactions    Asa [Aspirin]     Avelox [Moxifloxacin]       Current Outpatient Medications on File Prior to Visit   Medication Sig Dispense Refill    albuterol (PROVENTIL HFA,VENTOLIN HFA) 90 mcg/act inhaler albuterol sulfate HFA 90 mcg/actuation aerosol inhaler   INHALE 2 PUFFS EVERY 4 HOURS AS NEEDED. MAY USE 2 PUFFS 20-30 MINUTES BEFORE PHYSICAL EXERTION      loratadine (CLARITIN) 10 mg tablet Take 10 mg by mouth daily      Mometasone Furoate (Asmanex HFA) 100 MCG/ACT AERO Take 100 mcg by mouth 2 (two) times a day      Synthroid 112 MCG tablet Take one tablet daily from Monday to Saturday and 1.5 tablet on Sunday 100 tablet 3    triamcinolone (KENALOG) 0.1 % cream APPLY TO ECZEMA ON BACK TWICE DAILY FOR 2WEEKS THEN AS NEEDED FOR FLARES.      [DISCONTINUED] atorvastatin (LIPITOR) 40 mg tablet Take 1 tablet (40 mg total) by mouth every evening 90 tablet 0    [DISCONTINUED] clopidogrel (PLAVIX) 75 mg tablet TAKE 1 TABLET BY MOUTH EVERY DAY 90 tablet 1    [DISCONTINUED]  "valsartan-hydrochlorothiazide (DIOVAN-HCT) 160-12.5 MG per tablet TAKE 1 TABLET BY MOUTH EVERY DAY 90 tablet 1     No current facility-administered medications on file prior to visit.      Social History     Tobacco Use    Smoking status: Never     Passive exposure: Past    Smokeless tobacco: Never   Vaping Use    Vaping status: Never Used   Substance and Sexual Activity    Alcohol use: Not Currently    Drug use: Never    Sexual activity: Yes     Partners: Male     Birth control/protection: None         Objective     /82 (BP Location: Left arm, Patient Position: Sitting, Cuff Size: Standard)   Pulse 65   Temp 97.7 °F (36.5 °C)   Resp 16   Ht 5' 3\" (1.6 m)   Wt 86.3 kg (190 lb 3.2 oz)   LMP 04/01/2023 (Approximate)   SpO2 96%   BMI 33.69 kg/m²     Physical Exam  Vitals and nursing note reviewed.   Constitutional:       General: She is not in acute distress.     Appearance: Normal appearance. She is well-developed. She is obese. She is not ill-appearing, toxic-appearing or diaphoretic.   HENT:      Head: Normocephalic and atraumatic.      Nose: Nose normal. No congestion or rhinorrhea.      Mouth/Throat:      Mouth: Mucous membranes are moist.      Pharynx: Oropharynx is clear. No posterior oropharyngeal erythema.   Eyes:      General: No scleral icterus.        Right eye: No discharge.         Left eye: No discharge.      Extraocular Movements: Extraocular movements intact.      Conjunctiva/sclera: Conjunctivae normal.      Pupils: Pupils are equal, round, and reactive to light.   Cardiovascular:      Rate and Rhythm: Normal rate and regular rhythm.      Pulses: Normal pulses.      Heart sounds: Normal heart sounds. No murmur heard.     No gallop.   Pulmonary:      Effort: Pulmonary effort is normal. No respiratory distress.      Breath sounds: Normal breath sounds. No wheezing or rales.   Abdominal:      General: Abdomen is flat. Bowel sounds are normal. There is no distension.      Palpations: Abdomen " is soft.      Tenderness: There is no abdominal tenderness. There is no right CVA tenderness, left CVA tenderness or guarding.   Musculoskeletal:         General: No swelling or tenderness. Normal range of motion.      Cervical back: Normal range of motion and neck supple. No rigidity.      Right lower leg: No edema.      Left lower leg: No edema.   Lymphadenopathy:      Cervical: No cervical adenopathy.   Skin:     General: Skin is warm and dry.      Capillary Refill: Capillary refill takes 2 to 3 seconds.      Coloration: Skin is not jaundiced or pale.   Neurological:      General: No focal deficit present.      Mental Status: She is alert and oriented to person, place, and time. Mental status is at baseline.      Sensory: No sensory deficit.      Motor: No weakness.      Gait: Gait normal.   Psychiatric:         Mood and Affect: Mood normal.         Behavior: Behavior normal.

## 2024-10-31 NOTE — ASSESSMENT & PLAN NOTE
Orders:    valsartan-hydrochlorothiazide (DIOVAN-HCT) 160-12.5 MG per tablet; Take 1 tablet by mouth daily

## 2024-11-11 ENCOUNTER — TELEPHONE (OUTPATIENT)
Age: 49
End: 2024-11-11

## 2024-11-11 ENCOUNTER — TELEMEDICINE (OUTPATIENT)
Age: 49
End: 2024-11-11
Payer: COMMERCIAL

## 2024-11-11 VITALS — WEIGHT: 190 LBS | TEMPERATURE: 101 F | BODY MASS INDEX: 33.66 KG/M2 | HEIGHT: 63 IN

## 2024-11-11 DIAGNOSIS — U07.1 COVID-19: Primary | ICD-10-CM

## 2024-11-11 PROCEDURE — 99422 OL DIG E/M SVC 11-20 MIN: CPT | Performed by: INTERNAL MEDICINE

## 2024-11-11 RX ORDER — NIRMATRELVIR AND RITONAVIR 150-100 MG
2 KIT ORAL 2 TIMES DAILY
Qty: 20 TABLET | Refills: 0 | Status: SHIPPED | OUTPATIENT
Start: 2024-11-11 | End: 2024-11-16

## 2024-11-11 NOTE — PROGRESS NOTES
COVID-19 Outpatient Progress Note  Name: Carina Seymour      : 1975      MRN: [id number]  Encounter Provider: Tammie Brooke MD  Encounter Date: 2024   Encounter department: [facility]IMARY [facility]      Disposition:     Discussed symptom directed medication options with patient. Discussed vitamin D, vitamin C, and/or zinc supplementation with patient.   Patient recommended to hold her Lipitor for the duration of Paxlovid therapy.  She verbalized understanding.    Patient meets criteria for Paxlovid and they have been counseled appropriately regarding risks, benefits, side effects, and alternative treatment options. After discussion, patient agrees to treatment.    Possible side effects of Paxlovid?    Possible side effects of Paxlovid are:  - Liver Problems. Notify us right away if you start to experience loss of appetite, yellowing of your skin and the whites of eyes (jaundice), dark-colored urine, pale colored stools and itchy skin, stomach area (abdominal) pain.  - Resistance to HIV Medicines. If you have untreated HIV infection, Paxlovid may lead to some HIV medicines not working as well in the future.  - Other possible side effects include: altered sense of taste, diarrhea, high blood pressure, or muscle aches.    I have spent a total time of 15 minutes on the day of the encounter for this patient including discussing diagnostic results, discussing prognosis, risks and benefits of treatment options, instructions for management, patient and family education, importance of treatment compliance, risk factor reductions, impressions, counseling/coordination of care, documenting in the medical record, reviewing/ordering tests, medicine, procedures, obtaining or reviewing history and communicating with other healthcare professionals.          Encounter provider: Tammie Brooke MD     Provider located at: [facility]ARE  [facility]  [address] [place] [address]  [place] [id number]  [phone]     Recent Visits  No visits were found meeting these conditions.  Showing recent visits within past 7 days and meeting all other requirements  Today's Visits  Date Type Provider Dept   11/11/24 Telemedicine MD [facility] [address] [facility]   Showing today's visits and meeting all other requirements  Future Appointments  No visits were found meeting these conditions.  Showing future appointments within next 150 days and meeting all other requirements    History of Present Illness      This virtual check-in was done via [employer] and patient was informed that this is a secure, HIPAA-compliant platform. She agrees to proceed.    Patient agrees to participate in a virtual check in via telephone or video visit instead of presenting to the office to address urgent/immediate medical needs. Patient is aware this is a billable service. She acknowledged consent and understanding of privacy and security of the video platform. The patient has agreed to participate and understands they can discontinue the visit at any time.    After connecting through [facility], the patient was identified by name and date of birth. Carina Seymour was informed that this was a telemedicine visit and that the exam was being conducted confidentially over secure lines. My office door was closed. No one else was in the room. Carina Seymour acknowledged consent and understanding of privacy and security of the telemedicine visit. I informed the patient that I have reviewed her record in Epic and presented the opportunity for her to ask any questions regarding the visit today. The patient agreed to participate.     Verification of patient location:  Patient is located in the following state in which I hold an active license: [place]    Subjective:   Carina Seymour is a 48 y.o. adult who is concerned about COVID-19. Patient's symptoms include fever, nasal congestion, rhinorrhea and cough.  Patient denies chills, fatigue, sore throat, shortness of breath, chest tightness, abdominal pain, nausea, vomiting, diarrhea, myalgias and headaches.     - Date of symptom onset: 11/9/2024      COVID-19 vaccination status: Fully vaccinated with booster    Exposure:   Contact with a person who is under investigation (PUI) for or who is positive for COVID-19 within the last 14 days?: No    Hospitalized recently for fever and/or lower respiratory symptoms?: No      Currently a healthcare worker that is involved in direct patient care?: No      Works in a special setting where the risk of COVID-19 transmission may be high? (this may include long-term care, correctional and retirement facilities; homeless shelters; assisted-living facilities and group homes.): No      Resident in a special setting where the risk of COVID-19 transmission may be high? (this may include long-term care, correctional and retirement facilities; homeless shelters; assisted-living facilities and group homes.): No        Lab Results   Component Value Date    SARSCOV2 Negative 07/16/2021    SARSCORONAVI Not Detected 10/30/2020       Review of Systems   Constitutional:  Positive for fever. Negative for activity change, appetite change, chills, diaphoresis, fatigue and unexpected weight change.   HENT:  Positive for congestion and rhinorrhea. Negative for drooling, ear discharge, ear pain, facial swelling, hearing loss, postnasal drip, sinus pressure, sinus pain, sneezing, sore throat, tinnitus, trouble swallowing and voice change.    Eyes:  Negative for discharge.   Respiratory:  Positive for cough. Negative for apnea, choking, chest tightness, shortness of breath, wheezing and stridor.    Cardiovascular:  Negative for chest pain, palpitations and leg swelling.   Gastrointestinal:  Negative for abdominal distention, abdominal pain, blood in stool, constipation, diarrhea, nausea, rectal pain and vomiting.   Genitourinary:  Negative for dysuria, flank  "pain, frequency and urgency.   Musculoskeletal:  Negative for arthralgias, back pain, gait problem, joint swelling and myalgias.   Skin:  Negative for color change, pallor and rash.   Neurological:  Negative for dizziness and headaches.     Objective     Temp (!) 101 °F (38.3 °C) (Tympanic)   Ht 5' 3\" (1.6 m)   Wt 86.2 kg (190 lb)   LMP 04/01/2023 (Approximate)   BMI 33.66 kg/m²       Physical Exam  Constitutional:       General: She is not in acute distress.     Appearance: Normal appearance. She is not ill-appearing, toxic-appearing or diaphoretic.   Neurological:      Mental Status: She is alert and oriented to person, place, and time.       "

## 2024-11-11 NOTE — TELEPHONE ENCOUNTER
Pt called in stating she developed cold symptoms on Saturday. Pt states she took an at home test that  since  of last year, and the test was positive. Pt states she has a 101.3 fever. Pt has a history of asthma. Pt states she is also coughing and is congested.    Pt would like to know how accurate the at home tests are and if there is any medication he PCP can prescribe to help with these symptoms.    Please advise.

## 2025-02-12 ENCOUNTER — RA CDI HCC (OUTPATIENT)
Dept: OTHER | Facility: HOSPITAL | Age: 50
End: 2025-02-12

## 2025-02-12 NOTE — PROGRESS NOTES
HCC coding opportunities       Chart reviewed, no opportunity found: CHART REVIEWED, NO OPPORTUNITY FOUND      This is a reminder to address (resolve/update/assess) ALL HCC (risk adjustment) codes as found on active problem list for 2025 as patient scores reset to zero CECILY.  Patients Insurance        Commercial Insurance: [employer]

## 2025-02-18 ENCOUNTER — OFFICE VISIT (OUTPATIENT)
Age: 50
End: 2025-02-18
Payer: COMMERCIAL

## 2025-02-18 VITALS
WEIGHT: 197.8 LBS | SYSTOLIC BLOOD PRESSURE: 110 MMHG | HEIGHT: 63 IN | HEART RATE: 74 BPM | TEMPERATURE: 97.7 F | DIASTOLIC BLOOD PRESSURE: 70 MMHG | BODY MASS INDEX: 35.05 KG/M2 | OXYGEN SATURATION: 99 % | RESPIRATION RATE: 18 BRPM

## 2025-02-18 DIAGNOSIS — M35.00 SJOGREN'S SYNDROME, WITH UNSPECIFIED ORGAN INVOLVEMENT (HCC): ICD-10-CM

## 2025-02-18 DIAGNOSIS — J45.20 MILD INTERMITTENT ASTHMA WITHOUT COMPLICATION: ICD-10-CM

## 2025-02-18 DIAGNOSIS — E78.2 MIXED HYPERLIPIDEMIA: ICD-10-CM

## 2025-02-18 DIAGNOSIS — I10 BENIGN ESSENTIAL HYPERTENSION: Primary | ICD-10-CM

## 2025-02-18 DIAGNOSIS — J30.1 SEASONAL ALLERGIC RHINITIS DUE TO POLLEN: ICD-10-CM

## 2025-02-18 DIAGNOSIS — E03.4 IDIOPATHIC ATROPHIC HYPOTHYROIDISM: ICD-10-CM

## 2025-02-18 PROCEDURE — 99214 OFFICE O/P EST MOD 30 MIN: CPT

## 2025-02-18 RX ORDER — ATORVASTATIN CALCIUM 20 MG/1
20 TABLET, FILM COATED ORAL DAILY
COMMUNITY

## 2025-02-18 NOTE — PROGRESS NOTES
Name: Carina Seymour      : 1975      MRN: [id number]  Encounter Provider: Melissa Foster MD  Encounter Date: 2025   Encounter department: [facility] PRIMARY CARE  :  Assessment & Plan  Benign essential hypertension  Under control.    Continue valsartan with hydrochlorothiazide  We will re-evaluate at next office visit.         Idiopathic atrophic hypothyroidism  Under control.  TSH in therapeutic range.  Continue same dose of Synthroid.  Will continue to monitor           Mixed hyperlipidemia  Under control.    Continue atorvastatin  We will re-evaluate at next office visit.         Mild intermittent asthma without complication  Under control.    Continue current medication.    We will re-evaluate at next office visit.         Seasonal allergic rhinitis due to pollen  Under control.    Continue current medication.    We will re-evaluate at next office visit.         Sjogren's syndrome, with unspecified organ involvement (HCC)  Under control.    Continue current medication.    We will re-evaluate at next office visit.  Patient has been followed by a rheumatologist              History of Present Illness   Patient here for review of chronic medical problems and  the labs and imaging if it is applicable.  Currently has no specific complaints other than mentioned in the review of systems  Denies chest pain, SOB, cough, abdominal pain, nausea, vomiting, fever, chills, lightheadedness, dizziness,headache, tingling or numbness.No bowel or bladder problem.        Review of Systems   Constitutional:  Negative for chills, fatigue and fever.   HENT:  Negative for congestion, ear pain, rhinorrhea, sneezing and sore throat.    Eyes:  Negative for redness, itching and visual disturbance.   Respiratory:  Negative for cough, chest tightness and shortness of breath.    Cardiovascular:  Negative for chest pain, palpitations and leg swelling.   Gastrointestinal:  Negative for abdominal pain, blood in  "stool, diarrhea, nausea and vomiting.   Endocrine: Negative for cold intolerance and heat intolerance.   Genitourinary:  Negative for dysuria, frequency and urgency.   Musculoskeletal:  Positive for arthralgias. Negative for back pain and myalgias.   Skin:  Negative for color change and rash.   Neurological:  Negative for dizziness, weakness, light-headedness, numbness and headaches.   Hematological:  Does not bruise/bleed easily.   Psychiatric/Behavioral:  Negative for agitation, behavioral problems and confusion.        Objective   /70 (BP Location: Right arm, Patient Position: Sitting, Cuff Size: Standard)   Pulse 74   Temp 97.7 °F (36.5 °C) (Temporal)   Resp 18   Ht 5' 2.72\" (1.593 m)   Wt 89.7 kg (197 lb 12.8 oz)   LMP 04/01/2023 (Approximate)   SpO2 99%   BMI 35.36 kg/m²      Physical Exam  Vitals and nursing note reviewed.   Constitutional:       General: She is not in acute distress.     Appearance: Normal appearance. She is well-developed. She is obese.   HENT:      Head: Normocephalic and atraumatic.      Right Ear: External ear normal.      Left Ear: External ear normal.      Nose: Nose normal. No congestion.      Mouth/Throat:      Mouth: Mucous membranes are moist.      Pharynx: Oropharynx is clear. No posterior oropharyngeal erythema.   Eyes:      Conjunctiva/sclera: Conjunctivae normal.   Cardiovascular:      Rate and Rhythm: Normal rate and regular rhythm.      Pulses: Normal pulses.      Heart sounds: Normal heart sounds. No murmur heard.  Pulmonary:      Effort: Pulmonary effort is normal. No respiratory distress.      Breath sounds: Normal breath sounds. No wheezing.   Abdominal:      General: Abdomen is flat. Bowel sounds are normal.      Palpations: Abdomen is soft.      Tenderness: There is no abdominal tenderness.   Musculoskeletal:         General: No tenderness. Normal range of motion.      Cervical back: Normal range of motion and neck supple. No rigidity.   Lymphadenopathy: "      Cervical: No cervical adenopathy.   Skin:     General: Skin is warm and dry.      Capillary Refill: Capillary refill takes 2 to 3 seconds.   Neurological:      General: No focal deficit present.      Mental Status: She is alert and oriented to person, place, and time. Mental status is at baseline.      Gait: Gait normal.   Psychiatric:         Mood and Affect: Mood normal.         Behavior: Behavior normal.

## 2025-02-18 NOTE — ASSESSMENT & PLAN NOTE
Under control.    Continue valsartan with hydrochlorothiazide  We will re-evaluate at next office visit.

## 2025-02-18 NOTE — ASSESSMENT & PLAN NOTE
Under control.  TSH in therapeutic range.  Continue same dose of Synthroid.  Will continue to monitor

## 2025-05-01 DIAGNOSIS — I63.9 STROKE (HCC): ICD-10-CM

## 2025-05-01 RX ORDER — CLOPIDOGREL BISULFATE 75 MG/1
75 TABLET ORAL DAILY
Qty: 30 TABLET | Refills: 0 | Status: SHIPPED | OUTPATIENT
Start: 2025-05-01

## 2025-05-05 NOTE — TELEPHONE ENCOUNTER
Patient called the RX Refill Line. Message is being forwarded to the office.     Patient is wondering why she only received 30 day supply of clopidogrel. I informed patient that she is due for blood work. Please place orders. Patient said no one called her to let her know she needed to get blood work done.

## 2025-05-27 DIAGNOSIS — I63.9 STROKE (HCC): ICD-10-CM

## 2025-05-27 DIAGNOSIS — I10 BENIGN ESSENTIAL HYPERTENSION: ICD-10-CM

## 2025-05-27 RX ORDER — CLOPIDOGREL BISULFATE 75 MG/1
75 TABLET ORAL DAILY
Qty: 90 TABLET | Refills: 0 | Status: SHIPPED | OUTPATIENT
Start: 2025-05-27

## 2025-05-27 RX ORDER — VALSARTAN AND HYDROCHLOROTHIAZIDE 160; 12.5 MG/1; MG/1
1 TABLET, FILM COATED ORAL DAILY
Qty: 90 TABLET | Refills: 1 | Status: SHIPPED | OUTPATIENT
Start: 2025-05-27

## 2025-05-27 NOTE — TELEPHONE ENCOUNTER
Patient needs a refill on Valsartan and clopidrogrel. Please advice, she is request a 90 day supply.

## 2025-07-03 DIAGNOSIS — E06.3 HYPOTHYROIDISM DUE TO HASHIMOTO'S THYROIDITIS: ICD-10-CM

## 2025-07-07 ENCOUNTER — TELEPHONE (OUTPATIENT)
Age: 50
End: 2025-07-07

## 2025-07-07 DIAGNOSIS — E06.3 HYPOTHYROIDISM DUE TO HASHIMOTO'S THYROIDITIS: ICD-10-CM

## 2025-07-07 RX ORDER — LEVOTHYROXINE SODIUM 112 MCG
TABLET ORAL
Qty: 100 TABLET | Refills: 3 | OUTPATIENT
Start: 2025-07-07

## 2025-07-07 NOTE — TELEPHONE ENCOUNTER
- Patient was told that PCP would need to take over this script    Reason for call:   [x] Refill   [] Prior Auth  [] Other:     Office:   [x] PCP/Provider -   [] Specialty/Provider -     Medication:   - Synthroid 112 mcg- take 1 tablet daily from Monday to Saturday and 1.5 tablets on sunday      Pharmacy: [facility]   Does the patient have enough for 3 days?   [x] Yes   [] No - Send as HP to POD    Mail Away Pharmacy   Does the patient have enough for 10 days?   [] Yes   [] No - Send as HP to POD

## 2025-07-07 NOTE — TELEPHONE ENCOUNTER
Patient called the refill line wanting to know what lab orders the doctor was planning for her to get prior to her appointment. Informed her that I do no see any current lab orders. Patient is requesting a message to be sent to the doctor to see if any labs are going to be ordered so she can find out how much its going to cost. Please review and contact patient

## 2025-07-08 DIAGNOSIS — E03.4 IDIOPATHIC ATROPHIC HYPOTHYROIDISM: Primary | ICD-10-CM

## 2025-07-09 LAB — TSH SERPL-ACNC: 0.51 UIU/ML (ref 0.45–5.33)

## 2025-07-09 NOTE — TELEPHONE ENCOUNTER
Pt called in requesting a call back from Clinical regarding this lab testing. Pt is concerned with the coding on the script due to insurance purposes. Pt would like to know if the coding on the script is for preventative measures or not and based off of that answer, she will know if her insurance company will cover the completed lab work or not.    Please advise.

## 2025-07-09 NOTE — TELEPHONE ENCOUNTER
Unsure what her insurance would cover, I reach out to the patient no answer left a voicemail stating the code we used on the blood work she let the insurance company know if its covered or not.

## 2025-07-10 RX ORDER — LEVOTHYROXINE SODIUM 112 MCG
TABLET ORAL
Qty: 100 TABLET | Refills: 0 | Status: SHIPPED | OUTPATIENT
Start: 2025-07-10

## 2025-07-14 ENCOUNTER — OFFICE VISIT (OUTPATIENT)
Age: 50
End: 2025-07-14
Payer: COMMERCIAL

## 2025-07-14 VITALS
OXYGEN SATURATION: 98 % | RESPIRATION RATE: 18 BRPM | WEIGHT: 202 LBS | SYSTOLIC BLOOD PRESSURE: 120 MMHG | BODY MASS INDEX: 35.79 KG/M2 | TEMPERATURE: 97.7 F | DIASTOLIC BLOOD PRESSURE: 80 MMHG | HEIGHT: 63 IN | HEART RATE: 71 BPM

## 2025-07-14 DIAGNOSIS — E03.4 IDIOPATHIC ATROPHIC HYPOTHYROIDISM: ICD-10-CM

## 2025-07-14 DIAGNOSIS — E78.2 MIXED HYPERLIPIDEMIA: ICD-10-CM

## 2025-07-14 DIAGNOSIS — J45.20 MILD INTERMITTENT ASTHMA WITHOUT COMPLICATION: ICD-10-CM

## 2025-07-14 DIAGNOSIS — I10 BENIGN ESSENTIAL HYPERTENSION: Primary | ICD-10-CM

## 2025-07-14 PROCEDURE — 99214 OFFICE O/P EST MOD 30 MIN: CPT

## 2025-07-14 NOTE — PROGRESS NOTES
Name: Carina Seymour      : 1975      MRN: [id number]  Encounter Provider: Melissa Foster MD  Encounter Date: 2025   Encounter department: [facility]  :  Assessment & Plan  Benign essential hypertension  Under control.    Continue valsartan with hydrochlorothiazide  We will re-evaluate at next office visit.         Mild intermittent asthma without complication  Under control.    Continue current medication.    We will re-evaluate at next office visit.         Mixed hyperlipidemia  Under control.    Continue atorvastatin  We will re-evaluate at next office visit.         Idiopathic atrophic hypothyroidism  Under control.  TSH in therapeutic range.  Continue same dose of Synthroid.  Will continue to monitor                  History of Present Illness   Patient here for review of chronic medical problems and  the labs and imaging if it is applicable.  Currently has no specific complaints other than mentioned in the review of systems  Denies chest pain, SOB, cough, abdominal pain, nausea, vomiting, fever, chills, lightheadedness, dizziness,headache, tingling or numbness.No bowel or bladder problem.        Review of Systems   Constitutional:  Negative for chills, fatigue and fever.   HENT:  Negative for congestion, ear pain, rhinorrhea, sneezing and sore throat.    Eyes:  Negative for redness, itching and visual disturbance.   Respiratory:  Negative for cough, chest tightness and shortness of breath.    Cardiovascular:  Negative for chest pain, palpitations and leg swelling.   Gastrointestinal:  Negative for abdominal pain, blood in stool, diarrhea, nausea and vomiting.   Endocrine: Negative for cold intolerance and heat intolerance.   Genitourinary:  Negative for dysuria, frequency and urgency.   Musculoskeletal:  Positive for arthralgias. Negative for back pain and myalgias.   Skin:  Negative for color change and rash.   Neurological:  Negative for dizziness, weakness,  "light-headedness, numbness and headaches.   Hematological:  Does not bruise/bleed easily.   Psychiatric/Behavioral:  Negative for agitation, behavioral problems and confusion.        Objective   /80 (BP Location: Left arm, Patient Position: Sitting, Cuff Size: Large)   Pulse 71   Temp 97.7 °F (36.5 °C) (Tympanic)   Resp 18   Ht 5' 2.72\" (1.593 m)   Wt 91.6 kg (202 lb)   LMP 04/01/2023 (Approximate)   SpO2 98%   BMI 36.10 kg/m²      Physical Exam  Vitals and nursing note reviewed.   Constitutional:       General: She is not in acute distress.     Appearance: Normal appearance. She is well-developed. She is obese. She is not ill-appearing, toxic-appearing or diaphoretic.   HENT:      Head: Normocephalic and atraumatic.      Right Ear: External ear normal.      Left Ear: External ear normal.      Nose: Nose normal. No congestion or rhinorrhea.      Mouth/Throat:      Mouth: Mucous membranes are moist.      Pharynx: Oropharynx is clear. No posterior oropharyngeal erythema.     Eyes:      General: No scleral icterus.        Right eye: No discharge.         Left eye: No discharge.      Extraocular Movements: Extraocular movements intact.      Conjunctiva/sclera: Conjunctivae normal.      Pupils: Pupils are equal, round, and reactive to light.       Cardiovascular:      Rate and Rhythm: Normal rate and regular rhythm.      Pulses: Normal pulses.      Heart sounds: Normal heart sounds. No murmur heard.     No gallop.   Pulmonary:      Effort: Pulmonary effort is normal. No respiratory distress.      Breath sounds: Normal breath sounds. No wheezing or rales.   Abdominal:      General: Abdomen is flat. Bowel sounds are normal. There is no distension.      Palpations: Abdomen is soft.      Tenderness: There is no abdominal tenderness. There is no right CVA tenderness, left CVA tenderness or guarding.     Musculoskeletal:         General: No swelling or tenderness. Normal range of motion.      Cervical back: " Normal range of motion and neck supple. No rigidity.      Right lower leg: No edema.      Left lower leg: No edema.   Lymphadenopathy:      Cervical: No cervical adenopathy.     Skin:     General: Skin is warm and dry.      Capillary Refill: Capillary refill takes 2 to 3 seconds.      Coloration: Skin is not jaundiced or pale.     Neurological:      General: No focal deficit present.      Mental Status: She is alert and oriented to person, place, and time. Mental status is at baseline.      Sensory: No sensory deficit.      Motor: No weakness.      Gait: Gait normal.     Psychiatric:         Mood and Affect: Mood normal.         Behavior: Behavior normal.

## 2025-07-14 NOTE — PROGRESS NOTES
"Adult Annual Physical  Name: Carina Seymour      : 1975      MRN: [id number]  Encounter Provider: Melissa Foster MD  Encounter Date: 2025   Encounter department: [facility]    :  Assessment & Plan        Preventive Screenings:    - Cervical cancer screening: screening up-to-date   - Breast cancer screening: screening up-to-date     Immunizations:  - Immunizations due: Tdap and Zoster (Shingrix)         History of Present Illness   {?Quick Links Encounters * My Last Note * Last Note in Specialty * Snapshot * Since Last Visit * History :79921}  Adult Annual Physical:  Patient presents for annual physical.     Diet and Physical Activity:  - Diet/Nutrition: well balanced diet.  - Exercise: walking, 3-4 times a week on average and less than 30 minutes on average.    Depression Screening:  - PHQ-2 Score: 0    General Health:  - Sleep: sleeps well and 7-8 hours of sleep on average.  - Hearing: normal hearing bilateral ears.  - Vision: wears glasses, no vision problems and most recent eye exam > 1 year ago.  - Dental: regular dental visits, brushes teeth twice daily and floss regularly.    /GYN Health:  - Follows with GYN: yes.   - History of STDs: no    Advanced Care Planning:  - Has an advanced directive?: no    - Has a durable medical POA?: no    - ACP document given to patient?: no      Review of Systems  {Select to Display PMH (Optional):22701}    Objective {?Quick Links Trend Vitals * Enter New Vitals * Results Review * Timeline (Adult) * Labs * Imaging * Cardiology * Procedures * Lung Cancer Screening * Surgical eConsent :97636}  /80 (BP Location: Left arm, Patient Position: Sitting, Cuff Size: Large)   Pulse 71   Temp 97.7 °F (36.5 °C) (Tympanic)   Resp 18   Ht 5' 2.72\" (1.593 m)   Wt 91.6 kg (202 lb)   LMP 2023 (Approximate)   SpO2 98%   BMI 36.10 kg/m²     Physical Exam  {Administrative / Billing Section (Optional):63604}  "

## 2025-08-07 ENCOUNTER — ANNUAL EXAM (OUTPATIENT)
Age: 50
End: 2025-08-07

## 2025-08-07 VITALS
SYSTOLIC BLOOD PRESSURE: 123 MMHG | WEIGHT: 201 LBS | HEIGHT: 63 IN | HEART RATE: 70 BPM | DIASTOLIC BLOOD PRESSURE: 80 MMHG | BODY MASS INDEX: 35.61 KG/M2

## 2025-08-07 DIAGNOSIS — Z01.419 ENCOUNTER FOR ANNUAL ROUTINE GYNECOLOGICAL EXAMINATION: Primary | ICD-10-CM

## 2025-08-07 PROCEDURE — 99386 PREV VISIT NEW AGE 40-64: CPT | Performed by: OBSTETRICS & GYNECOLOGY

## 2025-08-20 ENCOUNTER — HOSPITAL ENCOUNTER (OUTPATIENT)
Dept: RADIOLOGY | Facility: MEDICAL CENTER | Age: 50
Discharge: HOME | End: 2025-08-20
Attending: OBSTETRICS & GYNECOLOGY
Payer: COMMERCIAL

## 2025-08-20 VITALS — HEIGHT: 63 IN | BODY MASS INDEX: 35.61 KG/M2 | WEIGHT: 201 LBS

## 2025-08-20 DIAGNOSIS — Z12.31 ENCOUNTER FOR SCREENING MAMMOGRAM FOR MALIGNANT NEOPLASM OF BREAST: ICD-10-CM

## 2025-08-20 PROCEDURE — 77067 SCR MAMMO BI INCL CAD: CPT

## 2025-08-20 PROCEDURE — 77063 BREAST TOMOSYNTHESIS BI: CPT
